# Patient Record
Sex: FEMALE | Race: WHITE | NOT HISPANIC OR LATINO | Employment: PART TIME | URBAN - METROPOLITAN AREA
[De-identification: names, ages, dates, MRNs, and addresses within clinical notes are randomized per-mention and may not be internally consistent; named-entity substitution may affect disease eponyms.]

---

## 2019-02-13 LAB
EXTERNAL CHLAMYDIA RESULT: NOT DETECTED
N GONORRHOEA RRNA SPEC QL PROBE: NOT DETECTED

## 2020-02-25 ENCOUNTER — OFFICE VISIT (OUTPATIENT)
Dept: FAMILY MEDICINE CLINIC | Facility: HOSPITAL | Age: 35
End: 2020-02-25
Payer: COMMERCIAL

## 2020-02-25 VITALS
OXYGEN SATURATION: 98 % | WEIGHT: 293 LBS | DIASTOLIC BLOOD PRESSURE: 100 MMHG | BODY MASS INDEX: 48.82 KG/M2 | HEIGHT: 65 IN | HEART RATE: 85 BPM | SYSTOLIC BLOOD PRESSURE: 160 MMHG | TEMPERATURE: 98.6 F

## 2020-02-25 DIAGNOSIS — E03.9 HYPOTHYROIDISM, UNSPECIFIED TYPE: Primary | ICD-10-CM

## 2020-02-25 DIAGNOSIS — Z13.220 SCREENING CHOLESTEROL LEVEL: ICD-10-CM

## 2020-02-25 DIAGNOSIS — E66.01 MORBID OBESITY WITH BMI OF 50.0-59.9, ADULT (HCC): ICD-10-CM

## 2020-02-25 DIAGNOSIS — R03.0 ELEVATED BLOOD PRESSURE READING: ICD-10-CM

## 2020-02-25 DIAGNOSIS — F41.1 GENERALIZED ANXIETY DISORDER: ICD-10-CM

## 2020-02-25 PROBLEM — O24.414 INSULIN CONTROLLED GESTATIONAL DIABETES MELLITUS (GDM) DURING PREGNANCY, ANTEPARTUM: Status: ACTIVE | Noted: 2019-08-13

## 2020-02-25 PROCEDURE — 1036F TOBACCO NON-USER: CPT | Performed by: NURSE PRACTITIONER

## 2020-02-25 PROCEDURE — 99204 OFFICE O/P NEW MOD 45 MIN: CPT | Performed by: NURSE PRACTITIONER

## 2020-02-25 RX ORDER — FLUTICASONE PROPIONATE 50 MCG
1 SPRAY, SUSPENSION (ML) NASAL DAILY
COMMUNITY

## 2020-02-25 RX ORDER — VENLAFAXINE HYDROCHLORIDE 37.5 MG/1
37.5 CAPSULE, EXTENDED RELEASE ORAL DAILY
Qty: 7 CAPSULE | Refills: 0 | Status: SHIPPED | OUTPATIENT
Start: 2020-02-25 | End: 2020-04-06 | Stop reason: ALTCHOICE

## 2020-02-25 RX ORDER — LEVOTHYROXINE SODIUM 0.05 MG/1
50 TABLET ORAL DAILY
COMMUNITY
End: 2020-03-10 | Stop reason: SDUPTHER

## 2020-02-25 RX ORDER — VENLAFAXINE HYDROCHLORIDE 75 MG/1
CAPSULE, EXTENDED RELEASE ORAL
Qty: 30 CAPSULE | Refills: 0 | Status: SHIPPED | OUTPATIENT
Start: 2020-02-25 | End: 2020-04-03

## 2020-02-25 NOTE — PATIENT INSTRUCTIONS
Obesity   AMBULATORY CARE:   Obesity  is when your body mass index (BMI) is greater than 30  Your healthcare provider will use your height and weight to measure your BMI  The risks of obesity include  many health problems, such as injuries or physical disability  You may need tests to check for the following:  · Diabetes     · High blood pressure or high cholesterol     · Heart disease     · Gallbladder or liver disease     · Cancer of the colon, breast, prostate, liver, or kidney     · Sleep apnea     · Arthritis or gout  Seek care immediately if:   · You have a severe headache, confusion, or difficulty speaking  · You have weakness on one side of your body  · You have chest pain, sweating, or shortness of breath  Contact your healthcare provider if:   · You have symptoms of gallbladder or liver disease, such as pain in your upper abdomen  · You have knee or hip pain and discomfort while walking  · You have symptoms of diabetes, such as intense hunger and thirst, and frequent urination  · You have symptoms of sleep apnea, such as snoring or daytime sleepiness  · You have questions or concerns about your condition or care  Treatment for obesity  focuses on helping you lose weight to improve your health  Even a small decrease in BMI can reduce the risk for many health problems  Your healthcare provider will help you set a weight-loss goal   · Lifestyle changes  are the first step in treating obesity  These include making healthy food choices and getting regular physical activity  Your healthcare provider may suggest a weight-loss program that involves coaching, education, and therapy  · Medicine  may help you lose weight when it is used with a healthy diet and physical activity  · Surgery  can help you lose weight if you are very obese and have other health problems  There are several types of weight-loss surgery  Ask your healthcare provider for more information    Be successful losing weight:   · Set small, realistic goals  An example of a small goal is to walk for 20 minutes 5 days a week  Anther goal is to lose 5% of your body weight  · Tell friends, family members, and coworkers about your goals  and ask for their support  Ask a friend to lose weight with you, or join a weight-loss support group  · Identify foods or triggers that may cause you to overeat , and find ways to avoid them  Remove tempting high-calorie foods from your home and workplace  Place a bowl of fresh fruit on your kitchen counter  If stress causes you to eat, then find other ways to cope with stress  · Keep a diary to track what you eat and drink  Also write down how many minutes of physical activity you do each day  Weigh yourself once a week and record it in your diary  Eating changes: You will need to eat 500 to 1,000 fewer calories each day than you currently eat to lose 1 to 2 pounds a week  The following changes will help you cut calories:  · Eat smaller portions  Use small plates, no larger than 9 inches in diameter  Fill your plate half full of fruits and vegetables  Measure your food using measuring cups until you know what a serving size looks like  · Eat 3 meals and 1 or 2 snacks each day  Plan your meals in advance  Alexsandra Xie and eat at home most of the time  Eat slowly  · Eat fruits and vegetables at every meal   They are low in calories and high in fiber, which makes you feel full  Do not add butter, margarine, or cream sauce to vegetables  Use herbs to season steamed vegetables  · Eat less fat and fewer fried foods  Eat more baked or grilled chicken and fish  These protein sources are lower in calories and fat than red meat  Limit fast food  Dress your salads with olive oil and vinegar instead of bottled dressing  · Limit the amount of sugar you eat  Do not drink sugary beverages  Limit alcohol  Activity changes:  Physical activity is good for your body in many ways   It helps you burn calories and build strong muscles  It decreases stress and depression, and improves your mood  It can also help you sleep better  Talk to your healthcare provider before you begin an exercise program   · Exercise for at least 30 minutes 5 days a week  Start slowly  Set aside time each day for physical activity that you enjoy and that is convenient for you  It is best to do both weight training and an activity that increases your heart rate, such as walking, bicycling, or swimming  · Find ways to be more active  Do yard work and housecleaning  Walk up the stairs instead of using elevators  Spend your leisure time going to events that require walking, such as outdoor festivals or fairs  This extra physical activity can help you lose weight and keep it off  Follow up with your healthcare provider as directed: You may need to meet with a dietitian  Write down your questions so you remember to ask them during your visits  © 2017 [address] Information is for End User's use only and may not be sold, redistributed or otherwise used for commercial purposes  All illustrations and images included in CareNotes® are the copyrighted property of [employer]  or Juan Siegel  The above information is an  only  It is not intended as medical advice for individual conditions or treatments  Talk to your doctor, nurse or pharmacist before following any medical regimen to see if it is safe and effective for you  Anxiety, Ambulatory Care   GENERAL INFORMATION:   Anxiety  is a condition that causes you to feel excessive worry, uneasiness, or fear  Family or work stress, smoking, caffeine, and alcohol can increase your risk for anxiety  Certain medicines or health conditions can also increase your risk  Anxiety may begin gradually and can become a long-term condition if it is not managed or treated    Common symptoms include the following:   · Fatigue or muscle tightness     · Shaking, restlessness, or irritability     · Problems focusing     · Trouble sleeping     · Feeling jumpy, easily startled, or dizzy     · Rapid heartbeat or shortness of breath  Seek immediate care for the following symptoms:   · Chest pain, tightness, or heaviness that may spread to your shoulders, arms, jaw, neck, or back    · Feeling like hurting yourself or someone else    · Dizziness or feeling lightheaded or faint  Treatment for anxiety  may include medicines to help you feel calm and relaxed, and decrease your symptoms  Healthcare providers will treat any medical conditions that may be causing your symptoms  Manage anxiety:   · Go to counseling as directed  Cognitive behavioral therapy can help you understand and change how you react to events that trigger your symptoms  · Find ways to manage your symptoms  Activities such as exercise, meditation, or listening to music can help you relax  · Practice deep breathing  Breathing can change how your body reacts to stress  Focus on taking slow, deep breaths several times a day, or during an anxiety attack  Breathe in through your nose, and out through your mouth  · Avoid caffeine  Caffeine can make your symptoms worse  Avoid foods or drinks that are meant to increase your energy level  · Limit or avoid alcohol  Ask your healthcare provider if alcohol is safe for you  You may not be able to drink alcohol if you take certain anxiety or depression medicines  Limit alcohol to 1 drink per day if you are a woman  Limit alcohol to 2 drinks per day if you are a man  A drink of alcohol is 12 ounces of beer, 5 ounces of wine, or 1½ ounces of liquor  Follow up with your healthcare provider as directed:  Write down your questions so you remember to ask them during your visits  CARE AGREEMENT:   You have the right to help plan your care  Learn about your health condition and how it may be treated   Discuss treatment options with your caregivers to decide what care you want to receive  You always have the right to refuse treatment  The above information is an  only  It is not intended as medical advice for individual conditions or treatments  Talk to your doctor, nurse or pharmacist before following any medical regimen to see if it is safe and effective for you  © 2014 [address] is for End User's use only and may not be sold, redistributed or otherwise used for commercial purposes  All illustrations and images included in CareNotes® are the copyrighted property of [employer]  or [facility]  Hypertension   AMBULATORY CARE:   Hypertension  is high blood pressure (BP)  Your BP is the force of your blood moving against the walls of your arteries  Normal BP is less than 120/80  Prehypertension is between 120/80 and 139/89  Hypertension is 140/90 or higher  Hypertension causes your BP to get so high that your heart has to work much harder than normal  This can damage your heart  You can control hypertension with a healthy lifestyle or medicines  A controlled blood pressure helps protect your organs, such as your heart, lungs, brain, and kidneys  Common symptoms include the following:   · Headache     · Blurred vision     · Chest pain     · Dizziness or weakness     · Trouble breathing    · Nosebleeds  Call 911 for any of the following:   · You have discomfort in your chest that feels like squeezing, pressure, fullness, or pain  · You become confused or have difficulty speaking  · You suddenly feel lightheaded or have trouble breathing  · You have pain or discomfort in your back, neck, jaw, stomach, or arm  Seek care immediately if:   · You have a severe headache or vision loss  · You have weakness in an arm or leg  Contact your healthcare provider if:   · You feel faint, dizzy, confused, or drowsy      · You have been taking your BP medicine and your BP is still higher than your healthcare provider says it should be  · You have questions or concerns about your condition or care  Treatment for hypertension  may include medicine to lower your BP and lower your cholesterol level  A low cholesterol level helps prevent heart disease and makes it easier to control your blood pressure  You may also need to make lifestyle changes  Take your medicine exactly as directed  Manage hypertension:  Talk with your healthcare provider about these and other ways to manage hypertension:  · Check your BP at home  Sit and rest for 5 minutes before you take your BP  Extend your arm and support it on a flat surface  Your arm should be at the same level as your heart  Follow the directions that came with your BP monitor  If possible, take at least 2 BP readings each time  Take your BP at least twice a day at the same times each day, such as morning and evening  Keep a record of your BP readings and bring it to your follow-up visits  Ask your healthcare provider what your BP should be  · Limit sodium (salt) as directed  Too much sodium can affect your fluid balance  Check labels to find low-sodium or no-salt-added foods  Some low-sodium foods use potassium salts for flavor  Too much potassium can also cause health problems  Your healthcare provider will tell you how much sodium and potassium are safe for you to have in a day  He or she may recommend that you limit sodium to 2,300 mg a day  · Follow the meal plan recommended by your healthcare provider  A dietitian or your provider can give you more information on low-sodium plans or the DASH (Dietary Approaches to Stop Hypertension) eating plan  The DASH plan is low in sodium, unhealthy fats, and total fat  It is high in potassium, calcium, and fiber  · Exercise to maintain a healthy weight  Exercise at least 30 minutes per day, on most days of the week  This will help decrease your blood pressure   Ask your healthcare provider about the best exercise plan for you  · Decrease stress  This may help lower your BP  Learn ways to relax, such as deep breathing or listening to music  · Limit alcohol  Women should limit alcohol to 1 drink a day  Men should limit alcohol to 2 drinks a day  A drink of alcohol is 12 ounces of beer, 5 ounces of wine, or 1½ ounces of liquor  · Do not smoke  Nicotine and other chemicals in cigarettes and cigars can increase your BP and also cause lung damage  Ask your healthcare provider for information if you currently smoke and need help to quit  E-cigarettes or smokeless tobacco still contain nicotine  Talk to your healthcare provider before you use these products  · Manage any other health conditions you have  Health conditions such as diabetes can increase your risk for hypertension  Follow your healthcare provider's instructions and take all your medicines as directed  Follow up with your healthcare provider as directed: You will need to return to have your BP checked and to have other lab tests done  Write down your questions so you remember to ask them during your visits  © 2017 [address] [facility] Information is for End User's use only and may not be sold, redistributed or otherwise used for commercial purposes  All illustrations and images included in CareNotes® are the copyrighted property of [employer] M [employer] Inc  or [employer]  The above information is an  only  It is not intended as medical advice for individual conditions or treatments  Talk to your doctor, nurse or pharmacist before following any medical regimen to see if it is safe and effective for you  DASH Eating Plan   WHAT YOU NEED TO KNOW:   The DASH (Dietary Approaches to Stop Hypertension) Eating Plan is designed to help prevent or lower high blood pressure  It can also help to lower LDL (bad) cholesterol and decrease your risk of heart disease   The plan is low in sodium, sugar, unhealthy fats, and total fat  It is high in potassium, calcium, magnesium, and fiber  These nutrients are added when you eat more fruits, vegetables, and whole grains  DISCHARGE INSTRUCTIONS:   Your sodium limit each day: Your dietitian will tell you how much sodium is safe for you to have each day  People with high blood pressure should have no more than 1,500 to 2,300 mg of sodium in a day  A teaspoon (tsp) of salt has 2,300 mg of sodium  This may seem like a difficult goal, but small changes to the foods you eat can make a big difference  Your healthcare provider or dietitian can help you create a meal plan that follows your sodium limit  How to limit sodium:   · Read food labels  Food labels can help you choose foods that are low in sodium  The amount of sodium is listed in milligrams (mg)  The % Daily Value (DV) column tells you how much of your daily needs are met by 1 serving of the food for each nutrient listed  Choose foods that have less than 5% of the DV of sodium  These foods are considered low in sodium  Foods that have 20% or more of the DV of sodium are considered high in sodium  Avoid foods that have more than 300 mg of sodium in each serving  Choose foods that say low-sodium, reduced-sodium, or no salt added on the food label  · Avoid salt  Do not salt food at the table, and add very little salt to foods during cooking  Use herbs and spices, such as onions, garlic, and salt-free seasonings to add flavor to foods  Try lemon or lime juice or vinegar to give foods a tart flavor  Use hot peppers or a small amount of hot pepper sauce to add a spicy flavor to foods  · Ask about salt substitutes  Ask your healthcare provider if you may use salt substitutes  Some salt substitutes have ingredients that can be harmful if you have certain health conditions  · Choose foods carefully at restaurants  Meals from restaurants, especially fast food restaurants, are often high in sodium   Some restaurants have nutrition information that tells you the amount of sodium in their foods  Ask to have your food prepared with less, or no salt  What you need to know about fats:   · Include healthy fats  Examples are unsaturated fats and omega-3 fatty acids  Unsaturated fats are found in soybean, canola, olive, or sunflower oil, and liquid and soft tub margarines  Omega-3 fatty acids are found in fatty fish, such as salmon, tuna, mackerel, and sardines  It is also found in flaxseed oil and ground flaxseed  · Avoid unhealthy fats  Do not eat unhealthy fats, such as saturated fats and trans fats  Saturated fats are found in foods that contain fat from animals  Examples are fatty meats, whole milk, butter, cream, and other dairy foods  It is also found in shortening, stick margarine, palm oil, and coconut oil  Trans fats are found in fried foods, crackers, chips, and baked goods made with margarine or shortening  Foods to include: With the DASH eating plan, you need to eat a certain number of servings from each food group  This will help you get enough of certain nutrients and limit others  The amount of servings you should eat depends on how many calories you need  Your dietitian can tell you how many calories you need  The number of servings listed next to the food groups below are for people who need about 2,000 calories each day    · Grains:  6 to 8 servings (3 of these servings should be whole-grain foods)    ¨ 1 slice of whole-grain bread     ¨ 1 ounce of dry cereal    ¨ ½ cup of cooked cereal, pasta, or brown rice    · Vegetables and fruits:  4 to 5 servings of fruits and 4 to 5 servings of vegetables    ¨ 1 medium fruit    ¨ ½ cup of frozen, canned (no added salt), or chopped fresh vegetables     ¨ ½ cup of fresh, frozen, dried, or canned fruit (canned in light syrup or fruit juice)    ¨ ½ cup of vegetable or fruit juice    · Dairy:  2 to 3 servings    ¨ 1 cup of nonfat (skim) or 1% milk    ¨ 1½ ounces of fat-free or low-fat cheese    ¨ 6 ounces of nonfat or low-fat yogurt    · Lean meat, poultry, and fish:  6 ounces or less    [employer] (chicken, turkey) with no skin    ¨ Fish (especially fatty fish, such as salmon, fresh tuna, or mackerel)    ¨ Lean beef and pork (loin, round, extra lean hamburger)    ¨ Egg whites and egg substitutes    · Nuts, seeds, and legumes:  4 to 5 servings each week    ¨ ½ cup of cooked beans and peas    ¨ 1½ ounces of unsalted nuts    ¨ 2 tablespoons of peanut butter or seeds    · Sweets and added sugars:  5 or less each week    ¨ 1 tablespoon of sugar, jelly, or jam    ¨ ½ cup of sorbet or gelatin    ¨ 1 cup of lemonade    · Fats:  2 to 3 servings each week    ¨ 1 teaspoon of soft margarine or vegetable oil    ¨ 1 tablespoon of mayonnaise    ¨ 2 tablespoons of salad dressing  Foods to avoid:   · Grains:      [employer], such as doughnuts, pastries, cookies, and biscuits (high in fat and sugar)    ¨ Mixes for cornbread and biscuits, packaged foods, such as bread stuffing, rice and pasta mixes, macaroni and cheese, and instant cereals (high in sodium)    · Fruits and vegetables:      ¨ Regular, canned vegetables (high in sodium)    ¨ Sauerkraut, pickled vegetables, and other foods prepared in brine (high in sodium)    ¨ Fried vegetables or vegetables in butter or high-fat sauces    ¨ Fruit in cream or butter sauce (high in fat)    · Dairy:      ¨ Whole milk, 2% milk, and cream (high in fat)    ¨ Regular cheese and processed cheese (high in fat and sodium)    · Meats and protein foods:      ¨ Smoked or cured meat, such as corned beef, m[place], ham, hot dogs, and sausage (high in fat and sodium)    ¨ Canned beans and canned meats or spreads, such as potted meats, sardines, anchovies, and imitation seafood (high in sodium)    ¨ Deli or lunch meats, such as bologna, ham, turkey, and roast beef (high in sodium)    ¨ High-fat meat (T-bone steak, regular hamburger, and ribs)    ¨ Whole eggs and egg yolks (high in fat)    · Other:      ¨ Seasonings made with salt, such as garlic salt, celery salt, onion salt, seasoned salt, meat tenderizers, and monosodium glutamate (MSG)    ¨ Miso soup and canned or dried soup mixes (high in sodium)    ¨ Regular soy sauce, barbecue sauce, teriyaki sauce, steak sauce, Worcestershire sauce, and most flavored vinegars (high in sodium)    ¨ Regular condiments, such as mustard, ketchup, and salad dressings (high in sodium)    ¨ Gravy and sauces, such as Jarred or cheese sauces (high in sodium and fat)    ¨ Drinks high in sugar, such as soda or fruit drinks    [employer] foods, such as salted chips, popcorn, pretzels, pork rinds, salted crackers, and salted nuts    ¨ Frozen foods, such as dinners, entrees, vegetables with sauces, and breaded meats (high in sodium)  Other guidelines to follow:   · Maintain a healthy weight  Your risk for heart disease is higher if you are overweight  Your healthcare provider may suggest that you lose weight if you are overweight  You can lose weight by eating fewer calories and foods that have added sugars and fat  The DASH meal plan can help you do this  Decrease calories by eating smaller portions at each meal and fewer snacks  Ask your healthcare provider for more information about how to lose weight  · Exercise regularly  Regular exercise can help you reach or maintain a healthy weight  Regular exercise can also help decrease your blood pressure and improve your cholesterol levels  Get 30 minutes or more of moderate exercise each day of the week  To lose weight, get at least 60 minutes of exercise  Talk to your healthcare provider about the best exercise program for you  · Limit alcohol  Women should limit alcohol to 1 drink a day  Men should limit alcohol to 2 drinks a day  A drink of alcohol is 12 ounces of beer, 5 ounces of wine, or 1½ ounces of liquor    © 2017 [employer] Information is for End User's use only and may not be sold, redistributed or otherwise used for commercial purposes  All illustrations and images included in CareNotes® are the copyrighted property of [employer]  or Juan Siegel  The above information is an  only  It is not intended as medical advice for individual conditions or treatments  Talk to your doctor, nurse or pharmacist before following any medical regimen to see if it is safe and effective for you  Anxiety, Ambulatory Care   GENERAL INFORMATION:   Anxiety  is a condition that causes you to feel excessive worry, uneasiness, or fear  Family or work stress, smoking, caffeine, and alcohol can increase your risk for anxiety  Certain medicines or health conditions can also increase your risk  Anxiety may begin gradually and can become a long-term condition if it is not managed or treated  Common symptoms include the following:   · Fatigue or muscle tightness     · Shaking, restlessness, or irritability     · Problems focusing     · Trouble sleeping     · Feeling jumpy, easily startled, or dizzy     · Rapid heartbeat or shortness of breath  Seek immediate care for the following symptoms:   · Chest pain, tightness, or heaviness that may spread to your shoulders, arms, jaw, neck, or back    · Feeling like hurting yourself or someone else    · Dizziness or feeling lightheaded or faint  Treatment for anxiety  may include medicines to help you feel calm and relaxed, and decrease your symptoms  Healthcare providers will treat any medical conditions that may be causing your symptoms  Manage anxiety:   · Go to counseling as directed  Cognitive behavioral therapy can help you understand and change how you react to events that trigger your symptoms  · Find ways to manage your symptoms  Activities such as exercise, meditation, or listening to music can help you relax  · Practice deep breathing  Breathing can change how your body reacts to stress   Focus on taking slow, deep breaths several times a day, or during an anxiety attack  Breathe in through your nose, and out through your mouth  · Avoid caffeine  Caffeine can make your symptoms worse  Avoid foods or drinks that are meant to increase your energy level  · Limit or avoid alcohol  Ask your healthcare provider if alcohol is safe for you  You may not be able to drink alcohol if you take certain anxiety or depression medicines  Limit alcohol to 1 drink per day if you are a woman  Limit alcohol to 2 drinks per day if you are a man  A drink of alcohol is 12 ounces of beer, 5 ounces of wine, or 1½ ounces of liquor  Follow up with your healthcare provider as directed:  Write down your questions so you remember to ask them during your visits  CARE AGREEMENT:   You have the right to help plan your care  Learn about your health condition and how it may be treated  Discuss treatment options with your caregivers to decide what care you want to receive  You always have the right to refuse treatment  The above information is an  only  It is not intended as medical advice for individual conditions or treatments  Talk to your doctor, nurse or pharmacist before following any medical regimen to see if it is safe and effective for you  © 2014 [address] is for End User's use only and may not be sold, redistributed or otherwise used for commercial purposes  All illustrations and images included in CareNotes® are the copyrighted property of A D A M , Inc  or Juan Siegel

## 2020-02-25 NOTE — PROGRESS NOTES
Assessment/Plan:    No problem-specific Assessment & Plan notes found for this encounter  Diagnoses and all orders for this visit:    Hypothyroidism, unspecified type  Comments:  Fam hx hypothyrodism  Currently talking Levothyroxine 50mcg  Labs ordered to check TFTs and antibodies  Will call with results  Orders:  -     TSH, 3rd generation; Future  -     T4, free; Future  -     T3, free; Future  -     Thyroid Antibodies Panel; Future    Morbid obesity with BMI of 50 0-59 9, adult ([facility] [address] )  Comments:  Continue lifestyle modification  Currently goes to the gym twice a week  Follow DASH diet including low carb/low fat  Orders:  -     CBC and differential; Future  -     Comprehensive metabolic panel; Future    Generalized anxiety disorder  Comments:  JEET score of 15  Venlafaxine ordered  Take medication as prescribed  Call w/ any acute concerns  Orders:  -     CBC and differential; Future  -     Comprehensive metabolic panel; Future  -     venlafaxine (EFFEXOR-XR) 37 5 mg 24 hr capsule; Take 1 capsule (37 5 mg total) by mouth daily  -     venlafaxine (EFFEXOR-XR) 75 mg 24 hr capsule; Take 1 capsule daily (start after week of 37 5mg)    Elevated blood pressure reading  Comments:  BP elevated during this encounter  Will re-check at next appointment  Will consider medication regimen if remains elevated  Screening cholesterol level  Comments:  Lipid panel ordered  Lipids have not been assessed since pregnancy  Perform lab before next visit  Orders:  -     Lipid panel; Future    Other orders  -     levothyroxine 50 mcg tablet; Take 50 mcg by mouth daily  -     Prenatal Vit-Fe Fumarate-FA (PRENATAL PO); Take 1 tablet by mouth daily  -     fluticasone (FLONASE) 50 mcg/act nasal spray; 1 spray into each nostril daily          Subjective:      Patient ID: Joy Kim is a 30 y o  female  Patient is a 31 yo female who presents to to the office today to establish care   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 daughter who just turned 5 mo  Patient reports she used to see a PCP over an hour away and she just moved to the area  Has been on Levothyroxine for the past 10 years  Never on antidiabetic medication  Was taking Insulin during pregnancy and no longer taking  Patient gets really anxious and was previously taking Lexapro (unknown dosage) years ago and stopped  She felt she was unable to express her emotions and felt "desentitized " Patient used to take Buspirone a couple years back and also stopped taking  She felt she no longer needed  Her son has life-threatening food allergies and she is feeling overwhelmed  Denies SI  Patient does not wish to see a counselor at this time due lack of time  Her TSH was last checked about 4-5 months ago and level was normal limit per patient  Patient will request her medical records  Not currently using any contraceptives  Would like to discuss contraceptives "down the road " Not currently breasfeeding  Denies migraine history with aura  The following portions of the patient's history were reviewed and updated as appropriate: allergies, current medications and problem list       Review of Systems   Constitutional: Negative  HENT: Negative  Eyes: Negative  Respiratory: Negative  Cardiovascular: Negative  Gastrointestinal: Negative  Endocrine: Negative  Genitourinary: Negative  Musculoskeletal: Positive for back pain (R hip pain from holding the children)  Skin: Negative  Neurological: Negative  Psychiatric/Behavioral: Negative for sleep disturbance and suicidal ideas  The patient is nervous/anxious (Tearful)  Objective:      /100 (BP Location: Right arm, Patient Position: Sitting, Cuff Size: Extra-Large)   Pulse 85   Temp 98 6 °F (37 °C) (Tympanic)   Ht 5' 4 5" (1 638 m)   Wt (!) 141 kg (311 lb 6 4 oz)   SpO2 98%   BMI 52 63 kg/m²          Physical Exam   Constitutional: She is oriented to person, place, and time   She appears well-developed and well-nourished  No distress  HENT:   Head: Normocephalic and atraumatic  Eyes: Pupils are equal, round, and reactive to light  No scleral icterus  Neck: Normal range of motion  Neck supple  No thyromegaly present  Cardiovascular: Normal rate, regular rhythm and normal heart sounds  No murmur heard  Pulmonary/Chest: Effort normal and breath sounds normal  No respiratory distress  She has no wheezes  She has no rales  Abdominal: Soft  Bowel sounds are normal  She exhibits no distension  There is no tenderness  Neurological: She is alert and oriented to person, place, and time  Skin: Skin is warm and dry  Capillary refill takes less than 2 seconds  Psychiatric: Her speech is normal and behavior is normal  Judgment and thought content normal  Her mood appears anxious (Tearful/crying)  Thought content is not paranoid  Cognition and memory are normal  She expresses no suicidal ideation  She expresses no suicidal plans  Vitals reviewed  BMI Counseling: Body mass index is 52 63 kg/m²  The BMI is above normal  Nutrition recommendations include 3-5 servings of fruits/vegetables daily, consuming healthier snacks, moderation in carbohydrate intake and reducing intake of cholesterol  Exercise recommendations include exercising 3-5 times per week

## 2020-03-10 DIAGNOSIS — E03.9 HYPOTHYROIDISM, UNSPECIFIED TYPE: Primary | ICD-10-CM

## 2020-03-10 LAB
BASOPHILS # BLD AUTO: 78 CELLS/UL (ref 0–200)
BASOPHILS NFR BLD AUTO: 0.9 %
EOSINOPHIL # BLD AUTO: 209 CELLS/UL (ref 15–500)
EOSINOPHIL NFR BLD AUTO: 2.4 %
ERYTHROCYTE [DISTWIDTH] IN BLOOD BY AUTOMATED COUNT: 13.5 % (ref 11–15)
HCT VFR BLD AUTO: 40.7 % (ref 35–45)
HGB BLD-MCNC: 13.7 G/DL (ref 11.7–15.5)
LYMPHOCYTES # BLD AUTO: 3158 CELLS/UL (ref 850–3900)
LYMPHOCYTES NFR BLD AUTO: 36.3 %
MCH RBC QN AUTO: 29.3 PG (ref 27–33)
MCHC RBC AUTO-ENTMCNC: 33.7 G/DL (ref 32–36)
MCV RBC AUTO: 87.2 FL (ref 80–100)
MONOCYTES # BLD AUTO: 548 CELLS/UL (ref 200–950)
MONOCYTES NFR BLD AUTO: 6.3 %
NEUTROPHILS # BLD AUTO: 4707 CELLS/UL (ref 1500–7800)
NEUTROPHILS NFR BLD AUTO: 54.1 %
PLATELET # BLD AUTO: 260 THOUSAND/UL (ref 140–400)
PMV BLD REES-ECKER: 10.2 FL (ref 7.5–12.5)
RBC # BLD AUTO: 4.67 MILLION/UL (ref 3.8–5.1)
T3FREE SERPL-MCNC: 2.9 PG/ML (ref 2.3–4.2)
T4 FREE SERPL-MCNC: 1.3 NG/DL (ref 0.8–1.8)
TSH SERPL-ACNC: 2.16 MIU/L
WBC # BLD AUTO: 8.7 THOUSAND/UL (ref 3.8–10.8)

## 2020-03-10 RX ORDER — LEVOTHYROXINE SODIUM 0.05 MG/1
50 TABLET ORAL DAILY
Qty: 30 TABLET | Refills: 0 | Status: SHIPPED | OUTPATIENT
Start: 2020-03-10 | End: 2020-04-06

## 2020-04-01 DIAGNOSIS — F41.1 GENERALIZED ANXIETY DISORDER: ICD-10-CM

## 2020-04-03 RX ORDER — VENLAFAXINE HYDROCHLORIDE 75 MG/1
CAPSULE, EXTENDED RELEASE ORAL
Qty: 30 CAPSULE | Refills: 0 | Status: SHIPPED | OUTPATIENT
Start: 2020-04-03 | End: 2020-04-28

## 2020-04-04 DIAGNOSIS — E03.9 HYPOTHYROIDISM, UNSPECIFIED TYPE: ICD-10-CM

## 2020-04-06 ENCOUNTER — TELEMEDICINE (OUTPATIENT)
Dept: FAMILY MEDICINE CLINIC | Facility: HOSPITAL | Age: 35
End: 2020-04-06
Payer: COMMERCIAL

## 2020-04-06 VITALS — BODY MASS INDEX: 48.82 KG/M2 | WEIGHT: 293 LBS | HEIGHT: 65 IN | TEMPERATURE: 97.7 F

## 2020-04-06 DIAGNOSIS — F41.1 GENERALIZED ANXIETY DISORDER: Primary | ICD-10-CM

## 2020-04-06 DIAGNOSIS — Z86.32 HISTORY OF GESTATIONAL DIABETES: ICD-10-CM

## 2020-04-06 DIAGNOSIS — E03.9 HYPOTHYROIDISM, UNSPECIFIED TYPE: ICD-10-CM

## 2020-04-06 PROBLEM — O24.414 INSULIN CONTROLLED GESTATIONAL DIABETES MELLITUS (GDM) DURING PREGNANCY, ANTEPARTUM: Status: RESOLVED | Noted: 2019-08-13 | Resolved: 2020-04-06

## 2020-04-06 PROCEDURE — 99213 OFFICE O/P EST LOW 20 MIN: CPT | Performed by: NURSE PRACTITIONER

## 2020-04-06 RX ORDER — LEVOTHYROXINE SODIUM 0.05 MG/1
TABLET ORAL
Qty: 30 TABLET | Refills: 2 | Status: SHIPPED | OUTPATIENT
Start: 2020-04-06 | End: 2020-08-11 | Stop reason: SDUPTHER

## 2020-04-28 DIAGNOSIS — F41.1 GENERALIZED ANXIETY DISORDER: ICD-10-CM

## 2020-04-28 RX ORDER — VENLAFAXINE HYDROCHLORIDE 75 MG/1
CAPSULE, EXTENDED RELEASE ORAL
Qty: 30 CAPSULE | Refills: 0 | Status: SHIPPED | OUTPATIENT
Start: 2020-04-28 | End: 2020-05-29

## 2020-05-23 ENCOUNTER — NURSE TRIAGE (OUTPATIENT)
Dept: OTHER | Facility: OTHER | Age: 35
End: 2020-05-23

## 2020-05-29 DIAGNOSIS — F41.1 GENERALIZED ANXIETY DISORDER: ICD-10-CM

## 2020-05-29 RX ORDER — VENLAFAXINE HYDROCHLORIDE 75 MG/1
CAPSULE, EXTENDED RELEASE ORAL
Qty: 30 CAPSULE | Refills: 0 | Status: SHIPPED | OUTPATIENT
Start: 2020-05-29 | End: 2020-08-07

## 2020-07-25 ENCOUNTER — HOSPITAL ENCOUNTER (EMERGENCY)
Facility: HOSPITAL | Age: 35
Discharge: HOME | End: 2020-07-26
Attending: EMERGENCY MEDICINE | Admitting: EMERGENCY MEDICINE
Payer: COMMERCIAL

## 2020-07-25 ENCOUNTER — APPOINTMENT (EMERGENCY)
Dept: CT IMAGING | Facility: HOSPITAL | Age: 35
End: 2020-07-25
Payer: COMMERCIAL

## 2020-07-25 VITALS
OXYGEN SATURATION: 96 % | HEART RATE: 109 BPM | DIASTOLIC BLOOD PRESSURE: 94 MMHG | RESPIRATION RATE: 18 BRPM | SYSTOLIC BLOOD PRESSURE: 180 MMHG | TEMPERATURE: 98.5 F | WEIGHT: 293 LBS | BODY MASS INDEX: 52.73 KG/M2

## 2020-07-25 DIAGNOSIS — T78.2XXA ANAPHYLAXIS, INITIAL ENCOUNTER: ICD-10-CM

## 2020-07-25 DIAGNOSIS — K76.0 FATTY LIVER: Primary | ICD-10-CM

## 2020-07-25 DIAGNOSIS — R10.9 FLANK PAIN: ICD-10-CM

## 2020-07-25 LAB
ALBUMIN SERPL BCP-MCNC: 4 G/DL (ref 3.5–5)
ALP SERPL-CCNC: 69 U/L (ref 46–116)
ALT SERPL W P-5'-P-CCNC: 128 U/L (ref 12–78)
ANION GAP SERPL CALCULATED.3IONS-SCNC: 12 MMOL/L (ref 4–13)
AST SERPL W P-5'-P-CCNC: 74 U/L (ref 5–45)
BASOPHILS # BLD AUTO: 0.07 THOUSANDS/ΜL (ref 0–0.1)
BASOPHILS NFR BLD AUTO: 1 % (ref 0–1)
BILIRUB SERPL-MCNC: 0.6 MG/DL (ref 0.2–1)
BUN SERPL-MCNC: 11 MG/DL (ref 5–25)
CALCIUM SERPL-MCNC: 9.6 MG/DL (ref 8.3–10.1)
CHLORIDE SERPL-SCNC: 100 MMOL/L (ref 100–108)
CLARITY, POC: CLEAR
CO2 SERPL-SCNC: 27 MMOL/L (ref 21–32)
COLOR, POC: YELLOW
CREAT SERPL-MCNC: 0.91 MG/DL (ref 0.6–1.3)
D DIMER PPP FEU-MCNC: <0.27 UG/ML FEU
EOSINOPHIL # BLD AUTO: 0 THOUSAND/ΜL (ref 0–0.61)
EOSINOPHIL NFR BLD AUTO: 0 % (ref 0–6)
ERYTHROCYTE [DISTWIDTH] IN BLOOD BY AUTOMATED COUNT: 14.9 % (ref 11.6–15.1)
EXT BILIRUBIN, UA: NEGATIVE
EXT BLOOD URINE: ABNORMAL
EXT GLUCOSE, UA: NEGATIVE
EXT KETONES: NEGATIVE
EXT NITRITE, UA: NEGATIVE
EXT PH, UA: 6
EXT PREG TEST URINE: NEGATIVE
EXT PROTEIN, UA: ABNORMAL
EXT SPECIFIC GRAVITY, UA: 1.01
EXT UROBILINOGEN: 0.2
EXT. CONTROL ED NAV: NORMAL
GFR SERPL CREATININE-BSD FRML MDRD: 83 ML/MIN/1.73SQ M
GLUCOSE SERPL-MCNC: 109 MG/DL (ref 65–140)
HCT VFR BLD AUTO: 45.9 % (ref 34.8–46.1)
HGB BLD-MCNC: 15.6 G/DL (ref 11.5–15.4)
IMM GRANULOCYTES # BLD AUTO: 0.03 THOUSAND/UL (ref 0–0.2)
IMM GRANULOCYTES NFR BLD AUTO: 0 % (ref 0–2)
LIPASE SERPL-CCNC: 158 U/L (ref 73–393)
LYMPHOCYTES # BLD AUTO: 2.27 THOUSANDS/ΜL (ref 0.6–4.47)
LYMPHOCYTES NFR BLD AUTO: 26 % (ref 14–44)
MCH RBC QN AUTO: 31.4 PG (ref 26.8–34.3)
MCHC RBC AUTO-ENTMCNC: 34 G/DL (ref 31.4–37.4)
MCV RBC AUTO: 92 FL (ref 82–98)
MONOCYTES # BLD AUTO: 0.83 THOUSAND/ΜL (ref 0.17–1.22)
MONOCYTES NFR BLD AUTO: 10 % (ref 4–12)
NEUTROPHILS # BLD AUTO: 5.58 THOUSANDS/ΜL (ref 1.85–7.62)
NEUTS SEG NFR BLD AUTO: 63 % (ref 43–75)
NRBC BLD AUTO-RTO: 0 /100 WBCS
PLATELET # BLD AUTO: 229 THOUSANDS/UL (ref 149–390)
PMV BLD AUTO: 10.3 FL (ref 8.9–12.7)
POTASSIUM SERPL-SCNC: 3.8 MMOL/L (ref 3.5–5.3)
PROT SERPL-MCNC: 8.2 G/DL (ref 6.4–8.2)
RBC # BLD AUTO: 4.97 MILLION/UL (ref 3.81–5.12)
SODIUM SERPL-SCNC: 139 MMOL/L (ref 136–145)
WBC # BLD AUTO: 8.78 THOUSAND/UL (ref 4.31–10.16)
WBC # BLD EST: NEGATIVE 10*3/UL

## 2020-07-25 PROCEDURE — 81025 URINE PREGNANCY TEST: CPT | Performed by: EMERGENCY MEDICINE

## 2020-07-25 PROCEDURE — 96372 THER/PROPH/DIAG INJ SC/IM: CPT

## 2020-07-25 PROCEDURE — 83690 ASSAY OF LIPASE: CPT | Performed by: EMERGENCY MEDICINE

## 2020-07-25 PROCEDURE — 99284 EMERGENCY DEPT VISIT MOD MDM: CPT

## 2020-07-25 PROCEDURE — 85379 FIBRIN DEGRADATION QUANT: CPT | Performed by: EMERGENCY MEDICINE

## 2020-07-25 PROCEDURE — 36415 COLL VENOUS BLD VENIPUNCTURE: CPT | Performed by: EMERGENCY MEDICINE

## 2020-07-25 PROCEDURE — 74177 CT ABD & PELVIS W/CONTRAST: CPT

## 2020-07-25 PROCEDURE — 85025 COMPLETE CBC W/AUTO DIFF WBC: CPT | Performed by: EMERGENCY MEDICINE

## 2020-07-25 PROCEDURE — 81002 URINALYSIS NONAUTO W/O SCOPE: CPT | Performed by: EMERGENCY MEDICINE

## 2020-07-25 PROCEDURE — 96375 TX/PRO/DX INJ NEW DRUG ADDON: CPT

## 2020-07-25 PROCEDURE — 96361 HYDRATE IV INFUSION ADD-ON: CPT

## 2020-07-25 PROCEDURE — 99284 EMERGENCY DEPT VISIT MOD MDM: CPT | Performed by: EMERGENCY MEDICINE

## 2020-07-25 PROCEDURE — 80053 COMPREHEN METABOLIC PANEL: CPT | Performed by: EMERGENCY MEDICINE

## 2020-07-25 PROCEDURE — 96374 THER/PROPH/DIAG INJ IV PUSH: CPT

## 2020-07-25 RX ORDER — DIPHENHYDRAMINE HCL 25 MG
25 TABLET ORAL EVERY 6 HOURS PRN
Qty: 20 TABLET | Refills: 0 | Status: SHIPPED | OUTPATIENT
Start: 2020-07-25 | End: 2020-08-11

## 2020-07-25 RX ORDER — CYCLOBENZAPRINE HCL 5 MG
5 TABLET ORAL 3 TIMES DAILY PRN
Qty: 15 TABLET | Refills: 0 | Status: SHIPPED | OUTPATIENT
Start: 2020-07-25 | End: 2020-08-11

## 2020-07-25 RX ORDER — METHYLPREDNISOLONE SODIUM SUCCINATE 125 MG/2ML
125 INJECTION, POWDER, LYOPHILIZED, FOR SOLUTION INTRAMUSCULAR; INTRAVENOUS ONCE
Status: COMPLETED | OUTPATIENT
Start: 2020-07-25 | End: 2020-07-25

## 2020-07-25 RX ORDER — PREDNISONE 20 MG/1
40 TABLET ORAL DAILY
Qty: 8 TABLET | Refills: 0 | Status: SHIPPED | OUTPATIENT
Start: 2020-07-25 | End: 2020-07-29

## 2020-07-25 RX ORDER — ONDANSETRON 2 MG/ML
4 INJECTION INTRAMUSCULAR; INTRAVENOUS ONCE
Status: COMPLETED | OUTPATIENT
Start: 2020-07-25 | End: 2020-07-25

## 2020-07-25 RX ORDER — EPINEPHRINE 0.3 MG/.3ML
0.3 INJECTION SUBCUTANEOUS ONCE
Qty: 0.6 ML | Refills: 0 | Status: SHIPPED | OUTPATIENT
Start: 2020-07-26 | End: 2020-08-11

## 2020-07-25 RX ORDER — MORPHINE SULFATE 4 MG/ML
4 INJECTION, SOLUTION INTRAMUSCULAR; INTRAVENOUS ONCE
Status: COMPLETED | OUTPATIENT
Start: 2020-07-25 | End: 2020-07-25

## 2020-07-25 RX ORDER — EPINEPHRINE 1 MG/ML
0.5 INJECTION, SOLUTION, CONCENTRATE INTRAVENOUS ONCE
Status: COMPLETED | OUTPATIENT
Start: 2020-07-25 | End: 2020-07-25

## 2020-07-25 RX ORDER — DIPHENHYDRAMINE HYDROCHLORIDE 50 MG/ML
50 INJECTION INTRAMUSCULAR; INTRAVENOUS ONCE
Status: COMPLETED | OUTPATIENT
Start: 2020-07-25 | End: 2020-07-25

## 2020-07-25 RX ADMIN — MORPHINE SULFATE 4 MG: 4 INJECTION INTRAVENOUS at 20:20

## 2020-07-25 RX ADMIN — EPINEPHRINE 0.5 MG: 1 INJECTION, SOLUTION, CONCENTRATE INTRAVENOUS at 21:47

## 2020-07-25 RX ADMIN — METHYLPREDNISOLONE SODIUM SUCCINATE 125 MG: 125 INJECTION, POWDER, FOR SOLUTION INTRAMUSCULAR; INTRAVENOUS at 21:49

## 2020-07-25 RX ADMIN — SODIUM CHLORIDE 1000 ML: 0.9 INJECTION, SOLUTION INTRAVENOUS at 20:20

## 2020-07-25 RX ADMIN — ONDANSETRON 4 MG: 2 INJECTION INTRAMUSCULAR; INTRAVENOUS at 20:21

## 2020-07-25 RX ADMIN — DIPHENHYDRAMINE HYDROCHLORIDE 50 MG: 50 INJECTION INTRAMUSCULAR; INTRAVENOUS at 21:47

## 2020-07-25 RX ADMIN — IOHEXOL 100 ML: 350 INJECTION, SOLUTION INTRAVENOUS at 21:42

## 2020-07-26 NOTE — ED PROVIDER NOTES
History  Chief Complaint   Patient presents with    Flank Pain     pt c/o left flank pain for approx 1 month  pt c/o vomiting       History provided by:  Patient   used: No    Flank Pain   Associated symptoms: no chest pain, no chills, no cough, no diarrhea, no dysuria, no fever, no nausea, no shortness of breath, no sore throat and no vomiting      Patient is a 72-year-old female presenting to emergency department left flank pain  Present for months  Intermittent nausea vomiting  No fevers or chills  No chest pain  No shortness of breath  Having normal bowel movements  No urine complaints  No history of kidney stones  No allergies  MDM will check abdominal labs, D-dimer, urine, CT, pain control          Prior to Admission Medications   Prescriptions Last Dose Informant Patient Reported? Taking? Prenatal Vit-Fe Fumarate-FA (PRENATAL PO)   Yes No   Sig: Take 1 tablet by mouth daily   fluticasone (FLONASE) 50 mcg/act nasal spray   Yes No   Si spray into each nostril daily   levothyroxine 50 mcg tablet   No No   Sig: TAKE 1 TABLET BY MOUTH EVERY DAY   venlafaxine (EFFEXOR-XR) 75 mg 24 hr capsule   No No   Sig: TAKE 1 CAPSULE BY MOUTH EVERY DAY      Facility-Administered Medications: None       Past Medical History:   Diagnosis Date    Anxiety     Insulin controlled gestational diabetes mellitus (GDM) during pregnancy, antepartum 2019    Metformin dinner/HS insulin  Last Assessment & Plan:  BG log reviewed today:- previous review on Friday with recommendation for increasing HS insulin  Fastings: high normal 1 hour PP: within the desired range   Discussed rationale and recommendation to change current medical therapy as listed:   Bedtime: 18 units Levemir  Continue same dose of Metformin, 1000 mg at dinner    Denies signs and sympto    Obesity        Past Surgical History:   Procedure Laterality Date     SECTION  2018     SECTION  10/20/2017    DILATION AND CURETTAGE OF UTERUS      MASTOIDECTOMY  2013    MASTOIDECTOMY Right     WISDOM TOOTH EXTRACTION         Family History   Problem Relation Age of Onset    Multiple sclerosis Mother     Thyroid disease Mother     Diabetes Mother     Hypertension Mother     COPD Mother     Rectal cancer Father     Hyperlipidemia Father     Hypertension Father     Alcohol abuse Father     Depression Sister     Hypertension Sister      I have reviewed and agree with the history as documented  E-Cigarette/Vaping    E-Cigarette Use Never User      E-Cigarette/Vaping Substances    Nicotine No     THC No     CBD No     Flavoring No     Other No     Unknown No      Social History     Tobacco Use    Smoking status: Current Every Day Smoker     Packs/day: 0 50     Types: Cigarettes     Last attempt to quit: 2017     Years since quitting: 3 5    Smokeless tobacco: Never Used   Substance Use Topics    Alcohol use: Yes     Frequency: Monthly or less     Drinks per session: 1 or 2     Binge frequency: Never    Drug use: Yes     Types: Marijuana       Review of Systems   Constitutional: Negative for chills, diaphoresis and fever  HENT: Negative for congestion and sore throat  Respiratory: Negative for cough, shortness of breath, wheezing and stridor  Cardiovascular: Negative for chest pain, palpitations and leg swelling  Gastrointestinal: Negative for abdominal pain, blood in stool, diarrhea, nausea and vomiting  Genitourinary: Positive for flank pain  Negative for dysuria, frequency and urgency  Musculoskeletal: Negative for neck pain and neck stiffness  Skin: Negative for pallor and rash  Neurological: Negative for dizziness, syncope, weakness, light-headedness and headaches  All other systems reviewed and are negative  Physical Exam  Physical Exam   Constitutional: She is oriented to person, place, and time  She appears well-developed and well-nourished     HENT:   Head: Normocephalic and atraumatic  Eyes: Pupils are equal, round, and reactive to light  Neck: Neck supple  Cardiovascular: Normal rate, regular rhythm, normal heart sounds and intact distal pulses  Pulmonary/Chest: Effort normal and breath sounds normal  No respiratory distress  Abdominal: Soft  Bowel sounds are normal  There is no tenderness  Musculoskeletal: Normal range of motion  She exhibits no edema, tenderness or deformity  Neurological: She is alert and oriented to person, place, and time  Skin: Skin is warm and dry  Capillary refill takes less than 2 seconds  No rash noted  No erythema  No pallor  Vitals reviewed        Vital Signs  ED Triage Vitals [07/25/20 1949]   Temperature Pulse Respirations Blood Pressure SpO2   98 5 °F (36 9 °C) 90 20 (!) 187/132 96 %      Temp Source Heart Rate Source Patient Position - Orthostatic VS BP Location FiO2 (%)   Temporal Monitor Sitting Right arm --      Pain Score       8           Vitals:    07/25/20 1949 07/25/20 2300   BP: (!) 187/132 (!) 180/94   Pulse: 90 (!) 109   Patient Position - Orthostatic VS: Sitting Lying         Visual Acuity      ED Medications  Medications   morphine (PF) 4 mg/mL injection 4 mg (4 mg Intravenous Given 7/25/20 2020)   ondansetron (ZOFRAN) injection 4 mg (4 mg Intravenous Given 7/25/20 2021)   sodium chloride 0 9 % bolus 1,000 mL (0 mL Intravenous Stopped 7/25/20 2358)   iohexol (OMNIPAQUE) 350 MG/ML injection (MULTI-DOSE) 100 mL (100 mL Intravenous Given 7/25/20 2142)   EPINEPHrine PF (ADRENALIN) 1 mg/mL injection 0 5 mg (0 5 mg Intramuscular Given 7/25/20 2147)   methylPREDNISolone sodium succinate (Solu-MEDROL) injection 125 mg (125 mg Intravenous Given 7/25/20 2149)   diphenhydrAMINE (BENADRYL) injection 50 mg (50 mg Intravenous Given 7/25/20 2147)       Diagnostic Studies  Results Reviewed     Procedure Component Value Units Date/Time    Comprehensive metabolic panel [[id number]]  (Abnormal) Collected:  07/25/20 2016 Lab Status:  Final result Specimen:  Blood from Arm, Right Updated:  07/25/20 2112     Sodium 139 mmol/L      Potassium 3 8 mmol/L      Chloride 100 mmol/L      CO2 27 mmol/L      ANION GAP 12 mmol/L      BUN 11 mg/dL      Creatinine 0 91 mg/dL      Glucose 109 mg/dL      Calcium 9 6 mg/dL      AST 74 U/L       U/L      Alkaline Phosphatase 69 U/L      Total Protein 8 2 g/dL      Albumin 4 0 g/dL      Total Bilirubin 0 60 mg/dL      eGFR 83 ml/min/1 73sq m     Narrative:       National Kidney Disease Foundation guidelines for Chronic Kidney Disease (CKD):     Stage 1 with normal or high GFR (GFR > 90 mL/min/1 73 square meters)    Stage 2 Mild CKD (GFR = 60-89 mL/min/1 73 square meters)    Stage 3A Moderate CKD (GFR = 45-59 mL/min/1 73 square meters)    Stage 3B Moderate CKD (GFR = 30-44 mL/min/1 73 square meters)    Stage 4 Severe CKD (GFR = 15-29 mL/min/1 73 square meters)    Stage 5 End Stage CKD (GFR <15 mL/min/1 73 square meters)  Note: GFR calculation is accurate only with a steady state creatinine    Lipase [[id number]]  (Normal) Collected:  07/25/20 2016    Lab Status:  Final result Specimen:  Blood from Arm, Right Updated:  07/25/20 2112     Lipase 158 u/L     D-Dimer [[id number]]  (Normal) Collected:  07/25/20 2016    Lab Status:  Final result Specimen:  Blood from Arm, Right Updated:  07/25/20 2109     D-Dimer, Quant <0 27 ug/ml FEU     CBC and differential [[id number]]  (Abnormal) Collected:  07/25/20 2017    Lab Status:  Final result Specimen:  Blood from Arm, Right Updated:  07/25/20 2045     WBC 8 78 Thousand/uL      RBC 4 97 Million/uL      Hemoglobin 15 6 g/dL      Hematocrit 45 9 %      MCV 92 fL      MCH 31 4 pg      MCHC 34 0 g/dL      RDW 14 9 %      MPV 10 3 fL      Platelets 101 Thousands/uL      nRBC 0 /100 WBCs      Neutrophils Relative 63 %      Immat GRANS % 0 %      Lymphocytes Relative 26 %      Monocytes Relative 10 %      Eosinophils Relative 0 %      Basophils Relative 1 % Neutrophils Absolute 5 58 Thousands/µL      Immature Grans Absolute 0 03 Thousand/uL      Lymphocytes Absolute 2 27 Thousands/µL      Monocytes Absolute 0 83 Thousand/µL      Eosinophils Absolute 0 00 Thousand/µL      Basophils Absolute 0 07 Thousands/µL     POCT urinalysis dipstick [[id number]]  (Abnormal) Resulted:  07/25/20 2030    Lab Status:  Final result Specimen:  Urine Updated:  07/25/20 2031     Color, UA yellow     Clarity, UA clear     Glucose, UA (Ref: Negative) negative     Bilirubin, UA (Ref: Negative) negative     Ketones, UA (Ref: Negative) negative     Spec Grav, UA (Ref:1 003-1 030) 1 015     Blood, UA (Ref: Negative) MODERATE     pH, UA (Ref: 4 5-8 0) 6     Protein, UA (Ref: Negative) trace     Urobilinogen, UA (Ref: 0 2- 1 0) 0 2      Leukocytes, UA (Ref: Negative) negative     Nitrite, UA (Ref: Negative) negative    POCT pregnancy, urine [[id number]]  (Normal) Resulted:  07/25/20 2030    Lab Status:  Final result Updated:  07/25/20 2030     EXT PREG TEST UR (Ref: Negative) negative     Control valid                 CT abdomen pelvis with contrast   Final Result by Jamie Sanchez DO (07/25 2300)      Fatty infiltration of the liver is suspected  In the setting of abdominal pain and/or elevated liver function tests, consider steatohepatitis  No acute process seen otherwise  Other findings as above  Workstation performed: [id number]                    Procedures  Procedures         ED Course  ED Course as of Jul 26 2256   Sat Jul 25, 2020 2146 Patient complaining of swelling around her eyes  Also complaining of tongue swelling  Will give dose of epinephrine, Benadryl, steroids      2353 Patient feels better  Allergic reaction improved  Would like to go home  Return precautions explained  US AUDIT      Most Recent Value   Initial Alcohol Screen: US AUDIT-C    1  How often do you have a drink containing alcohol?  0 Filed at: 07/25/2020 2031   2   How many drinks containing alcohol do you have on a typical day you are drinking? 0 Filed at: 07/25/2020 2031   3a  Male UNDER 65: How often do you have five or more drinks on one occasion? 0 Filed at: 07/25/2020 2031   3b  FEMALE Any Age, or MALE 65+: How often do you have 4 or more drinks on one occassion? 0 Filed at: 07/25/2020 2031   Audit-C Score  0 Filed at: 07/25/2020 2031                  GOLDIE/DAST-10      Most Recent Value   How many times in the past year have you    Used an illegal drug or used a prescription medication for non-medical reasons? Never Filed at: 07/25/2020 2031                                MDM      Disposition  Final diagnoses:   Fatty liver   Flank pain   Anaphylaxis, initial encounter     Time reflects when diagnosis was documented in both MDM as applicable and the Disposition within this note     Time User Action Codes Description Comment    7/25/2020 11:53 PM Candice Blackmon Add [K76 0] Fatty liver     7/25/2020 11:53 PM Candice Blackmon Add [R10 9] Flank pain     7/25/2020 11:54 PM Candice Blackmon Add [T78  2XXA] Anaphylaxis, initial encounter       ED Disposition     ED Disposition Condition Date/Time Comment    Discharge Stable Sat Jul 25, 2020 11:[facility]              Follow-up Information     Follow up With Specialties Details Why Contact Info Additional Information    Judit Moy, [address], Nurse Practitioner Call in 2 days Please call and follow-up with family doctor Sharon  [address] [place] [address] [phone]        [address] [facility] Emergency Department Emergency Medicine  As needed, If symptoms worsen [address] [place], [id number]  [phone] [address] ED, [address] [place] [address], Preeti Aggarwal T[address]          Discharge Medication List as of 7/25/2020 11:58 PM      START taking these medications    Details   cyclobenzaprine (FLEXERIL) 5 mg tablet Take 1 tablet (5 mg total) by mouth 3 (three) times a day as needed for muscle spasms for up to 5 days, Starting Sat 7/25/2020, Until Thu 7/30/2020, Print      diphenhydrAMINE (BENADRYL) 25 mg tablet Take 1 tablet (25 mg total) by mouth every 6 (six) hours as needed for itching, Starting Sat 7/25/2020, Print      EPINEPHrine (EPIPEN) 0 3 mg/0 3 mL SOAJ Inject 0 3 mL (0 3 mg total) into a muscle once for 1 dose, Starting Sun 7/26/2020, Print      predniSONE 20 mg tablet Take 2 tablets (40 mg total) by mouth daily for 4 days, Starting Sat 7/25/2020, Until Wed 7/29/2020, Print         CONTINUE these medications which have NOT CHANGED    Details   fluticasone (FLONASE) 50 mcg/act nasal spray 1 spray into each nostril daily, Historical Med      levothyroxine 50 mcg tablet TAKE 1 TABLET BY MOUTH EVERY DAY, Normal      Prenatal Vit-Fe Fumarate-FA (PRENATAL PO) Take 1 tablet by mouth daily, Historical Med      venlafaxine (EFFEXOR-XR) 75 mg 24 hr capsule TAKE 1 CAPSULE BY MOUTH EVERY DAY, Normal           No discharge procedures on file      PDMP Review     None          ED Provider  Electronically Signed by           Eyal Ramesh MD  07/26/20 0318

## 2020-07-28 ENCOUNTER — VBI (OUTPATIENT)
Dept: FAMILY MEDICINE CLINIC | Facility: HOSPITAL | Age: 35
End: 2020-07-28

## 2020-07-28 NOTE — TELEPHONE ENCOUNTER
Bridgette Henriquez    ED Visit Information     Ed visit date: 7/25/2020  Diagnosis Description:   Fatty liver; Flank pain; Anaphylaxis, initial encounter     In Network? Yes [address]  Discharge status: Home  Discharged with meds ? Yes  Number of ED visits to date: 1  ED Severity:n/a     Outreach Information    Outreach successful: No 2  Date letter mailed:N/a  Date Finalized:7/30/2020    Care Coordination    Follow up appointment with pcp: no No ED f/u appt scheduled  Transportation issues ? NA    Value [employer] type:  7 Day Outreach  Emergent necessity warranted by diagnosis:  No  [facility] PCP:  Yes  Transportation:  Friend/Family Transport  07/28/2020 12:48 PM Phone (Maulikon Gopal) Gary Perry (Self) [phone] (H)   Phone rang once and call was disconnected x 2  Unable to reach patient regarding his recent ED visit on 7/25/2020 for Fatty liver; Flank pain; Anaphylaxis, initial encounter  Patient was discharged with cyclobenzaprine and dexamethasone and advised to follow up with PCP  2nd attempt will be made on 7/29/2020 07/30/2020 10:03 AM Phone (Maulikon Gopal) Gary Perry (Self) [phone] (H)   Left Message  Unable to reach patient regarding his recent ED visit on 7/25/2020 for Fatty liver; Flank pain; Anaphylaxis, initial encounter  Patient was discharged with cyclobenzaprine and dexamethasone and advised to follow up with PCP   Follow up letter not mailed due to COVID 19 stay in place order

## 2020-08-05 DIAGNOSIS — F41.1 GENERALIZED ANXIETY DISORDER: ICD-10-CM

## 2020-08-07 RX ORDER — VENLAFAXINE HYDROCHLORIDE 75 MG/1
CAPSULE, EXTENDED RELEASE ORAL
Qty: 30 CAPSULE | Refills: 0 | Status: SHIPPED | OUTPATIENT
Start: 2020-08-07 | End: 2020-08-11 | Stop reason: SDUPTHER

## 2020-08-07 NOTE — TELEPHONE ENCOUNTER
LM for patient  I also wanted to clarify her upcoming appt with Dr Severino Child on 8/11/20  It looks like it was scheduled through mychart but she doesn't have an active mychart

## 2020-08-07 NOTE — TELEPHONE ENCOUNTER
She was due for med check in June, so please call her to schedule  I will issue 1 month refill in the meantime

## 2020-08-11 ENCOUNTER — OFFICE VISIT (OUTPATIENT)
Dept: FAMILY MEDICINE CLINIC | Facility: HOSPITAL | Age: 35
End: 2020-08-11
Payer: COMMERCIAL

## 2020-08-11 VITALS
SYSTOLIC BLOOD PRESSURE: 180 MMHG | TEMPERATURE: 98.4 F | HEIGHT: 65 IN | DIASTOLIC BLOOD PRESSURE: 114 MMHG | WEIGHT: 293 LBS | BODY MASS INDEX: 48.82 KG/M2 | HEART RATE: 100 BPM

## 2020-08-11 DIAGNOSIS — F41.1 GENERALIZED ANXIETY DISORDER: ICD-10-CM

## 2020-08-11 DIAGNOSIS — E03.9 HYPOTHYROIDISM, UNSPECIFIED TYPE: Primary | ICD-10-CM

## 2020-08-11 DIAGNOSIS — M54.6 ACUTE LEFT-SIDED THORACIC BACK PAIN: ICD-10-CM

## 2020-08-11 DIAGNOSIS — I10 ESSENTIAL HYPERTENSION: ICD-10-CM

## 2020-08-11 DIAGNOSIS — R74.01 TRANSAMINITIS: ICD-10-CM

## 2020-08-11 DIAGNOSIS — E66.01 MORBID OBESITY WITH BMI OF 50.0-59.9, ADULT (HCC): ICD-10-CM

## 2020-08-11 DIAGNOSIS — L65.9 HAIR LOSS: ICD-10-CM

## 2020-08-11 DIAGNOSIS — K76.0 FATTY LIVER: ICD-10-CM

## 2020-08-11 PROCEDURE — 3008F BODY MASS INDEX DOCD: CPT | Performed by: INTERNAL MEDICINE

## 2020-08-11 PROCEDURE — 3080F DIAST BP >= 90 MM HG: CPT | Performed by: INTERNAL MEDICINE

## 2020-08-11 PROCEDURE — 99215 OFFICE O/P EST HI 40 MIN: CPT | Performed by: INTERNAL MEDICINE

## 2020-08-11 PROCEDURE — 3077F SYST BP >= 140 MM HG: CPT | Performed by: INTERNAL MEDICINE

## 2020-08-11 RX ORDER — VENLAFAXINE HYDROCHLORIDE 150 MG/1
150 CAPSULE, EXTENDED RELEASE ORAL DAILY
Qty: 30 CAPSULE | Refills: 1 | Status: SHIPPED | OUTPATIENT
Start: 2020-08-11 | End: 2020-10-05

## 2020-08-11 RX ORDER — BISOPROLOL FUMARATE 5 MG/1
5 TABLET ORAL DAILY
Qty: 30 TABLET | Refills: 1 | Status: SHIPPED | OUTPATIENT
Start: 2020-08-11 | End: 2020-09-10 | Stop reason: SDUPTHER

## 2020-08-11 RX ORDER — LEVOTHYROXINE SODIUM 0.05 MG/1
50 TABLET ORAL DAILY
Qty: 30 TABLET | Refills: 2 | Status: SHIPPED | OUTPATIENT
Start: 2020-08-11 | End: 2020-12-07

## 2020-08-11 NOTE — PROGRESS NOTES
Assessment/Plan:    Hypothyroidism  Has some symptoms of thyroid dz but can also be seen with anxiety - will check TFT's and thyroid Ab, con't current Levothyroxine for now    Essential hypertension  Start  Bisoprolol- may see benefit with BP and HR and some anxiety/palp symptoms, recheck BP in 4 wks, diet/exercise/wgt loss encouraged       Diagnoses and all orders for this visit:    Hypothyroidism, unspecified type  -     TSH, 3rd generation; Future  -     T4, free; Future  -     Thyroid stimulating immunoglobulin; Future  -     Thyroid Peroxidase and Thyroglobulin Antibodies; Future  -     Comprehensive metabolic panel  -     Lipid panel  -     Hemoglobin A1C With EAG; Future  -     TSH, 3rd generation  -     T4, free  -     Thyroid stimulating immunoglobulin  -     Thyroid Peroxidase and Thyroglobulin Antibodies  -     Hemoglobin A1C With EAG  -     Iron, TIBC and Ferritin Panel; Future  -     Iron, TIBC and Ferritin Panel  -     levothyroxine 50 mcg tablet; Take 1 tablet (50 mcg total) by mouth daily    Hair loss  Comments:  Check TSH and iron studies, may be related to delivery of baby 11 mos ago as well  Orders:  -     TSH, 3rd generation; Future  -     T4, free; Future  -     Thyroid stimulating immunoglobulin; Future  -     Thyroid Peroxidase and Thyroglobulin Antibodies; Future  -     Comprehensive metabolic panel  -     Lipid panel  -     Hemoglobin A1C With EAG; Future  -     TSH, 3rd generation  -     T4, free  -     Thyroid stimulating immunoglobulin  -     Thyroid Peroxidase and Thyroglobulin Antibodies  -     Hemoglobin A1C With EAG  -     Iron, TIBC and Ferritin Panel; Future  -     Iron, TIBC and Ferritin Panel    Transaminitis  Comments:  Likely c/w fatty liver - recheck CMP, diet/exercise/wgt loss encouraged  Orders:  -     TSH, 3rd generation; Future  -     T4, free; Future  -     Thyroid stimulating immunoglobulin; Future  -     Thyroid Peroxidase and Thyroglobulin Antibodies;  Future  - Comprehensive metabolic panel  -     Lipid panel  -     Hemoglobin A1C With EAG; Future  -     TSH, 3rd generation  -     T4, free  -     Thyroid stimulating immunoglobulin  -     Thyroid Peroxidase and Thyroglobulin Antibodies  -     Hemoglobin A1C With EAG    Generalized anxiety disorder  Comments:  Mood not at goal, increase Effexor and recheck in 4 wks, call with SE/new/worse mood  Orders:  -     venlafaxine (EFFEXOR-XR) 150 mg 24 hr capsule; Take 1 capsule (150 mg total) by mouth daily    Fatty liver  Comments:  Diet/exercise/wgt loss encouraged  Orders:  -     TSH, 3rd generation; Future  -     T4, free; Future  -     Thyroid stimulating immunoglobulin; Future  -     Thyroid Peroxidase and Thyroglobulin Antibodies; Future  -     Comprehensive metabolic panel  -     Lipid panel  -     Hemoglobin A1C With EAG; Future  -     TSH, 3rd generation  -     T4, free  -     Thyroid stimulating immunoglobulin  -     Thyroid Peroxidase and Thyroglobulin Antibodies  -     Hemoglobin A1C With EAG    Morbid obesity with BMI of 50 0-59 9, adult (HCC)  Comments:  Diet/exercise/wgt loss encouraged  Orders:  -     TSH, 3rd generation; Future  -     T4, free; Future  -     Thyroid stimulating immunoglobulin; Future  -     Thyroid Peroxidase and Thyroglobulin Antibodies; Future  -     Comprehensive metabolic panel  -     Lipid panel  -     Hemoglobin A1C With EAG; Future  -     TSH, 3rd generation  -     T4, free  -     Thyroid stimulating immunoglobulin  -     Thyroid Peroxidase and Thyroglobulin Antibodies  -     Hemoglobin A1C With EAG    Essential hypertension  -     bisoprolol (ZEBETA) 5 mg tablet; Take 1 tablet (5 mg total) by mouth daily    Acute left-sided thoracic back pain  Comments:  C/w musculoskeletal pain - no benefit with Tylenol/NSAID - needs PT - order given, if not better may need further imaging, re-eval in 4 wks  Orders:  -     Ambulatory referral to Physical Therapy;  Future    Other orders  -     Multiple Vitamins-Minerals (MULTIVITAMIN ADULT PO); Take 1 tablet by mouth daily      I have spent 50 minutes with Patient and family today in which greater than 50% of this time was spent in counseling/coordination of care regarding Diagnostic results, Prognosis, Risks and benefits of tx options, Intructions for management, Patient and family education, Importance of tx compliance, Risk factor reductions and Impressions  Subjective:      Patient ID: Kenny Marie is a 37 y o  female  HPI Pt here with mult concerns    She is due for thyroid studies and is asking for thyroid tests  She notes  hair loss and she has chronic icthyosis so has had no skin changes other then that  She has fatigue but she relates that to working and 2 kids (11 mo and 3 yrs)  She notes no C/D but does have some tremor and shakiness which she thinks is more mood related  She is taking her Effexor daily  She felt it was working well earlier on but the past month or so she feels more anxious and having panic attacks  She notes no down/sad mood  She cannot id a specific trigger that occurred a month ago making the anxiety worse  She has never seen a therapist and is deferring at this time  ER visit reviewed  CT A/P and BW results reviewed with pt  She notes her L thoracic pain is unchanged - now up to approx 8 wks  She notes the pain is L mid back and will intermittent radiate to LUQ  She notes no numbness/weakness but she has some tingling B/L at her feet but she cannot say exactly where in her feet  She was told she has a little bit of redness to her complexion which is not abnormal to her  BP very elevated again today  She admits she is anxious - marcela at doc appt  She had no BP issues but did have BS issues and was on insulin during her pregnancy  Review of Systems   Constitutional: Negative for chills and fever  HENT: Negative for congestion and sore throat  Eyes: Negative for pain and visual disturbance  Respiratory: Negative for cough, shortness of breath and wheezing  Cardiovascular: Positive for palpitations  Negative for chest pain and leg swelling  Gastrointestinal: Negative for abdominal pain, blood in stool, constipation, diarrhea and nausea  Endocrine: Negative for polydipsia and polyuria  Genitourinary: Negative for difficulty urinating and dysuria  Musculoskeletal: Positive for back pain  Negative for neck pain  Skin: Negative for rash and wound  Neurological: Negative for dizziness, weakness, light-headedness, numbness and headaches  Hematological: Negative for adenopathy  Psychiatric/Behavioral: Negative for behavioral problems and confusion  The patient is nervous/anxious  Objective:    BP (!) 180/114   Pulse 100   Temp 98 4 °F (36 9 °C)   Ht 5' 4 5" (1 638 m)   Wt 134 kg (296 lb)   LMP 07/25/2020   BMI 50 02 kg/m²      Physical Exam  Vitals signs and nursing note reviewed  Constitutional:       General: She is not in acute distress  Appearance: She is well-developed  She is obese  She is not ill-appearing  HENT:      Head: Normocephalic and atraumatic  Right Ear: Tympanic membrane and ear canal normal       Left Ear: Tympanic membrane and ear canal normal    Eyes:      General:         Right eye: No discharge  Left eye: No discharge  Conjunctiva/sclera: Conjunctivae normal    Neck:      Musculoskeletal: Neck supple  Trachea: No tracheal deviation  Comments: No thyromegaly noted  Cardiovascular:      Rate and Rhythm: Regular rhythm  Tachycardia present  Heart sounds: Normal heart sounds  No murmur  No friction rub  Pulmonary:      Effort: Pulmonary effort is normal  No respiratory distress  Breath sounds: Normal breath sounds  No wheezing, rhonchi or rales  Abdominal:      General: There is no distension  Palpations: Abdomen is soft  Tenderness: There is no abdominal tenderness   There is no guarding or rebound  Musculoskeletal:      Comments: Pain located L mid thoracic region - approx T6-8, no pain with palp of spinous processes   Lymphadenopathy:      Cervical: No cervical adenopathy  Skin:     General: Skin is warm  Coloration: Skin is not pale  Comments: Small erythematous round macule L gastrocnemeus region with collarette of white scaly skin   Neurological:      General: No focal deficit present  Mental Status: She is alert  Motor: No abnormal muscle tone  Gait: Gait normal    Psychiatric:         Thought Content:  Thought content normal       Comments: mildl anxious

## 2020-08-11 NOTE — ASSESSMENT & PLAN NOTE
Start  Bisoprolol- may see benefit with BP and HR and some anxiety/palp symptoms, recheck BP in 4 wks, diet/exercise/wgt loss encouraged

## 2020-08-11 NOTE — ASSESSMENT & PLAN NOTE
Has some symptoms of thyroid dz but can also be seen with anxiety - will check TFT's and thyroid Ab, con't current Levothyroxine for now

## 2020-08-30 DIAGNOSIS — F41.1 GENERALIZED ANXIETY DISORDER: ICD-10-CM

## 2020-08-30 RX ORDER — VENLAFAXINE HYDROCHLORIDE 75 MG/1
CAPSULE, EXTENDED RELEASE ORAL
Qty: 30 CAPSULE | Refills: 0 | Status: SHIPPED | OUTPATIENT
Start: 2020-08-30 | End: 2020-09-10

## 2020-09-10 ENCOUNTER — OFFICE VISIT (OUTPATIENT)
Dept: FAMILY MEDICINE CLINIC | Facility: HOSPITAL | Age: 35
End: 2020-09-10
Payer: COMMERCIAL

## 2020-09-10 VITALS
TEMPERATURE: 96.9 F | DIASTOLIC BLOOD PRESSURE: 118 MMHG | HEIGHT: 65 IN | BODY MASS INDEX: 47.72 KG/M2 | SYSTOLIC BLOOD PRESSURE: 176 MMHG | WEIGHT: 286.4 LBS | HEART RATE: 80 BPM

## 2020-09-10 DIAGNOSIS — K59.00 CONSTIPATION, UNSPECIFIED CONSTIPATION TYPE: ICD-10-CM

## 2020-09-10 DIAGNOSIS — Z80.0 FAMILY HISTORY OF RECTAL CANCER: ICD-10-CM

## 2020-09-10 DIAGNOSIS — I10 ESSENTIAL HYPERTENSION: ICD-10-CM

## 2020-09-10 DIAGNOSIS — F41.1 GENERALIZED ANXIETY DISORDER: Primary | ICD-10-CM

## 2020-09-10 PROCEDURE — 99214 OFFICE O/P EST MOD 30 MIN: CPT | Performed by: INTERNAL MEDICINE

## 2020-09-10 RX ORDER — CLONAZEPAM 0.5 MG/1
0.5 TABLET ORAL 2 TIMES DAILY
Qty: 60 TABLET | Refills: 1 | Status: SHIPPED | OUTPATIENT
Start: 2020-09-10 | End: 2021-06-09 | Stop reason: SDUPTHER

## 2020-09-10 RX ORDER — BISOPROLOL FUMARATE 10 MG/1
10 TABLET ORAL DAILY
Qty: 30 TABLET | Refills: 1 | Status: SHIPPED | OUTPATIENT
Start: 2020-09-10 | End: 2020-11-07

## 2020-09-10 NOTE — ASSESSMENT & PLAN NOTE
Bp still elevated - increase Bisoprolol from 5 to 10 mg 1 tab PO q day, re-eval in 4 wks, call with SE

## 2020-09-10 NOTE — ASSESSMENT & PLAN NOTE
Anxiety not at goal despite increase in Effexor, will add Clonazepam, PDMP Rx website accessed and no red flag use noted, SE/sedation/habit forming nature reviewed, re-eval in 4 wks, call with SE/new/worse mood

## 2020-09-10 NOTE — PROGRESS NOTES
Assessment/Plan:    Generalized anxiety disorder  Anxiety not at goal despite increase in Effexor, will add Clonazepam, PDMP Rx website accessed and no red flag use noted, SE/sedation/habit forming nature reviewed, re-eval in 4 wks, call with SE/new/worse mood    Essential hypertension  Bp still elevated - increase Bisoprolol from 5 to 10 mg 1 tab PO q day, re-eval in 4 wks, call with SE       Diagnoses and all orders for this visit:    Generalized anxiety disorder  -     clonazePAM (KlonoPIN) 0 5 mg tablet; Take 1 tablet (0 5 mg total) by mouth 2 (two) times a day    Essential hypertension  -     bisoprolol (ZEBETA) 10 MG tablet; Take 1 tablet (10 mg total) by mouth daily    Constipation, unspecified constipation type  Comments:  D/w pt that s/sx sound  more c/w IBD-C but d/t family history seeing GI for eval is reasonable, encouraged high fiber diet/exercise/wgt loss/water intake  Orders:  -     Ambulatory referral to Gastroenterology; Future    Family history of rectal cancer  -     Ambulatory referral to Gastroenterology; Future      Cervical cancer screening - had PAP with last pregnancy - dgtr not even one  Saw Henrik Farley - Women's Specialist at [facility]      Subjective:      Patient ID: Kenny Marie is a 37 y o  female  HPI Pt here for follow up appt    She did her BW yesterday - no results in chart yet    We did increase her Effexor last visit  Anxiety still not at goal    She notes no SE with the increase in the medication  Notes a lot more issues with anxiety  She con't to have issues in the car/driving  She has had panic attacks related to the car  She does not feel down/sad and denies SI       BP again elevated despite starting Bisoprolol last visit  She notes no SE with the medication  She does not check BP at home  She notes no HA's/dizziness/double vision/CP  She notes issues with changes in stools - constipation    She has some abd pain (LUQ) and when she has a BM she notes the pain improves  She is anxious as her father had rectal cancer and she is concerned she has colon cancer  She feels "something is wrong"  She notes no blood in stool/black stools  She is requesting GI for discussion of colonosocpy    Cervical cancer screening - had PAP with last pregnancy - dgtr not even one  Saw Bernie Robles - Women's Specialist at [facility]   Constitutional: Negative for chills, fever and unexpected weight change  HENT: Negative for congestion and sore throat  Eyes: Negative for pain and visual disturbance  Respiratory: Negative for cough and shortness of breath  Cardiovascular: Negative for chest pain and palpitations  Gastrointestinal: Positive for abdominal pain and constipation  Negative for blood in stool, diarrhea, nausea and vomiting  Genitourinary: Negative for difficulty urinating and dysuria  Musculoskeletal: Positive for back pain  Negative for neck pain  Skin: Negative for rash and wound  Neurological: Negative for dizziness, light-headedness and headaches  Hematological: Negative for adenopathy  Psychiatric/Behavioral: Negative for behavioral problems, confusion, dysphoric mood and suicidal ideas  The patient is nervous/anxious  Objective:    BP (!) 176/118   Pulse 80   Temp (!) 96 9 °F (36 1 °C)   Ht 5' 4 5" (1 638 m)   Wt 130 kg (286 lb 6 4 oz)   BMI 48 40 kg/m²      Physical Exam  Vitals signs and nursing note reviewed  Constitutional:       General: She is not in acute distress  Appearance: She is well-developed  HENT:      Head: Normocephalic and atraumatic  Eyes:      General:         Right eye: No discharge  Left eye: No discharge  Conjunctiva/sclera: Conjunctivae normal    Neck:      Musculoskeletal: Neck supple  Trachea: No tracheal deviation  Cardiovascular:      Rate and Rhythm: Normal rate and regular rhythm  Heart sounds: Normal heart sounds  No murmur  No friction rub  Pulmonary:      Effort: Pulmonary effort is normal  No respiratory distress  Breath sounds: Normal breath sounds  No wheezing, rhonchi or rales  Abdominal:      General: There is no distension  Palpations: Abdomen is soft  Tenderness: There is no abdominal tenderness  There is no guarding or rebound  Skin:     General: Skin is warm and dry  Findings: No rash  Neurological:      General: No focal deficit present  Mental Status: She is alert  Motor: No abnormal muscle tone  Gait: Gait normal    Psychiatric:         Thought Content:  Thought content normal       Comments: tearful

## 2020-09-15 LAB
ALBUMIN SERPL-MCNC: 4.4 G/DL (ref 3.6–5.1)
ALBUMIN/GLOB SERPL: 1.6 (CALC) (ref 1–2.5)
ALP SERPL-CCNC: 57 U/L (ref 31–125)
ALT SERPL-CCNC: 97 U/L (ref 6–29)
AST SERPL-CCNC: 102 U/L (ref 10–30)
BILIRUB SERPL-MCNC: 0.4 MG/DL (ref 0.2–1.2)
BUN SERPL-MCNC: 6 MG/DL (ref 7–25)
BUN/CREAT SERPL: 8 (CALC) (ref 6–22)
CALCIUM SERPL-MCNC: 9.1 MG/DL (ref 8.6–10.2)
CHLORIDE SERPL-SCNC: 103 MMOL/L (ref 98–110)
CHOLEST SERPL-MCNC: 224 MG/DL
CHOLEST/HDLC SERPL: 2.1 (CALC)
CO2 SERPL-SCNC: 23 MMOL/L (ref 20–32)
CREAT SERPL-MCNC: 0.74 MG/DL (ref 0.5–1.1)
EST. AVERAGE GLUCOSE BLD GHB EST-MCNC: 100 (CALC)
EST. AVERAGE GLUCOSE BLD GHB EST-SCNC: 5.5 (CALC)
FERRITIN SERPL-MCNC: 23 NG/ML (ref 16–154)
GLOBULIN SER CALC-MCNC: 2.8 G/DL (CALC) (ref 1.9–3.7)
GLUCOSE SERPL-MCNC: 81 MG/DL (ref 65–99)
HBA1C MFR BLD: 5.1 % OF TOTAL HGB
HDLC SERPL-MCNC: 106 MG/DL
IRON SATN MFR SERPL: 25 % (CALC) (ref 16–45)
IRON SERPL-MCNC: 112 MCG/DL (ref 40–190)
LDLC SERPL CALC-MCNC: 94 MG/DL (CALC)
NONHDLC SERPL-MCNC: 118 MG/DL (CALC)
POTASSIUM SERPL-SCNC: 4.1 MMOL/L (ref 3.5–5.3)
PROT SERPL-MCNC: 7.2 G/DL (ref 6.1–8.1)
SL AMB EGFR AFRICAN AMERICAN: 123 ML/MIN/1.73M2
SL AMB EGFR NON AFRICAN AMERICAN: 106 ML/MIN/1.73M2
SODIUM SERPL-SCNC: 138 MMOL/L (ref 135–146)
T4 FREE SERPL-MCNC: 1.3 NG/DL (ref 0.8–1.8)
THYROGLOB AB SERPL-ACNC: 2 IU/ML
THYROPEROXIDASE AB SERPL-ACNC: <1 IU/ML
TIBC SERPL-MCNC: 446 MCG/DL (CALC) (ref 250–450)
TRIGL SERPL-MCNC: 144 MG/DL
TSH SERPL-ACNC: 2.67 MIU/L
TSI SER-ACNC: <89 % BASELINE

## 2020-10-03 DIAGNOSIS — F41.1 GENERALIZED ANXIETY DISORDER: ICD-10-CM

## 2020-10-03 DIAGNOSIS — I10 ESSENTIAL HYPERTENSION: ICD-10-CM

## 2020-10-05 RX ORDER — BISOPROLOL FUMARATE 5 MG/1
TABLET ORAL
Qty: 30 TABLET | Refills: 1 | Status: SHIPPED | OUTPATIENT
Start: 2020-10-05 | End: 2020-10-12

## 2020-10-05 RX ORDER — VENLAFAXINE HYDROCHLORIDE 150 MG/1
CAPSULE, EXTENDED RELEASE ORAL
Qty: 30 CAPSULE | Refills: 1 | Status: SHIPPED | OUTPATIENT
Start: 2020-10-05 | End: 2020-12-18

## 2020-10-12 ENCOUNTER — OFFICE VISIT (OUTPATIENT)
Dept: FAMILY MEDICINE CLINIC | Facility: HOSPITAL | Age: 35
End: 2020-10-12
Payer: COMMERCIAL

## 2020-10-12 VITALS
SYSTOLIC BLOOD PRESSURE: 150 MMHG | HEIGHT: 65 IN | TEMPERATURE: 96.1 F | HEART RATE: 80 BPM | DIASTOLIC BLOOD PRESSURE: 106 MMHG | BODY MASS INDEX: 47.92 KG/M2 | WEIGHT: 287.6 LBS

## 2020-10-12 DIAGNOSIS — E03.9 HYPOTHYROIDISM, UNSPECIFIED TYPE: ICD-10-CM

## 2020-10-12 DIAGNOSIS — I10 ESSENTIAL HYPERTENSION: ICD-10-CM

## 2020-10-12 DIAGNOSIS — L67.8 ABNORMAL FACIAL HAIR: ICD-10-CM

## 2020-10-12 DIAGNOSIS — R74.01 TRANSAMINITIS: ICD-10-CM

## 2020-10-12 DIAGNOSIS — F41.1 GENERALIZED ANXIETY DISORDER: Primary | ICD-10-CM

## 2020-10-12 DIAGNOSIS — Z23 ENCOUNTER FOR IMMUNIZATION: ICD-10-CM

## 2020-10-12 PROCEDURE — 99214 OFFICE O/P EST MOD 30 MIN: CPT | Performed by: INTERNAL MEDICINE

## 2020-10-12 PROCEDURE — 90686 IIV4 VACC NO PRSV 0.5 ML IM: CPT | Performed by: INTERNAL MEDICINE

## 2020-10-12 PROCEDURE — 3080F DIAST BP >= 90 MM HG: CPT | Performed by: INTERNAL MEDICINE

## 2020-10-12 PROCEDURE — 90471 IMMUNIZATION ADMIN: CPT | Performed by: INTERNAL MEDICINE

## 2020-11-07 DIAGNOSIS — I10 ESSENTIAL HYPERTENSION: ICD-10-CM

## 2020-11-07 RX ORDER — BISOPROLOL FUMARATE 10 MG/1
TABLET ORAL
Qty: 30 TABLET | Refills: 1 | Status: SHIPPED | OUTPATIENT
Start: 2020-11-07 | End: 2021-01-10

## 2020-12-07 DIAGNOSIS — E03.9 HYPOTHYROIDISM, UNSPECIFIED TYPE: ICD-10-CM

## 2020-12-07 RX ORDER — LEVOTHYROXINE SODIUM 0.05 MG/1
TABLET ORAL
Qty: 30 TABLET | Refills: 2 | Status: SHIPPED | OUTPATIENT
Start: 2020-12-07 | End: 2021-03-12

## 2020-12-18 DIAGNOSIS — F41.1 GENERALIZED ANXIETY DISORDER: ICD-10-CM

## 2020-12-18 RX ORDER — VENLAFAXINE HYDROCHLORIDE 150 MG/1
CAPSULE, EXTENDED RELEASE ORAL
Qty: 30 CAPSULE | Refills: 0 | Status: SHIPPED | OUTPATIENT
Start: 2020-12-18 | End: 2021-02-10

## 2020-12-18 NOTE — TELEPHONE ENCOUNTER
Please notify pt that her Effexor was approved for 30 days but no refills or further meds will be given until she is seen - missed f/u appt in Oct and needs to be seen or no further meds will be given    TY

## 2021-01-09 DIAGNOSIS — I10 ESSENTIAL HYPERTENSION: ICD-10-CM

## 2021-01-10 RX ORDER — BISOPROLOL FUMARATE 10 MG/1
TABLET ORAL
Qty: 30 TABLET | Refills: 1 | Status: SHIPPED | OUTPATIENT
Start: 2021-01-10 | End: 2021-03-11

## 2021-02-10 DIAGNOSIS — F41.1 GENERALIZED ANXIETY DISORDER: ICD-10-CM

## 2021-02-10 RX ORDER — VENLAFAXINE HYDROCHLORIDE 150 MG/1
CAPSULE, EXTENDED RELEASE ORAL
Qty: 10 CAPSULE | Refills: 0 | Status: SHIPPED | OUTPATIENT
Start: 2021-02-10 | End: 2021-03-21

## 2021-02-10 NOTE — TELEPHONE ENCOUNTER
Please notify pt that her Effexor was approved but only for 10 days - this is second notice no further meds till seen

## 2021-03-11 DIAGNOSIS — I10 ESSENTIAL HYPERTENSION: ICD-10-CM

## 2021-03-11 RX ORDER — BISOPROLOL FUMARATE 10 MG/1
TABLET ORAL
Qty: 10 TABLET | Refills: 0 | Status: SHIPPED | OUTPATIENT
Start: 2021-03-11 | End: 2021-03-26 | Stop reason: SDUPTHER

## 2021-03-11 NOTE — TELEPHONE ENCOUNTER
Please notify pt that this is the 3rd and final notice - 10 days of BP meds given - she never did her f/u appt as directed in Oct 2020 - no further meds after this AT ALL  - until seen

## 2021-03-12 DIAGNOSIS — E03.9 HYPOTHYROIDISM, UNSPECIFIED TYPE: ICD-10-CM

## 2021-03-12 RX ORDER — LEVOTHYROXINE SODIUM 0.05 MG/1
TABLET ORAL
Qty: 30 TABLET | Refills: 5 | Status: SHIPPED | OUTPATIENT
Start: 2021-03-12 | End: 2021-06-09 | Stop reason: SDUPTHER

## 2021-03-20 DIAGNOSIS — F41.1 GENERALIZED ANXIETY DISORDER: ICD-10-CM

## 2021-03-21 RX ORDER — VENLAFAXINE HYDROCHLORIDE 150 MG/1
CAPSULE, EXTENDED RELEASE ORAL
Qty: 10 CAPSULE | Refills: 0 | Status: SHIPPED | OUTPATIENT
Start: 2021-03-21 | End: 2021-03-26 | Stop reason: SDUPTHER

## 2021-03-26 ENCOUNTER — OFFICE VISIT (OUTPATIENT)
Dept: FAMILY MEDICINE CLINIC | Facility: HOSPITAL | Age: 36
End: 2021-03-26
Payer: COMMERCIAL

## 2021-03-26 VITALS
SYSTOLIC BLOOD PRESSURE: 142 MMHG | TEMPERATURE: 96.5 F | BODY MASS INDEX: 46.28 KG/M2 | WEIGHT: 277.8 LBS | DIASTOLIC BLOOD PRESSURE: 94 MMHG | HEART RATE: 80 BPM | HEIGHT: 65 IN

## 2021-03-26 DIAGNOSIS — F41.1 GENERALIZED ANXIETY DISORDER: ICD-10-CM

## 2021-03-26 DIAGNOSIS — L67.8 ABNORMAL FACIAL HAIR: ICD-10-CM

## 2021-03-26 DIAGNOSIS — E66.01 MORBID OBESITY WITH BMI OF 45.0-49.9, ADULT (HCC): ICD-10-CM

## 2021-03-26 DIAGNOSIS — I10 ESSENTIAL HYPERTENSION: Primary | ICD-10-CM

## 2021-03-26 PROCEDURE — 3008F BODY MASS INDEX DOCD: CPT | Performed by: INTERNAL MEDICINE

## 2021-03-26 PROCEDURE — 1036F TOBACCO NON-USER: CPT | Performed by: INTERNAL MEDICINE

## 2021-03-26 PROCEDURE — 99214 OFFICE O/P EST MOD 30 MIN: CPT | Performed by: INTERNAL MEDICINE

## 2021-03-26 PROCEDURE — 3077F SYST BP >= 140 MM HG: CPT | Performed by: INTERNAL MEDICINE

## 2021-03-26 PROCEDURE — 3080F DIAST BP >= 90 MM HG: CPT | Performed by: INTERNAL MEDICINE

## 2021-03-26 RX ORDER — VENLAFAXINE HYDROCHLORIDE 150 MG/1
150 CAPSULE, EXTENDED RELEASE ORAL DAILY
Qty: 30 CAPSULE | Refills: 5 | Status: SHIPPED | OUTPATIENT
Start: 2021-03-26 | End: 2021-06-09 | Stop reason: SDUPTHER

## 2021-03-26 RX ORDER — BISOPROLOL FUMARATE 10 MG/1
15 TABLET ORAL DAILY
Qty: 45 TABLET | Refills: 1 | Status: SHIPPED | OUTPATIENT
Start: 2021-03-26 | End: 2021-05-21

## 2021-03-26 NOTE — ASSESSMENT & PLAN NOTE
BP improved but not at goal, increase Bisoprolol from 10 mg to 15 mg daily, diet/exercise/wgt loss encouraged

## 2021-03-26 NOTE — PATIENT INSTRUCTIONS
Obesity   AMBULATORY CARE:   Obesity  is when your body mass index (BMI) is greater than 30  Your healthcare provider will use your height and weight to measure your BMI  The risks of obesity include  many health problems, such as injuries or physical disability  You may need tests to check for the following:  · Diabetes    · High blood pressure or high cholesterol    · Heart disease    · Gallbladder or liver disease    · Cancer of the colon, breast, prostate, liver, or kidney    · Sleep apnea    · Arthritis or gout    Seek care immediately if:   · You have a severe headache, confusion, or difficulty speaking  · You have weakness on one side of your body  · You have chest pain, sweating, or shortness of breath  Contact your healthcare provider if:   · You have symptoms of gallbladder or liver disease, such as pain in your upper abdomen  · You have knee or hip pain and discomfort while walking  · You have symptoms of diabetes, such as intense hunger and thirst, and frequent urination  · You have symptoms of sleep apnea, such as snoring or daytime sleepiness  · You have questions or concerns about your condition or care  Treatment for obesity  focuses on helping you lose weight to improve your health  Even a small decrease in BMI can reduce the risk for many health problems  Your healthcare provider will help you set a weight-loss goal   · Lifestyle changes  are the first step in treating obesity  These include making healthy food choices and getting regular physical activity  Your healthcare provider may suggest a weight-loss program that involves coaching, education, and therapy  · Medicine  may help you lose weight when it is used with a healthy diet and physical activity  · Surgery  can help you lose weight if you are very obese and have other health problems  There are several types of weight-loss surgery  Ask your healthcare provider for more information      Be successful losing weight:   · Set small, realistic goals  An example of a small goal is to walk for 20 minutes 5 days a week  Anther goal is to lose 5% of your body weight  · Tell friends, family members, and coworkers about your goals  and ask for their support  Ask a friend to lose weight with you, or join a weight-loss support group  · Identify foods or triggers that may cause you to overeat , and find ways to avoid them  Remove tempting high-calorie foods from your home and workplace  Place a bowl of fresh fruit on your kitchen counter  If stress causes you to eat, then find other ways to cope with stress  · Keep a diary to track what you eat and drink  Also write down how many minutes of physical activity you do each day  Weigh yourself once a week and record it in your diary  Eating changes: You will need to eat 500 to 1,000 fewer calories each day than you currently eat to lose 1 to 2 pounds a week  The following changes will help you cut calories:  · Eat smaller portions  Use small plates, no larger than 9 inches in diameter  Fill your plate half full of fruits and vegetables  Measure your food using measuring cups until you know what a serving size looks like  · Eat 3 meals and 1 or 2 snacks each day  Plan your meals in advance  [employer] and eat at home most of the time  Eat slowly  Do not skip meals  Skipping meals can lead to overeating later in the day  This can make it harder for you to lose weight  Talk with a dietitian to help you make a meal plan and schedule that is right for you  · Eat fruits and vegetables at every meal   They are low in calories and high in fiber, which makes you feel full  Do not add butter, margarine, or cream sauce to vegetables  Use herbs to season steamed vegetables  · Eat less fat and fewer fried foods  Eat more baked or grilled chicken and fish  These protein sources are lower in calories and fat than red meat  Limit fast food   Dress your salads with olive oil and vinegar instead of bottled dressing  · Limit the amount of sugar you eat  Do not drink sugary beverages  Limit alcohol  Activity changes:  Physical activity is good for your body in many ways  It helps you burn calories and build strong muscles  It decreases stress and depression, and improves your mood  It can also help you sleep better  Talk to your healthcare provider before you begin an exercise program   · Exercise for at least 30 minutes 5 days a week  Start slowly  Set aside time each day for physical activity that you enjoy and that is convenient for you  It is best to do both weight training and an activity that increases your heart rate, such as walking, bicycling, or swimming  · Find ways to be more active  Do yard work and housecleaning  Walk up the stairs instead of using elevators  Spend your leisure time going to events that require walking, such as outdoor festivals or fairs  This extra physical activity can help you lose weight and keep it off  Follow up with your healthcare provider as directed: You may need to meet with a dietitian  Write down your questions so you remember to ask them during your visits  © Copyright [address] [place] [address] Drive Information is for End User's use only and may not be sold, redistributed or otherwise used for commercial purposes  All illustrations and images included in CareNotes® are the copyrighted property of A D A M , [employer]  or [address] [place] [address]  The above information is an  only  It is not intended as medical advice for individual conditions or treatments  Talk to your doctor, nurse or pharmacist before following any medical regimen to see if it is safe and effective for you  Heart Healthy Diet   AMBULATORY CARE:   A heart healthy diet  is an eating plan low in unhealthy fats and sodium (salt)  The plan is high in healthy fats and fiber   A heart healthy diet helps improve your cholesterol levels and lowers your risk for heart disease and stroke  A dietitian will teach you how to read and understand food labels  Heart healthy diet guidelines to follow:   · Choose foods that contain healthy fats  ? Unsaturated fats  include monounsaturated and polyunsaturated fats  Unsaturated fat is found in foods such as soybean, canola, olive, corn, and safflower oils  It is also found in soft tub margarine that is made with liquid vegetable oil  ? Omega-3 fat  is found in certain fish, such as salmon, tuna, and trout, and in walnuts and flaxseed  Eat fish high in omega-3 fats at least 2 times a week  · Get 20 to 30 grams of fiber each day  Fruits, vegetables, whole-grain foods, and legumes (cooked beans) are good sources of fiber  · Limit or do not have unhealthy fats  ? Cholesterol  is found in animal foods, such as eggs and lobster, and in dairy products made from whole milk  Limit cholesterol to less than 200 mg each day  ? Saturated fat  is found in meats, such as middleton and hamburger  It is also found in chicken or turkey skin, whole milk, and butter  Limit saturated fat to less than 7% of your total daily calories  ? Trans fat  is found in packaged foods, such as potato chips and cookies  It is also in hard margarine, some fried foods, and shortening  Do not eat foods that contain trans fats  · Limit sodium as directed  You may be told to limit sodium to 2,000 to 2,300 mg each day  Choose low-sodium or no-salt-added foods  Add little or no salt to food you prepare  Use herbs and spices in place of salt  Include the following in your heart healthy plan:  Ask your dietitian or healthcare provider how many servings to have from each of the following food groups:  · Grains:      ? Whole-wheat breads, cereals, and pastas, and brown rice    ? Low-fat, low-sodium crackers and chips    · Vegetables:      ? Broccoli, green beans, green peas, and spinach    ? Collards, kale, and lima beans    ?  Carrots, sweet potatoes, tomatoes, and peppers    ? Canned vegetables with no salt added    · Fruits:      ? Bananas, peaches, pears, and pineapple    ? Grapes, raisins, and dates    ? Oranges, tangerines, grapefruit, orange juice, and grapefruit juice    ? Apricots, mangoes, melons, and papaya    ? Raspberries and strawberries    ? Canned fruit with no added sugar    · Low-fat dairy:      ? Nonfat (skim) milk, 1% milk, and low-fat almond, cashew, or soy milks fortified with calcium    ? Low-fat cheese, regular or frozen yogurt, and cottage cheese    · Meats and proteins:      ? Lean cuts of beef and pork (loin, leg, round), skinless chicken and turkey    ? Legumes, soy products, egg whites, or nuts    Limit or do not include the following in your heart healthy plan:   · Unhealthy fats and oils:      ? Whole or 2% milk, cream cheese, sour cream, or cheese    ? High-fat cuts of beef (T-bone steaks, ribs), chicken or turkey with skin, and organ meats such as liver    ? Butter, stick margarine, shortening, and cooking oils such as coconut or palm oil    · Foods and liquids high in sodium:      ? Packaged foods, such as frozen dinners, cookies, macaroni and cheese, and cereals with more than 300 mg of sodium per serving    ? Vegetables with added sodium, such as instant potatoes, vegetables with added sauces, or regular canned vegetables    ? Cured or smoked meats, such as hot dogs, middleton, and sausage    ? High-sodium ketchup, barbecue sauce, salad dressing, pickles, olives, soy sauce, or miso    · Foods and liquids high in sugar:      ? Candy, cake, cookies, pies, or doughnuts    ? Soft drinks (soda), sports drinks, or sweetened tea    ? Canned or dry mixes for cakes, soups, sauces, or gravies    Other healthy heart guidelines:   · Do not smoke  Nicotine and other chemicals in cigarettes and cigars can cause lung and heart damage  Ask your healthcare provider for information if you currently smoke and need help to quit   E-cigarettes or smokeless tobacco still contain nicotine  Talk to your healthcare provider before you use these products  · Limit or do not drink alcohol as directed  Alcohol can damage your heart and raise your blood pressure  Your healthcare provider may give you specific daily and weekly limits  The general recommended limit is 1 drink a day for women 21 or older and for men 72 or older  Do not have more than 3 drinks in a day or 7 in a week  The recommended limit is 2 drinks a day for men 24to 59years of age  Do not have more than 4 drinks in a day or 14 in a week  A drink of alcohol is 12 ounces of beer, 5 ounces of wine, or 1½ ounces of liquor  · Exercise regularly  Exercise can help you maintain a healthy weight and improve your blood pressure and cholesterol levels  Regular exercise can also decrease your risk for heart problems  Ask your healthcare provider about the best exercise plan for you  Do not start an exercise program without asking your healthcare provider  Follow up with your doctor or cardiologist as directed:  Write down your questions so you remember to ask them during your visits  © Copyright [address] Information is for End User's use only and may not be sold, redistributed or otherwise used for commercial purposes  All illustrations and images included in CareNotes® are the copyrighted property of A D A M , Inc  or [address] [place] [address]  The above information is an  only  It is not intended as medical advice for individual conditions or treatments  Talk to your doctor, nurse or pharmacist before following any medical regimen to see if it is safe and effective for you

## 2021-03-26 NOTE — PROGRESS NOTES
Assessment/Plan:    Essential hypertension  BP improved but not at goal, increase Bisoprolol from 10 mg to 15 mg daily, diet/exercise/wgt loss encouraged    Generalized anxiety disorder  Still with anxiety but better controlled/less severe and less frequent, uses Clonazepam prn, call with new/worse symptoms       Diagnoses and all orders for this visit:    Essential hypertension  -     bisoprolol (ZEBETA) 10 MG tablet; Take 1 5 tablets (15 mg total) by mouth daily    Generalized anxiety disorder  -     venlafaxine (EFFEXOR-XR) 150 mg 24 hr capsule; Take 1 capsule (150 mg total) by mouth daily    Morbid obesity with BMI of 45 0-49 9, adult ([facility] [address] )  Comments:  Diet/exercise/wgt loss encouraged    Abnormal facial hair  Comments:  Urged again to do her BW - order reprinted and given to pt, will follow       PAP - have not yet obtained copy - had PAP with last pregnancy - dgtr not even one   Saw Gloria French - Women's Specialist at [facility] 9/20       Subjective:      Patient ID: Brandy Lagunas is a 31 y o  female  HPI Pt here for med check - was noncompliant with f/u and told no further meds till appt made    Last visit with pt in Oct we noted con't elevated BP despite increase in her Bisoprolol  We suspected a large white coat anxiety component and advised pt to start checking BP at home and to bring home cuff and numbers to appt  She was not compliant for f/u  She has been checking BP at home but only has 4 readings - ranging from 130/91 to 161/98 with an average of upper 130's to low 140's/90's  Last visit we also ordered BW/hormone levels for her abnormal facial hair/elevated LFT's/irregular menses and concern for PCOS  Pt was not adherent with the BW  She con't to take her Effexor daily as directed for anxiety  She is happy with current regimen and does not feel the med needs to be increased  She notes no significant down/sad mood  She notes still feeling anxious but states it is better  She has Clonazepam as needed - primarily when driving distances or going to new places or doc appt  She notes no SE with the medications  BMI reviewed  She has lost 10 lbs since Oct 2020  She has started doing the [facility] a few times a week  She admits diet is good and bad  He may     PAP - have not yet obtained copy - had PAP with last pregnancy - dgtr not even one   Saw Anne Pedroza - Women's Specialist at [address] [place] 9/20     Review of Systems   Constitutional: Negative for chills, fatigue, fever and unexpected weight change  HENT: Negative for congestion and sore throat  Eyes: Negative for pain and visual disturbance  Respiratory: Negative for cough and wheezing  Cardiovascular: Negative for leg swelling  Gastrointestinal: Negative for abdominal pain, constipation, diarrhea, nausea and vomiting  Endocrine: Negative for polydipsia and polyuria  Genitourinary: Negative for difficulty urinating and dysuria  Musculoskeletal: Negative for back pain and neck pain  Skin: Negative for rash and wound  Neurological: Negative for dizziness and light-headedness  Hematological: Negative for adenopathy  Psychiatric/Behavioral: Negative for behavioral problems, confusion and dysphoric mood  The patient is nervous/anxious  Objective:    /94   Pulse 80   Temp (!) 96 5 °F (35 8 °C) (Temporal)   Ht 5' 4 5" (1 638 m)   Wt 126 kg (277 lb 12 8 oz)   BMI 46 95 kg/m²      Physical Exam  Vitals signs and nursing note reviewed  Constitutional:       General: She is not in acute distress  Appearance: She is well-developed  She is not ill-appearing  HENT:      Head: Normocephalic and atraumatic  Eyes:      General:         Right eye: No discharge  Left eye: No discharge  Conjunctiva/sclera: Conjunctivae normal    Neck:      Musculoskeletal: Neck supple  Trachea: No tracheal deviation     Cardiovascular:      Rate and Rhythm: Normal rate and regular rhythm  Heart sounds: Normal heart sounds  No murmur  No friction rub  Pulmonary:      Effort: Pulmonary effort is normal  No respiratory distress  Breath sounds: Normal breath sounds  No wheezing, rhonchi or rales  Abdominal:      General: There is no distension  Palpations: Abdomen is soft  Tenderness: There is no abdominal tenderness  There is no guarding or rebound  Musculoskeletal:      Right lower leg: No edema  Left lower leg: No edema  Skin:     General: Skin is warm  Coloration: Skin is not pale  Findings: No rash  Neurological:      General: No focal deficit present  Mental Status: She is alert  Mental status is at baseline  Motor: No abnormal muscle tone  Psychiatric:         Behavior: Behavior normal          Thought Content: Thought content normal          Judgment: Judgment normal       Comments: anxious         BMI Counseling: Body mass index is 46 95 kg/m²  The BMI is above normal  Nutrition recommendations include reducing portion sizes, 3-5 servings of fruits/vegetables daily, consuming healthier snacks, moderation in carbohydrate intake, increasing intake of lean protein and reducing intake of saturated fat and trans fat  Exercise recommendations include exercising 3-5 times per week

## 2021-03-26 NOTE — ASSESSMENT & PLAN NOTE
Still with anxiety but better controlled/less severe and less frequent, uses Clonazepam prn, call with new/worse symptoms

## 2021-03-29 DIAGNOSIS — Z23 ENCOUNTER FOR IMMUNIZATION: ICD-10-CM

## 2021-04-05 ENCOUNTER — IMMUNIZATIONS (OUTPATIENT)
Dept: FAMILY MEDICINE CLINIC | Facility: HOSPITAL | Age: 36
End: 2021-04-05

## 2021-04-05 DIAGNOSIS — Z23 ENCOUNTER FOR IMMUNIZATION: Primary | ICD-10-CM

## 2021-04-05 PROCEDURE — 91300 SARS-COV-2 / COVID-19 MRNA VACCINE (PFIZER-BIONTECH) 30 MCG: CPT

## 2021-04-05 PROCEDURE — 0001A SARS-COV-2 / COVID-19 MRNA VACCINE (PFIZER-BIONTECH) 30 MCG: CPT

## 2021-04-30 ENCOUNTER — IMMUNIZATIONS (OUTPATIENT)
Dept: FAMILY MEDICINE CLINIC | Facility: HOSPITAL | Age: 36
End: 2021-04-30

## 2021-04-30 DIAGNOSIS — Z23 ENCOUNTER FOR IMMUNIZATION: Primary | ICD-10-CM

## 2021-04-30 PROCEDURE — 91300 SARS-COV-2 / COVID-19 MRNA VACCINE (PFIZER-BIONTECH) 30 MCG: CPT

## 2021-04-30 PROCEDURE — 0002A SARS-COV-2 / COVID-19 MRNA VACCINE (PFIZER-BIONTECH) 30 MCG: CPT

## 2021-05-21 DIAGNOSIS — I10 ESSENTIAL HYPERTENSION: ICD-10-CM

## 2021-05-21 RX ORDER — BISOPROLOL FUMARATE 10 MG/1
TABLET ORAL
Qty: 45 TABLET | Refills: 0 | Status: SHIPPED | OUTPATIENT
Start: 2021-05-21 | End: 2021-06-09 | Stop reason: SDUPTHER

## 2021-05-21 NOTE — TELEPHONE ENCOUNTER
Please notify pt that her BP med was approved but no refills were given and no further meds will be given until she is seen - she did not show for her appt earlier this month for BP check after recent adjustments to BP meds were made - please schedule appt within the next 30 days to con't receiving medication   TY

## 2021-06-09 DIAGNOSIS — I10 ESSENTIAL HYPERTENSION: ICD-10-CM

## 2021-06-09 DIAGNOSIS — F41.1 GENERALIZED ANXIETY DISORDER: ICD-10-CM

## 2021-06-09 DIAGNOSIS — E03.9 HYPOTHYROIDISM, UNSPECIFIED TYPE: ICD-10-CM

## 2021-06-10 RX ORDER — LEVOTHYROXINE SODIUM 0.05 MG/1
50 TABLET ORAL DAILY
Qty: 30 TABLET | Refills: 5 | Status: SHIPPED | OUTPATIENT
Start: 2021-06-10

## 2021-06-10 RX ORDER — BISOPROLOL FUMARATE 10 MG/1
TABLET ORAL
Qty: 45 TABLET | Refills: 5 | Status: SHIPPED | OUTPATIENT
Start: 2021-06-10 | End: 2022-08-01 | Stop reason: SDUPTHER

## 2021-06-10 RX ORDER — VENLAFAXINE HYDROCHLORIDE 150 MG/1
150 CAPSULE, EXTENDED RELEASE ORAL DAILY
Qty: 30 CAPSULE | Refills: 5 | Status: SHIPPED | OUTPATIENT
Start: 2021-06-10

## 2021-06-10 RX ORDER — CLONAZEPAM 0.5 MG/1
0.5 TABLET ORAL 2 TIMES DAILY PRN
Qty: 60 TABLET | Refills: 0 | Status: SHIPPED | OUTPATIENT
Start: 2021-06-10 | End: 2022-04-28

## 2021-07-02 ENCOUNTER — APPOINTMENT (EMERGENCY)
Dept: CT IMAGING | Facility: HOSPITAL | Age: 36
End: 2021-07-02
Payer: COMMERCIAL

## 2021-07-02 ENCOUNTER — HOSPITAL ENCOUNTER (EMERGENCY)
Facility: HOSPITAL | Age: 36
Discharge: HOME | End: 2021-07-02
Attending: EMERGENCY MEDICINE | Admitting: EMERGENCY MEDICINE
Payer: COMMERCIAL

## 2021-07-02 VITALS
SYSTOLIC BLOOD PRESSURE: 93 MMHG | RESPIRATION RATE: 18 BRPM | TEMPERATURE: 97.9 F | OXYGEN SATURATION: 98 % | HEART RATE: 88 BPM | DIASTOLIC BLOOD PRESSURE: 55 MMHG

## 2021-07-02 DIAGNOSIS — R74.01 TRANSAMINITIS: ICD-10-CM

## 2021-07-02 DIAGNOSIS — R11.2 NAUSEA AND VOMITING: Primary | ICD-10-CM

## 2021-07-02 DIAGNOSIS — F12.10 CANNABIS ABUSE: ICD-10-CM

## 2021-07-02 DIAGNOSIS — K76.0 HEPATIC STEATOSIS: ICD-10-CM

## 2021-07-02 DIAGNOSIS — F10.10 ALCOHOL ABUSE: ICD-10-CM

## 2021-07-02 LAB
ALBUMIN SERPL BCP-MCNC: 4.2 G/DL (ref 3.5–5)
ALP SERPL-CCNC: 86 U/L (ref 46–116)
ALT SERPL W P-5'-P-CCNC: 106 U/L (ref 12–78)
ANION GAP SERPL CALCULATED.3IONS-SCNC: 16 MMOL/L (ref 4–13)
AST SERPL W P-5'-P-CCNC: 203 U/L (ref 5–45)
ATRIAL RATE: 83 BPM
BACTERIA UR QL AUTO: NORMAL /HPF
BASE EXCESS BLDA CALC-SCNC: -3 MMOL/L (ref -2–3)
BASOPHILS # BLD AUTO: 0.09 THOUSANDS/ΜL (ref 0–0.1)
BASOPHILS NFR BLD AUTO: 1 % (ref 0–1)
BILIRUB SERPL-MCNC: 0.8 MG/DL (ref 0.2–1)
BILIRUB UR QL STRIP: NEGATIVE
BUN SERPL-MCNC: 10 MG/DL (ref 5–25)
CA-I BLD-SCNC: 0.98 MMOL/L (ref 1.12–1.32)
CALCIUM SERPL-MCNC: 9 MG/DL (ref 8.3–10.1)
CHLORIDE SERPL-SCNC: 97 MMOL/L (ref 100–108)
CLARITY UR: CLEAR
CO2 SERPL-SCNC: 24 MMOL/L (ref 21–32)
COLOR UR: YELLOW
CREAT SERPL-MCNC: 0.9 MG/DL (ref 0.6–1.3)
EOSINOPHIL # BLD AUTO: 0.01 THOUSAND/ΜL (ref 0–0.61)
EOSINOPHIL NFR BLD AUTO: 0 % (ref 0–6)
ERYTHROCYTE [DISTWIDTH] IN BLOOD BY AUTOMATED COUNT: 16.1 % (ref 11.6–15.1)
EXT PREG TEST URINE: NEGATIVE
EXT. CONTROL ED NAV: NORMAL
GFR SERPL CREATININE-BSD FRML MDRD: 84 ML/MIN/1.73SQ M
GLUCOSE SERPL-MCNC: 74 MG/DL (ref 65–140)
GLUCOSE SERPL-MCNC: 76 MG/DL (ref 65–140)
GLUCOSE UR STRIP-MCNC: NEGATIVE MG/DL
HCO3 BLDA-SCNC: 23.3 MMOL/L (ref 24–30)
HCT VFR BLD AUTO: 47.7 % (ref 34.8–46.1)
HCT VFR BLD CALC: 47 % (ref 34.8–46.1)
HGB BLD-MCNC: 16.3 G/DL (ref 11.5–15.4)
HGB BLDA-MCNC: 16 G/DL (ref 11.5–15.4)
HGB UR QL STRIP.AUTO: ABNORMAL
IMM GRANULOCYTES # BLD AUTO: 0.02 THOUSAND/UL (ref 0–0.2)
IMM GRANULOCYTES NFR BLD AUTO: 0 % (ref 0–2)
KETONES UR STRIP-MCNC: ABNORMAL MG/DL
LEUKOCYTE ESTERASE UR QL STRIP: NEGATIVE
LIPASE SERPL-CCNC: 219 U/L (ref 73–393)
LYMPHOCYTES # BLD AUTO: 2.26 THOUSANDS/ΜL (ref 0.6–4.47)
LYMPHOCYTES NFR BLD AUTO: 33 % (ref 14–44)
MCH RBC QN AUTO: 33.5 PG (ref 26.8–34.3)
MCHC RBC AUTO-ENTMCNC: 34.2 G/DL (ref 31.4–37.4)
MCV RBC AUTO: 98 FL (ref 82–98)
MONOCYTES # BLD AUTO: 0.9 THOUSAND/ΜL (ref 0.17–1.22)
MONOCYTES NFR BLD AUTO: 13 % (ref 4–12)
NEUTROPHILS # BLD AUTO: 3.58 THOUSANDS/ΜL (ref 1.85–7.62)
NEUTS SEG NFR BLD AUTO: 53 % (ref 43–75)
NITRITE UR QL STRIP: NEGATIVE
NON-SQ EPI CELLS URNS QL MICRO: NORMAL /HPF
NRBC BLD AUTO-RTO: 0 /100 WBCS
P AXIS: 59 DEGREES
PCO2 BLD: 25 MMOL/L (ref 21–32)
PCO2 BLD: 46 MM HG (ref 42–50)
PH BLD: 7.31 [PH] (ref 7.3–7.4)
PH UR STRIP.AUTO: 6 [PH]
PLATELET # BLD AUTO: 141 THOUSANDS/UL (ref 149–390)
PMV BLD AUTO: 10.2 FL (ref 8.9–12.7)
PO2 BLD: 32 MM HG (ref 35–45)
POTASSIUM BLD-SCNC: 4.1 MMOL/L (ref 3.5–5.3)
POTASSIUM SERPL-SCNC: 4.1 MMOL/L (ref 3.5–5.3)
PR INTERVAL: 152 MS
PROT SERPL-MCNC: 8.4 G/DL (ref 6.4–8.2)
PROT UR STRIP-MCNC: ABNORMAL MG/DL
QRS AXIS: 69 DEGREES
QRSD INTERVAL: 86 MS
QT INTERVAL: 390 MS
QTC INTERVAL: 458 MS
RBC # BLD AUTO: 4.87 MILLION/UL (ref 3.81–5.12)
RBC #/AREA URNS AUTO: NORMAL /HPF
SAO2 % BLD FROM PO2: 55 % (ref 60–85)
SODIUM BLD-SCNC: 136 MMOL/L (ref 136–145)
SODIUM SERPL-SCNC: 137 MMOL/L (ref 136–145)
SP GR UR STRIP.AUTO: 1.01 (ref 1–1.03)
SPECIMEN SOURCE: ABNORMAL
T WAVE AXIS: 31 DEGREES
TROPONIN I SERPL-MCNC: <0.02 NG/ML
UROBILINOGEN UR QL STRIP.AUTO: 1 E.U./DL
VENTRICULAR RATE: 83 BPM
WBC # BLD AUTO: 6.86 THOUSAND/UL (ref 4.31–10.16)
WBC #/AREA URNS AUTO: NORMAL /HPF

## 2021-07-02 PROCEDURE — 80053 COMPREHEN METABOLIC PANEL: CPT | Performed by: EMERGENCY MEDICINE

## 2021-07-02 PROCEDURE — 82330 ASSAY OF CALCIUM: CPT

## 2021-07-02 PROCEDURE — 83690 ASSAY OF LIPASE: CPT | Performed by: EMERGENCY MEDICINE

## 2021-07-02 PROCEDURE — 85025 COMPLETE CBC W/AUTO DIFF WBC: CPT | Performed by: EMERGENCY MEDICINE

## 2021-07-02 PROCEDURE — 84132 ASSAY OF SERUM POTASSIUM: CPT

## 2021-07-02 PROCEDURE — 84295 ASSAY OF SERUM SODIUM: CPT

## 2021-07-02 PROCEDURE — 81025 URINE PREGNANCY TEST: CPT | Performed by: EMERGENCY MEDICINE

## 2021-07-02 PROCEDURE — 99284 EMERGENCY DEPT VISIT MOD MDM: CPT | Performed by: EMERGENCY MEDICINE

## 2021-07-02 PROCEDURE — 96361 HYDRATE IV INFUSION ADD-ON: CPT

## 2021-07-02 PROCEDURE — 74176 CT ABD & PELVIS W/O CONTRAST: CPT

## 2021-07-02 PROCEDURE — 82947 ASSAY GLUCOSE BLOOD QUANT: CPT

## 2021-07-02 PROCEDURE — 93010 ELECTROCARDIOGRAM REPORT: CPT | Performed by: INTERNAL MEDICINE

## 2021-07-02 PROCEDURE — 99284 EMERGENCY DEPT VISIT MOD MDM: CPT

## 2021-07-02 PROCEDURE — 82803 BLOOD GASES ANY COMBINATION: CPT

## 2021-07-02 PROCEDURE — 36415 COLL VENOUS BLD VENIPUNCTURE: CPT | Performed by: EMERGENCY MEDICINE

## 2021-07-02 PROCEDURE — 81001 URINALYSIS AUTO W/SCOPE: CPT | Performed by: EMERGENCY MEDICINE

## 2021-07-02 PROCEDURE — 84484 ASSAY OF TROPONIN QUANT: CPT | Performed by: EMERGENCY MEDICINE

## 2021-07-02 PROCEDURE — 96374 THER/PROPH/DIAG INJ IV PUSH: CPT

## 2021-07-02 PROCEDURE — 93005 ELECTROCARDIOGRAM TRACING: CPT

## 2021-07-02 PROCEDURE — 85014 HEMATOCRIT: CPT

## 2021-07-02 RX ORDER — ONDANSETRON 4 MG/1
4 TABLET, ORALLY DISINTEGRATING ORAL EVERY 6 HOURS PRN
Qty: 12 TABLET | Refills: 0 | Status: SHIPPED | OUTPATIENT
Start: 2021-07-02 | End: 2022-04-28

## 2021-07-02 RX ORDER — ONDANSETRON 2 MG/ML
4 INJECTION INTRAMUSCULAR; INTRAVENOUS ONCE
Status: COMPLETED | OUTPATIENT
Start: 2021-07-02 | End: 2021-07-02

## 2021-07-02 RX ADMIN — SODIUM CHLORIDE 1000 ML: 0.9 INJECTION, SOLUTION INTRAVENOUS at 20:54

## 2021-07-02 RX ADMIN — ONDANSETRON 4 MG: 2 INJECTION INTRAMUSCULAR; INTRAVENOUS at 20:54

## 2021-07-03 NOTE — ED NOTES
Letha Izquierdo from [employer] that they are going to work on out pt treatment       Indra Short, RN  07/02/21 [id number]

## 2021-07-04 NOTE — ED PROVIDER NOTES
History  Chief Complaint   Patient presents with    Vomiting     vomiting for about a week with 2 episodes of blood over the past two days  28-year-old female presents for evaluation of epigastric abdominal pain, nausea, vomiting that started 2 days ago  Patient reports she has been having chronic issues with similar symptoms for the last few months  Patient admits to drinking a lot of alcohol" on a daily basis  Denies chest pain, shortness of breath  Pain is worse with palpation  No specific alleviating or aggravating factors  Vomiting  Associated symptoms: abdominal pain    Associated symptoms: no fever        Prior to Admission Medications   Prescriptions Last Dose Informant Patient Reported? Taking? Multiple Vitamins-Minerals (MULTIVITAMIN ADULT PO)  Self Yes No   Sig: Take 1 tablet by mouth daily   bisoprolol (ZEBETA) 10 MG tablet   No No   Si and 1/2 tab PO q day   clonazePAM (KlonoPIN) 0 5 mg tablet   No No   Sig: Take 1 tablet (0 5 mg total) by mouth 2 (two) times a day as needed for anxiety   fluticasone (FLONASE) 50 mcg/act nasal spray   Yes No   Si spray into each nostril daily   levothyroxine 50 mcg tablet   No No   Sig: Take 1 tablet (50 mcg total) by mouth daily   venlafaxine (EFFEXOR-XR) 150 mg 24 hr capsule   No No   Sig: Take 1 capsule (150 mg total) by mouth daily      Facility-Administered Medications: None       Past Medical History:   Diagnosis Date    Insulin controlled gestational diabetes mellitus (GDM) during pregnancy, antepartum 2019    Metformin dinner/HS insulin  Last Assessment & Plan:  BG log reviewed today:- previous review on Friday with recommendation for increasing HS insulin  Fastings: high normal 1 hour PP: within the desired range   Discussed rationale and recommendation to change current medical therapy as listed:   Bedtime: 18 units Levemir  Continue same dose of Metformin, 1000 mg at dinner    Denies signs and sympto    Obesity        Past Surgical History:   Procedure Laterality Date     SECTION  2018     SECTION  10/20/2017    DILATION AND CURETTAGE OF UTERUS      MASTOIDECTOMY  2013    MASTOIDECTOMY Right     WISDOM TOOTH EXTRACTION         Family History   Problem Relation Age of Onset    Multiple sclerosis Mother     Thyroid disease Mother     Diabetes Mother     Hypertension Mother     COPD Mother     Rectal cancer Father 47    Hyperlipidemia Father     Hypertension Father     Alcohol abuse Father     Depression Sister     Hypertension Sister      I have reviewed and agree with the history as documented  E-Cigarette/Vaping    E-Cigarette Use Never User      E-Cigarette/Vaping Substances    Nicotine No     THC No     CBD No     Flavoring No     Other No     Unknown No      Social History     Tobacco Use    Smoking status: Current Every Day Smoker     Packs/day: 0 25     Types: Cigarettes     Last attempt to quit: 2017     Years since quittin 5    Smokeless tobacco: Never Used   Vaping Use    Vaping Use: Never used   Substance Use Topics    Alcohol use: Yes    Drug use: Yes     Types: Marijuana       Review of Systems   Constitutional: Negative for fever  Gastrointestinal: Positive for abdominal pain, nausea and vomiting  All other systems reviewed and are negative  Physical Exam  Physical Exam  Vitals and nursing note reviewed  Constitutional:       Appearance: She is well-developed  HENT:      Head: Normocephalic and atraumatic  Right Ear: External ear normal       Left Ear: External ear normal       Nose: Nose normal    Eyes:      General: No scleral icterus  Cardiovascular:      Rate and Rhythm: Normal rate  Pulmonary:      Effort: Pulmonary effort is normal  No respiratory distress  Abdominal:      General: There is no distension  Tenderness: There is abdominal tenderness  Musculoskeletal:         General: No deformity  Normal range of motion  Potassium 4 1 mmol/L      Chloride 97 mmol/L      CO2 24 mmol/L      ANION GAP 16 mmol/L      BUN 10 mg/dL      Creatinine 0 90 mg/dL      Glucose 76 mg/dL      Calcium 9 0 mg/dL       U/L       U/L      Alkaline Phosphatase 86 U/L      Total Protein 8 4 g/dL      Albumin 4 2 g/dL      Total Bilirubin 0 80 mg/dL      eGFR 84 ml/min/1 73sq m     Narrative:      [place] guidelines for Chronic Kidney Disease (CKD):     Stage 1 with normal or high GFR (GFR > 90 mL/min/1 73 square meters)    Stage 2 Mild CKD (GFR = 60-89 mL/min/1 73 square meters)    Stage 3A Moderate CKD (GFR = 45-59 mL/min/1 73 square meters)    Stage 3B Moderate CKD (GFR = 30-44 mL/min/1 73 square meters)    Stage 4 Severe CKD (GFR = 15-29 mL/min/1 73 square meters)    Stage 5 End Stage CKD (GFR <15 mL/min/1 73 square meters)  Note: GFR calculation is accurate only with a steady state creatinine    Lipase [[id number]]  (Normal) Collected: 07/02/21 2052    Lab Status: Final result Specimen: Blood from Arm, Right Updated: 07/02/21 2125     Lipase 219 u/L     Urine Microscopic [[id number]]  (Normal) Collected: 07/02/21 2056    Lab Status: Final result Specimen: Urine, Clean Catch Updated: 07/02/21 2121     RBC, UA 0-1 /hpf      WBC, UA None Seen /hpf      Epithelial Cells Occasional /hpf      Bacteria, UA None Seen /hpf     UA w Reflex to Microscopic w Reflex to Culture [[id number]]  (Abnormal) Collected: 07/02/21 2056    Lab Status: Final result Specimen: Urine, Clean Catch Updated: 07/02/21 2111     Color, UA Yellow     Clarity, UA Clear     Specific Gravity, UA 1 015     pH, UA 6 0     Leukocytes, UA Negative     Nitrite, UA Negative     Protein,  (2+) mg/dl      Glucose, UA Negative mg/dl      Ketones, UA Trace mg/dl      Urobilinogen, UA 1 0 E U /dl      Bilirubin, UA Negative     Blood, UA Small    CBC and differential [[id number]]  (Abnormal) Collected: 07/02/21 2052    Lab Status: Final result Specimen: Blood from Arm, Right Updated: 07/02/21 2104     WBC 6 86 Thousand/uL      RBC 4 87 Million/uL      Hemoglobin 16 3 g/dL      Hematocrit 47 7 %      MCV 98 fL      MCH 33 5 pg      MCHC 34 2 g/dL      RDW 16 1 %      MPV 10 2 fL      Platelets 632 Thousands/uL      nRBC 0 /100 WBCs      Neutrophils Relative 53 %      Immat GRANS % 0 %      Lymphocytes Relative 33 %      Monocytes Relative 13 %      Eosinophils Relative 0 %      Basophils Relative 1 %      Neutrophils Absolute 3 58 Thousands/µL      Immature Grans Absolute 0 02 Thousand/uL      Lymphocytes Absolute 2 26 Thousands/µL      Monocytes Absolute 0 90 Thousand/µL      Eosinophils Absolute 0 01 Thousand/µL      Basophils Absolute 0 09 Thousands/µL     POCT pregnancy, urine [[id number]]  (Normal) Resulted: 07/02/21 2052    Lab Status: Final result Specimen: Urine Updated: 07/02/21 2052     EXT PREG TEST UR (Ref: Negative) negative     Control valid                 CT abdomen pelvis wo contrast   Final Result by Esvin Murray MD (07/02 2138)      Hepatic steatosis  Workstation performed: [id number]                    Procedures  Procedures         ED Course                             SBIRT 22yo+      Most Recent Value   SBIRT (24 yo +)   In order to provide better care to our patients, we are screening all of our patients for alcohol and drug use  Would it be okay to ask you these screening questions? Yes Filed at: 07/02/2021 2132   Initial Alcohol Screen: US AUDIT-C    1  How often do you have a drink containing alcohol?  0 Filed at: 07/02/2021 2132   2  How many drinks containing alcohol do you have on a typical day you are drinking? 0 Filed at: 07/02/2021 2132   3a  Male UNDER 65: How often do you have five or more drinks on one occasion? 0 Filed at: 07/02/2021 2132   3b  FEMALE Any Age, or MALE 65+: How often do you have 4 or more drinks on one occassion?   0 Filed at: 07/02/2021 2132   Audit-C Score  0 Filed at: 07/02/2021 2132 GOLDIE: How many times in the past year have you    Used an illegal drug or used a prescription medication for non-medical reasons? Never Filed at: 07/02/2021 2132                    MDM  Number of Diagnoses or Management Options  Alcohol abuse: new and requires workup  Cannabis abuse  Hepatic steatosis: new and requires workup  Nausea and vomiting: new and requires workup  Transaminitis: new and requires workup  Diagnosis management comments: 81-year-old female presenting with abdominal pain with associated excessive alcohol intake and marijuana abuse  Discussed drug and alcohol cessation  Obtain labs, CT abdomen pelvis and symptom control  Discussed with be cares    Discussed all results with patient and family member at bedside  Understood importance of alcohol and drug cessation    Will follow up with primary care provider       Amount and/or Complexity of Data Reviewed  Clinical lab tests: ordered and reviewed  Tests in the radiology section of CPT®: ordered and reviewed  Tests in the medicine section of CPT®: reviewed and ordered  Decide to obtain previous medical records or to obtain history from someone other than the patient: yes  Obtain history from someone other than the patient: yes  Review and summarize past medical records: yes        Disposition  Final diagnoses:   Nausea and vomiting   Alcohol abuse   Hepatic steatosis   Transaminitis   Cannabis abuse     Time reflects when diagnosis was documented in both MDM as applicable and the Disposition within this note     Time User Action Codes Description Comment    7/2/2021 10:24 PM Laura Serene Add [R11 2] Nausea and vomiting     7/2/2021 10:24 PM Laura Serene Add [F10 10] Alcohol abuse     7/2/2021 10:24 PM Laura Serene Add [K76 0] Hepatic steatosis     7/2/2021 10:24 PM Laura Serene Add [R74 01] Transaminitis     7/2/2021 10:24 PM Laura Serene Add [F12 10] Cannabis abuse       ED Disposition     ED Disposition Condition Date/Time Comment Discharge Stable Fri Jul 2, 2021 10:24 PM Johnnie Rivera discharge to home/self care  Follow-up Information     Follow up With Specialties Details Why Contact Info Additional Wicho Sin [address]96, DO Internal Medicine, Family Medicine   [facility]  [address] [facility]  [address] [phone]        [address] Emergency Department Emergency Medicine  If symptoms worsen [address] [place], [address]  [address] [facility] Emergency Department, [address] [place], [facility], [address]          Discharge Medication List as of 7/2/2021 10:25 PM      START taking these medications    Details   ondansetron (ZOFRAN-ODT) 4 mg disintegrating tablet Take 1 tablet (4 mg total) by mouth every 6 (six) hours as needed for nausea or vomiting, Starting Fri 7/2/2021, Print         CONTINUE these medications which have NOT CHANGED    Details   bisoprolol (ZEBETA) 10 MG tablet 1 and 1/2 tab PO q day, Normal      clonazePAM (KlonoPIN) 0 5 mg tablet Take 1 tablet (0 5 mg total) by mouth 2 (two) times a day as needed for anxiety, Starting Thu 6/10/2021, Normal      fluticasone (FLONASE) 50 mcg/act nasal spray 1 spray into each nostril daily, Historical Med      levothyroxine 50 mcg tablet Take 1 tablet (50 mcg total) by mouth daily, Starting Thu 6/10/2021, Normal      Multiple Vitamins-Minerals (MULTIVITAMIN ADULT PO) Take 1 tablet by mouth daily, Historical Med      venlafaxine (EFFEXOR-XR) 150 mg 24 hr capsule Take 1 capsule (150 mg total) by mouth daily, Starting Thu 6/10/2021, Normal           No discharge procedures on file      PDMP Review       Value Time User    PDMP Reviewed  Yes 6/10/2021 11:27 AM Francisco Rodriguez DO          ED Provider  Electronically Signed by           Leatha Ayala DO  07/04/21 6661

## 2022-02-03 ENCOUNTER — NURSE TRIAGE (OUTPATIENT)
Dept: OTHER | Facility: OTHER | Age: 37
End: 2022-02-03

## 2022-02-03 ENCOUNTER — HOSPITAL ENCOUNTER (EMERGENCY)
Facility: HOSPITAL | Age: 37
Discharge: HOME | End: 2022-02-03
Attending: EMERGENCY MEDICINE

## 2022-02-03 VITALS
RESPIRATION RATE: 20 BRPM | TEMPERATURE: 97.6 F | DIASTOLIC BLOOD PRESSURE: 92 MMHG | BODY MASS INDEX: 47.68 KG/M2 | HEIGHT: 64 IN | HEART RATE: 105 BPM | SYSTOLIC BLOOD PRESSURE: 157 MMHG | OXYGEN SATURATION: 97 %

## 2022-02-03 DIAGNOSIS — M79.662 PAIN OF LEFT CALF: Primary | ICD-10-CM

## 2022-02-03 DIAGNOSIS — F10.10 ALCOHOL ABUSE: ICD-10-CM

## 2022-02-03 LAB
ANION GAP SERPL CALCULATED.3IONS-SCNC: 12 MMOL/L (ref 4–13)
BUN SERPL-MCNC: 8 MG/DL (ref 5–25)
CALCIUM SERPL-MCNC: 8.7 MG/DL (ref 8.3–10.1)
CHLORIDE SERPL-SCNC: 101 MMOL/L (ref 100–108)
CO2 SERPL-SCNC: 24 MMOL/L (ref 21–32)
CREAT SERPL-MCNC: 0.67 MG/DL (ref 0.6–1.3)
D DIMER PPP FEU-MCNC: 0.5 UG/ML FEU
GFR SERPL CREATININE-BSD FRML MDRD: 115 ML/MIN/1.73SQ M
GLUCOSE SERPL-MCNC: 111 MG/DL (ref 65–140)
POTASSIUM SERPL-SCNC: 3.5 MMOL/L (ref 3.5–5.3)
SODIUM SERPL-SCNC: 137 MMOL/L (ref 136–145)

## 2022-02-03 PROCEDURE — 80048 BASIC METABOLIC PNL TOTAL CA: CPT | Performed by: EMERGENCY MEDICINE

## 2022-02-03 PROCEDURE — 99284 EMERGENCY DEPT VISIT MOD MDM: CPT

## 2022-02-03 PROCEDURE — 36415 COLL VENOUS BLD VENIPUNCTURE: CPT | Performed by: EMERGENCY MEDICINE

## 2022-02-03 PROCEDURE — 99282 EMERGENCY DEPT VISIT SF MDM: CPT | Performed by: EMERGENCY MEDICINE

## 2022-02-03 PROCEDURE — 85379 FIBRIN DEGRADATION QUANT: CPT | Performed by: EMERGENCY MEDICINE

## 2022-02-04 NOTE — ED PROVIDER NOTES
History  Chief Complaint   Patient presents with    Leg Swelling     Pt arrived to ED from home with c/o left lower leg swelling for 2 days  No recent travel, no DVT history, no pain but leg feels "tight"     24-year-old female presents for evaluation of left lower extremity swelling and a feeling of fullness that has been ongoing for the past 2 days  The patient describes constant symptoms  She states that her leg feels tight  She denies any recent trauma illness or injury  The patient denies any  The prolonged immobilization, recent travel  The patient denies oral contraceptive use  The patient is a smoker  The patient also admits to increasing alcohol use  Prior to Admission Medications   Prescriptions Last Dose Informant Patient Reported? Taking?    Multiple Vitamins-Minerals (MULTIVITAMIN ADULT PO)  Self Yes No   Sig: Take 1 tablet by mouth daily   bisoprolol (ZEBETA) 10 MG tablet   No No   Si and 1/2 tab PO q day   clonazePAM (KlonoPIN) 0 5 mg tablet   No No   Sig: Take 1 tablet (0 5 mg total) by mouth 2 (two) times a day as needed for anxiety   fluticasone (FLONASE) 50 mcg/act nasal spray   Yes No   Si spray into each nostril daily   levothyroxine 50 mcg tablet   No No   Sig: Take 1 tablet (50 mcg total) by mouth daily   ondansetron (ZOFRAN-ODT) 4 mg disintegrating tablet   No No   Sig: Take 1 tablet (4 mg total) by mouth every 6 (six) hours as needed for nausea or vomiting   venlafaxine (EFFEXOR-XR) 150 mg 24 hr capsule   No No   Sig: Take 1 capsule (150 mg total) by mouth daily      Facility-Administered Medications: None       Past Medical History:   Diagnosis Date    Insulin controlled gestational diabetes mellitus (GDM) during pregnancy, antepartum 2019    Metformin dinner/HS insulin  Last Assessment & Plan:  BG log reviewed today:- previous review on Friday with recommendation for increasing HS insulin  Fastings: high normal 1 hour PP: within the desired range   Discussed rationale and recommendation to change current medical therapy as listed:   Bedtime: 18 units Levemir  Continue same dose of Metformin, 1000 mg at dinner  Denies signs and sympto    Obesity        Past Surgical History:   Procedure Laterality Date     SECTION  2018     SECTION  10/20/2017    DILATION AND CURETTAGE OF UTERUS      MASTOIDECTOMY  2013    MASTOIDECTOMY Right     WISDOM TOOTH EXTRACTION         Family History   Problem Relation Age of Onset    Multiple sclerosis Mother     Thyroid disease Mother     Diabetes Mother     Hypertension Mother     COPD Mother     Rectal cancer Father 47    Hyperlipidemia Father     Hypertension Father     Alcohol abuse Father     Depression Sister     Hypertension Sister      I have reviewed and agree with the history as documented  E-Cigarette/Vaping    E-Cigarette Use Never User      E-Cigarette/Vaping Substances    Nicotine No     THC No     CBD No     Flavoring No     Other No     Unknown No      Social History     Tobacco Use    Smoking status: Current Every Day Smoker     Packs/day: 0 25     Types: Cigarettes     Last attempt to quit: 2017     Years since quittin 0    Smokeless tobacco: Never Used   Vaping Use    Vaping Use: Never used   Substance Use Topics    Alcohol use: Yes    Drug use: Yes     Types: Marijuana       Review of Systems   Respiratory: Negative for shortness of breath  Cardiovascular: Negative for chest pain and palpitations  Musculoskeletal: Positive for myalgias ( Left lower extremity)  Neurological: Negative for syncope  All other systems reviewed and are negative  Physical Exam  Physical Exam  Vitals and nursing note reviewed  Constitutional:       General: She is not in acute distress  Appearance: She is well-developed  She is obese  HENT:      Head: Normocephalic and atraumatic        Right Ear: External ear normal       Left Ear: External ear normal    Eyes: General: No scleral icterus  Conjunctiva/sclera: Conjunctivae normal       Pupils: Pupils are equal, round, and reactive to light  Cardiovascular:      Rate and Rhythm: Normal rate and regular rhythm  Heart sounds: Normal heart sounds  Pulmonary:      Effort: Pulmonary effort is normal  No respiratory distress  Breath sounds: Normal breath sounds  Abdominal:      General: Bowel sounds are normal       Palpations: Abdomen is soft  Tenderness: There is no abdominal tenderness  There is no guarding or rebound  Musculoskeletal:         General: Normal range of motion  Cervical back: Normal range of motion  Right lower leg: No edema  Left lower leg: No edema  Skin:     General: Skin is warm and dry  Findings: No rash  Neurological:      Mental Status: She is alert and oriented to person, place, and time           Vital Signs  ED Triage Vitals [02/03/22 2136]   Temperature Pulse Respirations Blood Pressure SpO2   97 6 °F (36 4 °C) (!) 120 20 (!) 159/114 95 %      Temp Source Heart Rate Source Patient Position - Orthostatic VS BP Location FiO2 (%)   Temporal Monitor Sitting Left arm --      Pain Score       No Pain           Vitals:    02/03/22 2136 02/03/22 2200 02/03/22 2300   BP: (!) 159/114 142/93 157/92   Pulse: (!) 120 (!) 111 105   Patient Position - Orthostatic VS: Sitting Lying Sitting         Visual Acuity      ED Medications  Medications - No data to display    Diagnostic Studies  Results Reviewed     Procedure Component Value Units Date/Time    D-Dimer [[id number]]  (Abnormal) Collected: 02/03/22 2200    Lab Status: Final result Specimen: Blood from Arm, Right Updated: 02/03/22 2304     D-Dimer, Quant 0 50 ug/ml FEU     Basic metabolic panel [[id number]] Collected: 02/03/22 2200    Lab Status: Final result Specimen: Blood from Hand, Right Updated: 02/03/22 2217     Sodium 137 mmol/L      Potassium 3 5 mmol/L      Chloride 101 mmol/L      CO2 24 mmol/L      ANION GAP 12 mmol/L      BUN 8 mg/dL      Creatinine 0 67 mg/dL      Glucose 111 mg/dL      Calcium 8 7 mg/dL      eGFR 115 ml/min/1 73sq m     Narrative:      [place] guidelines for Chronic Kidney Disease (CKD):     Stage 1 with normal or high GFR (GFR > 90 mL/min/1 73 square meters)    Stage 2 Mild CKD (GFR = 60-89 mL/min/1 73 square meters)    Stage 3A Moderate CKD (GFR = 45-59 mL/min/1 73 square meters)    Stage 3B Moderate CKD (GFR = 30-44 mL/min/1 73 square meters)    Stage 4 Severe CKD (GFR = 15-29 mL/min/1 73 square meters)    Stage 5 End Stage CKD (GFR <15 mL/min/1 73 square meters)  Note: GFR calculation is accurate only with a steady state creatinine                 VAS lower limb venous duplex study, unilateral/limited    (Results Pending)              Procedures  Procedures         ED Course                                     Clayton' Criteria for DVT      Most Recent Value   Clayton' Criteria for DVT    Active cancer Treatment or palliation within 6 months 0 Filed at: 02/03/2022 2333   Bedridden recently >3 days or major surgery within 12 weeks 0 Filed at: 02/03/2022 2333   Calf swelling >3 cm compared to the other leg 0 Filed at: 02/03/2022 2333   Entire leg swollen 0 Filed at: 02/03/2022 2333   Collateral (nonvaricose) superficial veins present 0 Filed at: 02/03/2022 2333   Localized tenderness along the deep venous system 0 Filed at: 02/03/2022 2333   Pitting edema, confined to symptomatic leg 0 Filed at: 02/03/2022 2333   Paralysis, paresis, or recent plaster immobilization of the lower extremity 0 Filed at: 02/03/2022 2333   Previously documented DVT 0 Filed at: 02/03/2022 2333   Alternative diagnosis to DVT as likely or more likely 0 Filed at: 02/03/2022 2333   Clayton DVT Critera Score 0 Filed at: 02/03/2022 2333              MDM  Number of Diagnoses or Management Options  Alcohol abuse: new and requires workup  Pain of left calf: new and requires workup  Diagnosis management comments: Plan is check D-dimer electrolytes  Diagnostics venous thromboembolic disease reviewed  I note the slightly elevated D-dimer  I discussed the plan for ambulatory vascular study in the morning  Patient was connected with [facility] [place] for her alcohol abuse and outpatient resources    Plan for discharge    The patient (and any family present) verbalized understanding of the discharge instructions and warnings that would necessitate return to the Emergency Department  All questions were answered prior to discharge  Amount and/or Complexity of Data Reviewed  Clinical lab tests: reviewed  Review and summarize past medical records: yes        Disposition  Final diagnoses:   Pain of left calf   Alcohol abuse     Time reflects when diagnosis was documented in both MDM as applicable and the Disposition within this note     Time User Action Codes Description Comment    2/3/2022 11:10 PM Rafi Ferguson [W94 758] Pain of left calf     2/3/2022 11:10 PM Rafi Ferguson [F10 10] Alcohol abuse       ED Disposition     ED Disposition Condition Date/Time Comment    Discharge Stable u Feb 3, 2022 11:10 [facility]  Follow-up Information    None         Patient's Medications   Discharge Prescriptions    No medications on file       Outpatient Discharge Orders   VAS lower limb venous duplex study, unilateral/limited   Standing Status: Future Standing Exp   Date: 02/03/26       PDMP Review       Value Time User    PDMP Reviewed  Yes 6/10/2021 11:27 AM Hunter Christian DO          ED Provider  Electronically Signed by           Nomi Lim DO  02/03/22 1807

## 2022-02-04 NOTE — TELEPHONE ENCOUNTER
Reason for Disposition   [1] Thigh, calf, or ankle swelling AND [2] only 1 side    Answer Assessment - Initial Assessment Questions  1  ONSET: "When did the swelling start?" (e g , minutes, hours, days)      1-2 days    2  LOCATION: "What part of the leg is swollen?"  "Are both legs swollen or just one leg?"     Left Calf area    3  SEVERITY: "How bad is the swelling?" (e g , localized; mild, moderate, severe)   - Localized - small area of swelling localized to one leg   - MILD pedal edema - swelling limited to foot and ankle, pitting edema < 1/4 inch (6 mm) deep, rest and elevation eliminate most or all swelling   - MODERATE edema - swelling of lower leg to knee, pitting edema > 1/4 inch (6 mm) deep, rest and elevation only partially reduce swelling   - SEVERE edema - swelling extends above knee, facial or hand swelling present      Moderate    4  REDNESS: "Does the swelling look red or infected?"      Denies    5  PAIN: "Is the swelling painful to touch?" If Yes, ask: "How painful is it?"   (Scale 1-10; mild, moderate or severe)      Denies    6  FEVER: "Do you have a fever?" If Yes, ask: "What is it, how was it measured, and when did it start?"       Denies    7  CAUSE: "What do you think is causing the leg swelling?"      Denies    8  MEDICAL HISTORY: "Do you have a history of heart failure, kidney disease, liver failure, or cancer?"     Fatty liver disease    9  RECURRENT SYMPTOM: "Have you had leg swelling before?" If Yes, ask: "When was the last time?" "What happened that time?"      Denies    10  OTHER SYMPTOMS: "Do you have any other symptoms?" (e g , chest pain, difficulty breathing)        Denies    11   PREGNANCY: "Is there any chance you are pregnant?" "When was your last menstrual period?"        denies    Protocols used: LEG SWELLING AND EDEMA-ADULT-

## 2022-02-04 NOTE — TELEPHONE ENCOUNTER
Regarding: leg swelling   ----- Message from Nai Ruano sent at 2/3/2022  8:38 PM EST -----  "i've been having some swelling in my left leg, its an inch larger than my right leg   im not sure if i need to go get it checked out"

## 2022-02-18 ENCOUNTER — HOSPITAL ENCOUNTER (EMERGENCY)
Facility: HOSPITAL | Age: 37
Discharge: HOME | End: 2022-02-19
Attending: EMERGENCY MEDICINE
Payer: COMMERCIAL

## 2022-02-18 DIAGNOSIS — F10.929 ALCOHOL INTOXICATION (HCC): ICD-10-CM

## 2022-02-18 DIAGNOSIS — F10.10 ALCOHOL ABUSE: Primary | ICD-10-CM

## 2022-02-18 DIAGNOSIS — R10.11 RIGHT UPPER QUADRANT PAIN: ICD-10-CM

## 2022-02-18 LAB
ALBUMIN SERPL BCP-MCNC: 4.1 G/DL (ref 3.5–5)
ALP SERPL-CCNC: 57 U/L (ref 46–116)
ALT SERPL W P-5'-P-CCNC: 41 U/L (ref 12–78)
AMPHETAMINES SERPL QL SCN: NEGATIVE
ANION GAP SERPL CALCULATED.3IONS-SCNC: 15 MMOL/L (ref 4–13)
AST SERPL W P-5'-P-CCNC: 48 U/L (ref 5–45)
BACTERIA UR QL AUTO: ABNORMAL /HPF
BARBITURATES UR QL: NEGATIVE
BASOPHILS # BLD AUTO: 0.06 THOUSANDS/ΜL (ref 0–0.1)
BASOPHILS NFR BLD AUTO: 1 % (ref 0–1)
BENZODIAZ UR QL: NEGATIVE
BILIRUB SERPL-MCNC: 0.24 MG/DL (ref 0.2–1)
BILIRUB UR QL STRIP: NEGATIVE
BUN SERPL-MCNC: 11 MG/DL (ref 5–25)
CALCIUM SERPL-MCNC: 8.7 MG/DL (ref 8.3–10.1)
CHLORIDE SERPL-SCNC: 104 MMOL/L (ref 100–108)
CLARITY UR: CLEAR
CO2 SERPL-SCNC: 22 MMOL/L (ref 21–32)
COCAINE UR QL: NEGATIVE
COLOR UR: YELLOW
CREAT SERPL-MCNC: 0.71 MG/DL (ref 0.6–1.3)
EOSINOPHIL # BLD AUTO: 0 THOUSAND/ΜL (ref 0–0.61)
EOSINOPHIL NFR BLD AUTO: 0 % (ref 0–6)
ERYTHROCYTE [DISTWIDTH] IN BLOOD BY AUTOMATED COUNT: 15.9 % (ref 11.6–15.1)
ETHANOL SERPL-MCNC: 400 MG/DL (ref 0–3)
EXT PREG TEST URINE: NEGATIVE
EXT. CONTROL ED NAV: NORMAL
GFR SERPL CREATININE-BSD FRML MDRD: 111 ML/MIN/1.73SQ M
GLUCOSE SERPL-MCNC: 96 MG/DL (ref 65–140)
GLUCOSE UR STRIP-MCNC: NEGATIVE MG/DL
HCT VFR BLD AUTO: 48.6 % (ref 34.8–46.1)
HGB BLD-MCNC: 16.3 G/DL (ref 11.5–15.4)
HGB UR QL STRIP.AUTO: ABNORMAL
IMM GRANULOCYTES # BLD AUTO: 0.02 THOUSAND/UL (ref 0–0.2)
IMM GRANULOCYTES NFR BLD AUTO: 0 % (ref 0–2)
KETONES UR STRIP-MCNC: NEGATIVE MG/DL
LEUKOCYTE ESTERASE UR QL STRIP: NEGATIVE
LIPASE SERPL-CCNC: 299 U/L (ref 73–393)
LYMPHOCYTES # BLD AUTO: 3.75 THOUSANDS/ΜL (ref 0.6–4.47)
LYMPHOCYTES NFR BLD AUTO: 51 % (ref 14–44)
MCH RBC QN AUTO: 30.5 PG (ref 26.8–34.3)
MCHC RBC AUTO-ENTMCNC: 33.5 G/DL (ref 31.4–37.4)
MCV RBC AUTO: 91 FL (ref 82–98)
METHADONE UR QL: NEGATIVE
MONOCYTES # BLD AUTO: 0.64 THOUSAND/ΜL (ref 0.17–1.22)
MONOCYTES NFR BLD AUTO: 9 % (ref 4–12)
NEUTROPHILS # BLD AUTO: 2.86 THOUSANDS/ΜL (ref 1.85–7.62)
NEUTS SEG NFR BLD AUTO: 39 % (ref 43–75)
NITRITE UR QL STRIP: NEGATIVE
NON-SQ EPI CELLS URNS QL MICRO: ABNORMAL /HPF
NRBC BLD AUTO-RTO: 0 /100 WBCS
OPIATES UR QL SCN: NEGATIVE
OXYCODONE+OXYMORPHONE UR QL SCN: NEGATIVE
PCP UR QL: NEGATIVE
PH UR STRIP.AUTO: 5.5 [PH] (ref 4.5–8)
PLATELET # BLD AUTO: 170 THOUSANDS/UL (ref 149–390)
PMV BLD AUTO: 9.8 FL (ref 8.9–12.7)
POTASSIUM SERPL-SCNC: 4.1 MMOL/L (ref 3.5–5.3)
PROT SERPL-MCNC: 8.4 G/DL (ref 6.4–8.2)
PROT UR STRIP-MCNC: ABNORMAL MG/DL
RBC # BLD AUTO: 5.35 MILLION/UL (ref 3.81–5.12)
RBC #/AREA URNS AUTO: ABNORMAL /HPF
SODIUM SERPL-SCNC: 141 MMOL/L (ref 136–145)
SP GR UR STRIP.AUTO: <=1.005 (ref 1–1.03)
THC UR QL: POSITIVE
UROBILINOGEN UR QL STRIP.AUTO: 0.2 E.U./DL
WBC # BLD AUTO: 7.33 THOUSAND/UL (ref 4.31–10.16)
WBC #/AREA URNS AUTO: ABNORMAL /HPF

## 2022-02-18 PROCEDURE — 99284 EMERGENCY DEPT VISIT MOD MDM: CPT | Performed by: EMERGENCY MEDICINE

## 2022-02-18 PROCEDURE — 96361 HYDRATE IV INFUSION ADD-ON: CPT

## 2022-02-18 PROCEDURE — 36415 COLL VENOUS BLD VENIPUNCTURE: CPT | Performed by: EMERGENCY MEDICINE

## 2022-02-18 PROCEDURE — 81001 URINALYSIS AUTO W/SCOPE: CPT

## 2022-02-18 PROCEDURE — 83690 ASSAY OF LIPASE: CPT | Performed by: EMERGENCY MEDICINE

## 2022-02-18 PROCEDURE — 82077 ASSAY SPEC XCP UR&BREATH IA: CPT | Performed by: EMERGENCY MEDICINE

## 2022-02-18 PROCEDURE — 96360 HYDRATION IV INFUSION INIT: CPT

## 2022-02-18 PROCEDURE — 81025 URINE PREGNANCY TEST: CPT | Performed by: EMERGENCY MEDICINE

## 2022-02-18 PROCEDURE — 99285 EMERGENCY DEPT VISIT HI MDM: CPT

## 2022-02-18 PROCEDURE — 85025 COMPLETE CBC W/AUTO DIFF WBC: CPT | Performed by: EMERGENCY MEDICINE

## 2022-02-18 PROCEDURE — 80307 DRUG TEST PRSMV CHEM ANLYZR: CPT | Performed by: EMERGENCY MEDICINE

## 2022-02-18 PROCEDURE — 80053 COMPREHEN METABOLIC PANEL: CPT | Performed by: EMERGENCY MEDICINE

## 2022-02-18 RX ORDER — OXAZEPAM 10 MG
10 CAPSULE ORAL ONCE
Status: COMPLETED | OUTPATIENT
Start: 2022-02-18 | End: 2022-02-18

## 2022-02-18 RX ADMIN — SODIUM CHLORIDE 1000 ML: 0.9 INJECTION, SOLUTION INTRAVENOUS at 21:34

## 2022-02-18 RX ADMIN — OXAZEPAM 10 MG: 10 CAPSULE, GELATIN COATED ORAL at 22:08

## 2022-02-19 VITALS
WEIGHT: 254.19 LBS | HEART RATE: 100 BPM | BODY MASS INDEX: 43.63 KG/M2 | TEMPERATURE: 97.8 F | OXYGEN SATURATION: 98 % | DIASTOLIC BLOOD PRESSURE: 87 MMHG | RESPIRATION RATE: 18 BRPM | SYSTOLIC BLOOD PRESSURE: 144 MMHG

## 2022-02-19 LAB — ETHANOL EXG-MCNC: 0.14 MG/DL

## 2022-02-19 PROCEDURE — 82075 ASSAY OF BREATH ETHANOL: CPT | Performed by: EMERGENCY MEDICINE

## 2022-02-19 NOTE — ED NOTES
called crisis and stated that he is looking for placement for her but due to her insurance being inactive, she will most likely be placed on Monday  Pt, according to , is willing to go home and wait for placement until her insurance is activated as she has young children  Dr Vazquez Manual informed

## 2022-02-19 NOTE — ED CARE HANDOFF
Emergency Department Sign Out Note        Sign out and transfer of care from [facility]  See Separate Emergency Department note  The patient, Mago Abarca, was evaluated by the previous provider for alcohol intoxication  Workup Completed:  Labs/crisis/HOST  ED Course / Workup Pending (followup): Insurance will apparently be active on Monday and HOST to help with bed placement  Patient has HOST phone number on her cell phone  She is ok with going home and discussing with HOST as an outpatient  ED Course as of 02/19/22 1007   Sat Feb 19, 2022   0824 HOST contacted  Medically stable  Seen by Certified   Wants rehab  Procedures  MDM        Disposition  Final diagnoses:   Alcohol abuse   Right upper quadrant pain   Alcohol intoxication ([facility] [address] )     Time reflects when diagnosis was documented in both MDM as applicable and the Disposition within this note     Time User Action Codes Description Comment    2/19/2022  3:36 AM Evi Chun Add [F10 10] Alcohol abuse     2/19/2022  3:36 AM Evi Chun Add [R10 11] Right upper quadrant pain     2/19/2022 10:04 AM Fiona Espana Add [F10 929] Alcohol intoxication [facility])       ED Disposition     ED Disposition Condition Date/Time Comment    Discharge Stable Sat Feb 19, 2022 10:05 AM Mago Abarca discharge to home/self care  Follow-up Information     Follow up With Specialties Details Randy Bryan DO Internal Medicine, Family Medicine Schedule an appointment as soon as possible for a visit in 1 week If symptoms worsen gerson  [address] [facility]  [address] [address] [phone]      HOST  Call on 2/21/2022  You have their contact information on your phone          Current Discharge Medication List      CONTINUE these medications which have NOT CHANGED    Details   bisoprolol (ZEBETA) 10 MG tablet 1 and 1/2 tab PO q day  Qty: 45 tablet, Refills: 5    Associated Diagnoses: Essential hypertension      fluticasone (FLONASE) 50 mcg/act nasal spray 1 spray into each nostril daily      levothyroxine 50 mcg tablet Take 1 tablet (50 mcg total) by mouth daily  Qty: 30 tablet, Refills: 5    Associated Diagnoses: Hypothyroidism, unspecified type      venlafaxine (EFFEXOR-XR) 150 mg 24 hr capsule Take 1 capsule (150 mg total) by mouth daily  Qty: 30 capsule, Refills: 5    Associated Diagnoses: Generalized anxiety disorder      clonazePAM (KlonoPIN) 0 5 mg tablet Take 1 tablet (0 5 mg total) by mouth 2 (two) times a day as needed for anxiety  Qty: 60 tablet, Refills: 0    Associated Diagnoses: Generalized anxiety disorder      Multiple Vitamins-Minerals (MULTIVITAMIN ADULT PO) Take 1 tablet by mouth daily      ondansetron (ZOFRAN-ODT) 4 mg disintegrating tablet Take 1 tablet (4 mg total) by mouth every 6 (six) hours as needed for nausea or vomiting  Qty: 12 tablet, Refills: 0    Associated Diagnoses: Nausea and vomiting           No discharge procedures on file         ED Provider  Electronically Signed by     Maris Chase MD  02/19/22 1007

## 2022-02-19 NOTE — ED NOTES
HOST called to speak to PT  Pt given a portable phone to talk them        Bethany Jesus  02/19/22 1882

## 2022-02-19 NOTE — ED PROVIDER NOTES
History  Chief Complaint   Patient presents with    Withdrawal - Alcohol     pt reports drinking a bottle of vodka a day, requesting rehab and help with withdraw, tearful in triage, reports hx of withdraw, denies DT or seziure, last drink 45 minutes PTA     A 14-year-old female with past medical history of anxiety, hypertension and alcohol abuse; presents requesting detox and rehab from alcohol use  Patient reports she has been drinking daily over the past several weeks  Patient states she did attend rehab twice at the end of 2021 (September and November), having been sober until January when she began drinking again  Patient would like to attend rehab again  Patient also complains of right upper quadrant abdominal pain that has been present for the past several months  Patient has been seen in the ED for this pain before, diagnosed with a fatty liver  Patient states she has the abdominal pain daily, and believes it is somewhat improved when drinking  Patient also has daily nausea, vomiting and diarrhea  Patient has otherwise not had fever, chills, chest pain, shortness of breath, dysuria, peripheral edema and rashes  When questioned on suicidal ideations, patient reports that "she could" want to harm herself  Patient denies homicidal ideations  A/P:  1 ) Alcohol abuse, patient requesting detox and rehab  Also possible suicidal ideations  Will check basic lab work and plan to have patient evaluated by crisis to determine the appropriate disposition for rehab and treatment  Will give a dose of Serax to help prevent alcohol withdrawal   2 ) Right upper quadrant abdominal pain, acute on chronic  Will check lab work to evaluate for worsening liver function and pancreatitis  Will treat symptomatically  History provided by:  Patient and medical records      Prior to Admission Medications   Prescriptions Last Dose Informant Patient Reported? Taking?    Multiple Vitamins-Minerals (MULTIVITAMIN ADULT PO) Not Taking at Unknown time Self Yes No   Sig: Take 1 tablet by mouth daily   Patient not taking: Reported on 2022    bisoprolol (ZEBETA) 10 MG tablet 2022 at Unknown time  No Yes   Si and 1/2 tab PO q day   clonazePAM (KlonoPIN) 0 5 mg tablet More than a month at Unknown time  No No   Sig: Take 1 tablet (0 5 mg total) by mouth 2 (two) times a day as needed for anxiety   fluticasone (FLONASE) 50 mcg/act nasal spray 2022 at Unknown time  Yes Yes   Si spray into each nostril daily   levothyroxine 50 mcg tablet 2022 at Unknown time  No Yes   Sig: Take 1 tablet (50 mcg total) by mouth daily   ondansetron (ZOFRAN-ODT) 4 mg disintegrating tablet Not Taking at Unknown time  No No   Sig: Take 1 tablet (4 mg total) by mouth every 6 (six) hours as needed for nausea or vomiting   Patient not taking: Reported on 2022    venlafaxine (EFFEXOR-XR) 150 mg 24 hr capsule 2022 at Unknown time  No Yes   Sig: Take 1 capsule (150 mg total) by mouth daily      Facility-Administered Medications: None       Past Medical History:   Diagnosis Date    Insulin controlled gestational diabetes mellitus (GDM) during pregnancy, antepartum 2019    Metformin dinner/HS insulin  Last Assessment & Plan:  BG log reviewed today:- previous review on Friday with recommendation for increasing HS insulin  Fastings: high normal 1 hour PP: within the desired range   Discussed rationale and recommendation to change current medical therapy as listed:   Bedtime: 18 units Levemir  Continue same dose of Metformin, 1000 mg at dinner    Denies signs and sympto    Obesity        Past Surgical History:   Procedure Laterality Date     SECTION  2018     SECTION  10/20/2017    DILATION AND CURETTAGE OF UTERUS      MASTOIDECTOMY  2013    MASTOIDECTOMY Right     WISDOM TOOTH EXTRACTION         Family History   Problem Relation Age of Onset    Multiple sclerosis Mother     Thyroid disease Mother    Rex Abt Diabetes Mother     Hypertension Mother     COPD Mother     Rectal cancer Father 47    Hyperlipidemia Father     Hypertension Father     Alcohol abuse Father     Depression Sister     Hypertension Sister      I have reviewed and agree with the history as documented  E-Cigarette/Vaping    E-Cigarette Use Never User      E-Cigarette/Vaping Substances    Nicotine No     THC No     CBD No     Flavoring No     Other No     Unknown No      Social History     Tobacco Use    Smoking status: Current Every Day Smoker     Packs/day: 0 25     Types: Cigarettes     Last attempt to quit: 2017     Years since quittin 1    Smokeless tobacco: Never Used   Vaping Use    Vaping Use: Never used   Substance Use Topics    Alcohol use: Yes    Drug use: Yes     Types: Marijuana       Review of Systems   Gastrointestinal: Positive for abdominal pain, diarrhea, nausea and vomiting  Psychiatric/Behavioral: Positive for dysphoric mood and suicidal ideas  All other systems reviewed and are negative  Physical Exam  Physical Exam  General Appearance: alert and oriented, anxious and tearful, non toxic appearing  Skin:  Warm, dry, intact  HEENT: atraumatic, normocephalic  Neck: Supple, trachea midline  Cardiac: Regular rhythm, tachycardic to 120's; no murmurs, rub, gallops  Pulmonary: lungs CTAB; no wheezes, rales, rhonchi  Gastrointestinal: abdomen soft, right upper quadrant tenderness, nondistended; no guarding or rebound tenderness; good bowel sounds, no mass or bruits  Extremities:  no pedal edema, 2+ pulses; no calf tenderness, no clubbing, no cyanosis  Neuro:  no focal motor or sensory deficits, CN 2-12 grossly intact  Psych:  Anxious    Normal mood and affect, normal judgement and insight      Vital Signs  ED Triage Vitals   Temperature Pulse Respirations Blood Pressure SpO2   22   97 8 °F (36 6 °C) (!) 126 22 (!) 180/104 98 %      Temp src Heart Rate Source Patient Position - Orthostatic VS BP Location FiO2 (%)   -- 02/18/22 2102 02/18/22 2102 02/18/22 2102 --    Monitor Sitting Right arm       Pain Score       02/18/22 2102       No Pain           Vitals:    02/18/22 2104 02/18/22 2210 02/19/22 0336 02/19/22 0337   BP: (!) 180/104 160/93 116/67 116/67   Pulse:  92 100 100   Patient Position - Orthostatic VS:  Lying Lying Lying         Visual Acuity      ED Medications  Medications   sodium chloride 0 9 % bolus 1,000 mL (0 mL Intravenous Stopped 2/18/22 2343)   oxazepam (SERAX) capsule 10 mg (10 mg Oral Given 2/18/22 2208)       Diagnostic Studies  Results Reviewed     Procedure Component Value Units Date/Time    POCT alcohol breath test [[id number]]  (Normal) Resulted: 02/19/22 0557    Lab Status: Final result Updated: 02/19/22 0558     EXTBreath Alcohol 0 140    Urine Microscopic [[id number]]  (Abnormal) Collected: 02/18/22 2129    Lab Status: Final result Specimen: Urine, Clean Catch Updated: 02/18/22 2221     RBC, UA 0-1 /hpf      WBC, UA None Seen /hpf      Epithelial Cells Occasional /hpf      Bacteria, UA Occasional /hpf     Ethanol [[id number]]  (Abnormal) Collected: 02/18/22 2133    Lab Status: Final result Specimen: Blood from Arm, Left Updated: 02/18/22 2213     Ethanol Lvl 400 mg/dL     Rapid drug screen, urine [[id number]]  (Abnormal) Collected: 02/18/22 2126    Lab Status: Final result Specimen: Urine, Clean Catch Updated: 02/18/22 2209     Amph/Meth UR Negative     Barbiturate Ur Negative     Benzodiazepine Urine Negative     Cocaine Urine Negative     Methadone Urine Negative     Opiate Urine Negative     PCP Ur Negative     THC Urine Positive     Oxycodone Urine Negative    Narrative:      Presumptive report  If requested, specimen will be sent to reference lab for confirmation  FOR MEDICAL PURPOSES ONLY  IF CONFIRMATION NEEDED PLEASE CONTACT THE LAB WITHIN 5 DAYS      Drug Screen Cutoff Levels:  AMPHETAMINE/METHAMPHETAMINES  1000 ng/mL  BARBITURATES     200 ng/mL  BENZODIAZEPINES     200 ng/mL  COCAINE      300 ng/mL  METHADONE      300 ng/mL  OPIATES      300 ng/mL  PHENCYCLIDINE     25 ng/mL  THC       50 ng/mL  OXYCODONE      100 ng/mL    Comprehensive metabolic panel [[id number]]  (Abnormal) Collected: 02/18/22 2133    Lab Status: Final result Specimen: Blood from Arm, Left Updated: 02/18/22 2155     Sodium 141 mmol/L      Potassium 4 1 mmol/L      Chloride 104 mmol/L      CO2 22 mmol/L      ANION GAP 15 mmol/L      BUN 11 mg/dL      Creatinine 0 71 mg/dL      Glucose 96 mg/dL      Calcium 8 7 mg/dL      AST 48 U/L      ALT 41 U/L      Alkaline Phosphatase 57 U/L      Total Protein 8 4 g/dL      Albumin 4 1 g/dL      Total Bilirubin 0 24 mg/dL      eGFR 111 ml/min/1 73sq m     Narrative:      [place] guidelines for Chronic Kidney Disease (CKD):     Stage 1 with normal or high GFR (GFR > 90 mL/min/1 73 square meters)    Stage 2 Mild CKD (GFR = 60-89 mL/min/1 73 square meters)    Stage 3A Moderate CKD (GFR = 45-59 mL/min/1 73 square meters)    Stage 3B Moderate CKD (GFR = 30-44 mL/min/1 73 square meters)    Stage 4 Severe CKD (GFR = 15-29 mL/min/1 73 square meters)    Stage 5 End Stage CKD (GFR <15 mL/min/1 73 square meters)  Note: GFR calculation is accurate only with a steady state creatinine    Lipase [[id number]]  (Normal) Collected: 02/18/22 2133    Lab Status: Final result Specimen: Blood from Arm, Left Updated: 02/18/22 2155     Lipase 299 u/L     CBC and differential [[id number]]  (Abnormal) Collected: 02/18/22 2133    Lab Status: Final result Specimen: Blood from Arm, Left Updated: 02/18/22 2140     WBC 7 33 Thousand/uL      RBC 5 35 Million/uL      Hemoglobin 16 3 g/dL      Hematocrit 48 6 %      MCV 91 fL      MCH 30 5 pg      MCHC 33 5 g/dL      RDW 15 9 %      MPV 9 8 fL      Platelets 258 Thousands/uL      nRBC 0 /100 WBCs      Neutrophils Relative 39 %      Immat GRANS % 0 %      Lymphocytes Relative 51 %      Monocytes Relative 9 %      Eosinophils Relative 0 %      Basophils Relative 1 %      Neutrophils Absolute 2 86 Thousands/µL      Immature Grans Absolute 0 02 Thousand/uL      Lymphocytes Absolute 3 75 Thousands/µL      Monocytes Absolute 0 64 Thousand/µL      Eosinophils Absolute 0 00 Thousand/µL      Basophils Absolute 0 06 Thousands/µL     POCT pregnancy, urine [[id number]]  (Normal) Resulted: 02/18/22 2131    Lab Status: Final result Updated: 02/18/22 2132     EXT PREG TEST UR (Ref: Negative) negative     Control valid    Urine Macroscopic, POC [[id number]]  (Abnormal) Collected: 02/18/22 2129    Lab Status: Final result Specimen: Urine Updated: 02/18/22 2131     Color, UA Yellow     Clarity, UA Clear     pH, UA 5 5     Leukocytes, UA Negative     Nitrite, UA Negative     Protein,  (2+) mg/dl      Glucose, UA Negative mg/dl      Ketones, UA Negative mg/dl      Urobilinogen, UA 0 2 E U /dl      Bilirubin, UA Negative     Blood, UA Small     Specific [place], UA <=1 005    Narrative:      CLINITEK RESULT                 No orders to display              Procedures  Procedures         ED Course  ED Course as of 02/19/22 9671   [facility] Feb 18, 2022   2155 Labs with mild elevation of AST and anion gap, likely due to alcohol use  RUQ pain/tenderness likely secondary to previously diagnosed fatty liver and alcohol use  [address]): 400  Due to suicidal ideations, pt will need to be monitored until sober  Then evaluated by crisis to determine appropriate disposition  Sat Feb 19, 2022   0335 Pt awake and alert, complaining of RUQ pain however otherwise feels well  Repeat   Will continue to monitor  0540 Repeat , concern that BAT obtained earlier may have been inaccurate due to rapid drop from initial medical alcohol  Will continue to monitor  1535 Pt signed out to Dr Anna Mruguia, plan for crisis evaluation when sober due to suicidal ideations    If pt denies SI, HOST evaluation for detox/rehab  MDM    Disposition  Final diagnoses:   Alcohol abuse   Right upper quadrant pain     Time reflects when diagnosis was documented in both MDM as applicable and the Disposition within this note     Time User Action Codes Description Comment    2/19/2022  3:36 AM Evi Chun Add [F10 10] Alcohol abuse     2/19/2022  3:36 AM Evi Chun [R10 11] Right upper quadrant pain       ED Disposition     None      Follow-up Information    None         Patient's Medications   Discharge Prescriptions    No medications on file       No discharge procedures on file      PDMP Review       Value Time User    PDMP Reviewed  Yes 6/10/2021 11:27 AM Nestor Pelletier DO          ED Provider  Electronically Signed by           Matilda Grady DO  02/19/22 9514

## 2022-02-19 NOTE — CERTIFIED RECOVERY SPECIALIST
Certified  Note    Patient name: Rakel Arreola  Location: ED 10/ED [address]: [address]  Attending:  Laura Stone * MRN [id number]  : 1985  Age: 31 y o  Sex: female Date 2022         Substance Use History:     Social History     Substance and Sexual Activity   Alcohol Use Yes        Social History     Substance and Sexual Activity   Drug Use Yes    Types: Marijuana         Admission Information  Substances Used at This Admission[de-identified] Alcohol  Readmission in Last 30 Days?: No  Encounter Type[de-identified] Patient Face-to-Face    Recovery Support Plan  Declined All Services?: No  Medication Assisted Treatment[de-identified] No  Agreeable to Warm Handoff?: Yes  Is Patient Accepting DINA Treatment Services?: Yes  Was Referral Made to [facility]?: No  Recovery Goals[de-identified] Patient will engage with HOST for assessment for detoxification treatment  Was Narcan Provided at Discharge?: No  Plan Discussed With Treatment Team[de-identified] Yes  Plan Discussed With[de-identified] Provider    Referral to Recovery Supports:  Julian Gold[de-identified] No  Community Based CRS[de-identified] No  Case Management[de-identified] No  Direct Access to DINA Treatment?: Yes  Resource Guide Given?: Yes  Follow Up With Patient[de-identified] No  Family / Other Support[de-identified] No  Referral for Community Physical Health[de-identified] No  Referral for Community Mental Health[de-identified] No'    CRS engaged with patient in [facility] ED  CRS and patient spoke about how she is feeling and her motivation for change  Patient agreed to an assessment  Patient did state that there was an issue with her insurance, [employer], and that she tried to find treatment herself  CRS provided patient with recovery resources as well as contact information for further support  Contact with patient discussed with provider and a HOST referral will be made for treatment services        Vijaya Reno

## 2022-04-26 ENCOUNTER — HOSPITAL ENCOUNTER (EMERGENCY)
Facility: HOSPITAL | Age: 37
Discharge: HOME | End: 2022-04-26
Attending: EMERGENCY MEDICINE | Admitting: EMERGENCY MEDICINE
Payer: COMMERCIAL

## 2022-04-26 ENCOUNTER — APPOINTMENT (EMERGENCY)
Dept: RADIOLOGY | Facility: HOSPITAL | Age: 37
End: 2022-04-26
Payer: COMMERCIAL

## 2022-04-26 ENCOUNTER — NURSE TRIAGE (OUTPATIENT)
Dept: OTHER | Facility: OTHER | Age: 37
End: 2022-04-26

## 2022-04-26 VITALS
OXYGEN SATURATION: 95 % | HEART RATE: 106 BPM | TEMPERATURE: 97.8 F | DIASTOLIC BLOOD PRESSURE: 84 MMHG | BODY MASS INDEX: 43.36 KG/M2 | WEIGHT: 254 LBS | RESPIRATION RATE: 22 BRPM | SYSTOLIC BLOOD PRESSURE: 125 MMHG | HEIGHT: 64 IN

## 2022-04-26 DIAGNOSIS — R07.89 CHEST WALL PAIN: Primary | ICD-10-CM

## 2022-04-26 LAB
ALBUMIN SERPL BCP-MCNC: 4 G/DL (ref 3.5–5)
ALP SERPL-CCNC: 66 U/L (ref 46–116)
ALT SERPL W P-5'-P-CCNC: 86 U/L (ref 12–78)
ANION GAP SERPL CALCULATED.3IONS-SCNC: 14 MMOL/L (ref 4–13)
AST SERPL W P-5'-P-CCNC: 106 U/L (ref 5–45)
B-HCG SERPL-ACNC: <2 MIU/ML
BACTERIA UR QL AUTO: ABNORMAL /HPF
BASOPHILS # BLD MANUAL: 0 THOUSAND/UL (ref 0–0.1)
BASOPHILS NFR MAR MANUAL: 0 % (ref 0–1)
BILIRUB SERPL-MCNC: 0.3 MG/DL (ref 0.2–1)
BILIRUB UR QL STRIP: NEGATIVE
BUN SERPL-MCNC: 6 MG/DL (ref 5–25)
CALCIUM SERPL-MCNC: 8.7 MG/DL (ref 8.3–10.1)
CHLORIDE SERPL-SCNC: 98 MMOL/L (ref 100–108)
CLARITY UR: CLEAR
CO2 SERPL-SCNC: 24 MMOL/L (ref 21–32)
COLOR UR: YELLOW
CREAT SERPL-MCNC: 0.71 MG/DL (ref 0.6–1.3)
EOSINOPHIL # BLD MANUAL: 0 THOUSAND/UL (ref 0–0.4)
EOSINOPHIL NFR BLD MANUAL: 0 % (ref 0–6)
ERYTHROCYTE [DISTWIDTH] IN BLOOD BY AUTOMATED COUNT: 16.6 % (ref 11.6–15.1)
EXT PREG TEST URINE: NEGATIVE
EXT. CONTROL ED NAV: NORMAL
GFR SERPL CREATININE-BSD FRML MDRD: 110 ML/MIN/1.73SQ M
GLUCOSE SERPL-MCNC: 90 MG/DL (ref 65–140)
GLUCOSE UR STRIP-MCNC: NEGATIVE MG/DL
HCT VFR BLD AUTO: 44.8 % (ref 34.8–46.1)
HGB BLD-MCNC: 15 G/DL (ref 11.5–15.4)
HGB UR QL STRIP.AUTO: NEGATIVE
KETONES UR STRIP-MCNC: NEGATIVE MG/DL
LEUKOCYTE ESTERASE UR QL STRIP: NEGATIVE
LIPASE SERPL-CCNC: 219 U/L (ref 73–393)
LYMPHOCYTES # BLD AUTO: 2.5 THOUSAND/UL (ref 0.6–4.47)
LYMPHOCYTES # BLD AUTO: 33 % (ref 14–44)
MCH RBC QN AUTO: 29.9 PG (ref 26.8–34.3)
MCHC RBC AUTO-ENTMCNC: 33.5 G/DL (ref 31.4–37.4)
MCV RBC AUTO: 89 FL (ref 82–98)
MONOCYTES # BLD AUTO: 0.45 THOUSAND/UL (ref 0–1.22)
MONOCYTES NFR BLD: 6 % (ref 4–12)
NEUTROPHILS # BLD MANUAL: 4.47 THOUSAND/UL (ref 1.85–7.62)
NEUTS SEG NFR BLD AUTO: 59 % (ref 43–75)
NITRITE UR QL STRIP: NEGATIVE
NON-SQ EPI CELLS URNS QL MICRO: ABNORMAL /HPF
PH UR STRIP.AUTO: 5.5 [PH] (ref 4.5–8)
PLATELET # BLD AUTO: 141 THOUSANDS/UL (ref 149–390)
PLATELET BLD QL SMEAR: ADEQUATE
PMV BLD AUTO: 9.9 FL (ref 8.9–12.7)
POTASSIUM SERPL-SCNC: 3.7 MMOL/L (ref 3.5–5.3)
PROT SERPL-MCNC: 8.1 G/DL (ref 6.4–8.2)
PROT UR STRIP-MCNC: ABNORMAL MG/DL
RBC # BLD AUTO: 5.01 MILLION/UL (ref 3.81–5.12)
RBC #/AREA URNS AUTO: ABNORMAL /HPF
RBC MORPH BLD: NORMAL
SODIUM SERPL-SCNC: 136 MMOL/L (ref 136–145)
SP GR UR STRIP.AUTO: <=1.005 (ref 1–1.03)
UROBILINOGEN UR QL STRIP.AUTO: 0.2 E.U./DL
VARIANT LYMPHS # BLD AUTO: 2 %
WBC # BLD AUTO: 7.58 THOUSAND/UL (ref 4.31–10.16)
WBC #/AREA URNS AUTO: ABNORMAL /HPF

## 2022-04-26 PROCEDURE — 85007 BL SMEAR W/DIFF WBC COUNT: CPT | Performed by: EMERGENCY MEDICINE

## 2022-04-26 PROCEDURE — 93005 ELECTROCARDIOGRAM TRACING: CPT

## 2022-04-26 PROCEDURE — 99285 EMERGENCY DEPT VISIT HI MDM: CPT | Performed by: EMERGENCY MEDICINE

## 2022-04-26 PROCEDURE — 36415 COLL VENOUS BLD VENIPUNCTURE: CPT | Performed by: EMERGENCY MEDICINE

## 2022-04-26 PROCEDURE — 83690 ASSAY OF LIPASE: CPT | Performed by: EMERGENCY MEDICINE

## 2022-04-26 PROCEDURE — 71046 X-RAY EXAM CHEST 2 VIEWS: CPT

## 2022-04-26 PROCEDURE — 80053 COMPREHEN METABOLIC PANEL: CPT | Performed by: EMERGENCY MEDICINE

## 2022-04-26 PROCEDURE — 84702 CHORIONIC GONADOTROPIN TEST: CPT | Performed by: EMERGENCY MEDICINE

## 2022-04-26 PROCEDURE — 81001 URINALYSIS AUTO W/SCOPE: CPT

## 2022-04-26 PROCEDURE — 96374 THER/PROPH/DIAG INJ IV PUSH: CPT

## 2022-04-26 PROCEDURE — 81025 URINE PREGNANCY TEST: CPT | Performed by: EMERGENCY MEDICINE

## 2022-04-26 PROCEDURE — 85027 COMPLETE CBC AUTOMATED: CPT | Performed by: EMERGENCY MEDICINE

## 2022-04-26 PROCEDURE — 99284 EMERGENCY DEPT VISIT MOD MDM: CPT

## 2022-04-26 RX ORDER — KETOROLAC TROMETHAMINE 30 MG/ML
15 INJECTION, SOLUTION INTRAMUSCULAR; INTRAVENOUS ONCE
Status: COMPLETED | OUTPATIENT
Start: 2022-04-26 | End: 2022-04-26

## 2022-04-26 RX ORDER — LIDOCAINE 50 MG/G
1 PATCH TOPICAL ONCE
Status: DISCONTINUED | OUTPATIENT
Start: 2022-04-26 | End: 2022-04-27 | Stop reason: HOSPADM

## 2022-04-26 RX ADMIN — LIDOCAINE 1 PATCH: 50 PATCH TOPICAL at 22:36

## 2022-04-26 RX ADMIN — KETOROLAC TROMETHAMINE 15 MG: 30 INJECTION, SOLUTION INTRAMUSCULAR at 22:36

## 2022-04-26 NOTE — TELEPHONE ENCOUNTER
Regarding: right side pain/ alcohol problem   ----- Message from Gavin Brown sent at 4/26/2022  6:41 PM EDT -----  " My right side is really uncomfortable, I haven't felt ok since I woke up this morning   I have a problem with alcohol  "

## 2022-04-26 NOTE — TELEPHONE ENCOUNTER
Reason for Disposition   [1] SEVERE pain (e g , excruciating) AND [2] present > 1 hour    Answer Assessment - Initial Assessment Questions  1  LOCATION: "Where does it hurt?"       Right side of abdomen    2  RADIATION: "Does the pain shoot anywhere else?" (e g , chest, back)      Denies    3  ONSET: "When did the pain begin?" (e g , minutes, hours or days ago)       On going    4  SUDDEN: "Gradual or sudden onset?"      Sudden    5  PATTERN "Does the pain come and go, or is it constant?"     - If constant: "Is it getting better, staying the same, or worsening?"       (Note: Constant means the pain never goes away completely; most serious pain is constant and it progresses)      - If intermittent: "How long does it last?" "Do you have pain now?"      (Note: Intermittent means the pain goes away completely between bouts)      Constant    6  SEVERITY: "How bad is the pain?"  (e g , Scale 1-10; mild, moderate, or severe)     - MILD (1-3): doesn't interfere with normal activities, abdomen soft and not tender to touch      - MODERATE (4-7): interferes with normal activities or awakens from sleep, tender to touch      - SEVERE (8-10): excruciating pain, doubled over, unable to do any normal activities        20/10    7  RECURRENT SYMPTOM: "Have you ever had this type of stomach pain before?" If Yes, ask: "When was the last time?" and "What happened that time?"       Denies    8  AGGRAVATING FACTORS: "Does anything seem to cause this pain?" (e g , foods, stress, alcohol)      Alcohol    9  CARDIAC SYMPTOMS: "Do you have any of the following symptoms: chest pain, difficulty breathing, sweating, nausea?"      Yes    10  OTHER SYMPTOMS: "Do you have any other symptoms?" (e g , fever, vomiting, diarrhea)        Nausea    11   PREGNANCY: "Is there any chance you are pregnant?" "When was your last menstrual period?"        Denies    Protocols used: ABDOMINAL PAIN - UPPER-ADULT-

## 2022-04-27 LAB
ATRIAL RATE: 105 BPM
P AXIS: 79 DEGREES
PR INTERVAL: 132 MS
QRS AXIS: 75 DEGREES
QRSD INTERVAL: 86 MS
QT INTERVAL: 340 MS
QTC INTERVAL: 449 MS
T WAVE AXIS: 60 DEGREES
VENTRICULAR RATE: 105 BPM

## 2022-04-27 PROCEDURE — 93010 ELECTROCARDIOGRAM REPORT: CPT | Performed by: INTERNAL MEDICINE

## 2022-04-27 NOTE — ED PROVIDER NOTES
History  Chief Complaint   Patient presents with    Abdominal Pain     patient presents to ED with RUQ pain that began this morning, patient reports having hx of ETOH abuse     29-year-old female with history diabetes, hypertension, obesity, anxiety and alcohol abuse complains of right chest wall pain since this morning  She states this similar but worse than prior episodes of this pain  In the past she was seen here and told she needs to stop drinking alcohol so much  She has no known gallstones or kidney stones  Denies associated fever, nausea, dysuria, hematuria, diarrhea, dyspnea or cough  Has had some mild constipation last few days but did take a laxative with results yesterday  Patient denies injury  Prior to Admission Medications   Prescriptions Last Dose Informant Patient Reported? Taking?    Multiple Vitamins-Minerals (MULTIVITAMIN ADULT PO)  Self Yes No   Sig: Take 1 tablet by mouth daily   Patient not taking: Reported on 2022    bisoprolol (ZEBETA) 10 MG tablet   No No   Si and 1/2 tab PO q day   clonazePAM (KlonoPIN) 0 5 mg tablet   No No   Sig: Take 1 tablet (0 5 mg total) by mouth 2 (two) times a day as needed for anxiety   fluticasone (FLONASE) 50 mcg/act nasal spray   Yes No   Si spray into each nostril daily   levothyroxine 50 mcg tablet   No No   Sig: Take 1 tablet (50 mcg total) by mouth daily   ondansetron (ZOFRAN-ODT) 4 mg disintegrating tablet   No No   Sig: Take 1 tablet (4 mg total) by mouth every 6 (six) hours as needed for nausea or vomiting   Patient not taking: Reported on 2022    venlafaxine (EFFEXOR-XR) 150 mg 24 hr capsule   No No   Sig: Take 1 capsule (150 mg total) by mouth daily      Facility-Administered Medications: None       Past Medical History:   Diagnosis Date    ETOH abuse     Insulin controlled gestational diabetes mellitus (GDM) during pregnancy, antepartum 2019    Metformin dinner/HS insulin  Last Assessment & Plan:  BG log reviewed today:- previous review on Friday with recommendation for increasing HS insulin  Fastings: high normal 1 hour PP: within the desired range   Discussed rationale and recommendation to change current medical therapy as listed:   Bedtime: 18 units Levemir  Continue same dose of Metformin, 1000 mg at dinner  Denies signs and sympto    Obesity        Past Surgical History:   Procedure Laterality Date     SECTION  2018     SECTION  10/20/2017    DILATION AND CURETTAGE OF UTERUS      MASTOIDECTOMY  2013    MASTOIDECTOMY Right     WISDOM TOOTH EXTRACTION         Family History   Problem Relation Age of Onset    Multiple sclerosis Mother     Thyroid disease Mother     Diabetes Mother     Hypertension Mother     COPD Mother     Rectal cancer Father 47    Hyperlipidemia Father     Hypertension Father     Alcohol abuse Father     Depression Sister     Hypertension Sister      I have reviewed and agree with the history as documented  E-Cigarette/Vaping    E-Cigarette Use Never User      E-Cigarette/Vaping Substances    Nicotine No     THC No     CBD No     Flavoring No     Other No     Unknown No      Social History     Tobacco Use    Smoking status: Current Every Day Smoker     Packs/day: 0 25     Types: Cigarettes     Last attempt to quit: 2017     Years since quittin 3    Smokeless tobacco: Never Used   Vaping Use    Vaping Use: Never used   Substance Use Topics    Alcohol use: Yes    Drug use: Yes     Types: Marijuana       Review of Systems   Constitutional: Negative for activity change, diaphoresis, fatigue and fever  HENT: Negative for congestion and sore throat  Respiratory: Negative for cough and shortness of breath  Cardiovascular: Positive for chest pain  Negative for palpitations and leg swelling  Gastrointestinal: Positive for abdominal pain (In the past)  Negative for blood in stool, diarrhea, nausea and vomiting     Genitourinary: Negative for dysuria, flank pain and hematuria  Musculoskeletal: Positive for myalgias  Negative for arthralgias and neck pain  Neurological: Negative for dizziness, seizures, syncope and headaches  All other systems reviewed and are negative  Physical Exam  Physical Exam  Vitals and nursing note reviewed  Constitutional:       General: She is not in acute distress  Appearance: She is well-developed  She is obese  She is not ill-appearing or diaphoretic  HENT:      Head: Normocephalic and atraumatic  Mouth/Throat:      Mouth: Mucous membranes are moist       Pharynx: Oropharynx is clear  Eyes:      General: No scleral icterus  Extraocular Movements: Extraocular movements intact  Conjunctiva/sclera: Conjunctivae normal       Pupils: Pupils are equal, round, and reactive to light  Cardiovascular:      Rate and Rhythm: Normal rate and regular rhythm  Heart sounds: Normal heart sounds  No murmur heard  Pulmonary:      Effort: Pulmonary effort is normal       Breath sounds: Normal breath sounds  Chest:      Chest wall: Tenderness (Palpation of right chest wall reproduces patient's pain  There is no crepitance, deformity, rash or abnormal skin markings ) present  Abdominal:      General: Abdomen is flat  Bowel sounds are normal  There is no distension or abdominal bruit  Palpations: Abdomen is soft  There is no fluid wave or mass  Tenderness: There is no abdominal tenderness  Musculoskeletal:         General: Normal range of motion  Cervical back: Normal range of motion and neck supple  Skin:     General: Skin is warm and dry  Capillary Refill: Capillary refill takes less than 2 seconds  Findings: No rash  Neurological:      General: No focal deficit present  Mental Status: She is alert and oriented to person, place, and time  Cranial Nerves: No cranial nerve deficit  Motor: No weakness        Coordination: Coordination normal       Deep Tendon Reflexes: Reflexes are normal and symmetric  Psychiatric:         Mood and Affect: Mood is anxious  Behavior: Behavior normal          Vital Signs  ED Triage Vitals   Temperature Pulse Respirations Blood Pressure SpO2   04/26/22 1952 04/26/22 1952 04/26/22 1952 04/26/22 1955 04/26/22 1952   97 8 °F (36 6 °C) (!) 112 18 (!) 175/102 98 %      Temp src Heart Rate Source Patient Position - Orthostatic VS BP Location FiO2 (%)   -- 04/26/22 1952 04/26/22 1952 04/26/22 1952 --    Monitor Sitting Right arm       Pain Score       04/26/22 1952       8           Vitals:    04/26/22 2130 04/26/22 2200 04/26/22 2230 04/26/22 2250   BP: 125/84      Pulse: (!) 116 (!) 123 (!) 120 (!) 106   Patient Position - Orthostatic VS:             Visual Acuity      ED Medications  Medications   lidocaine (LIDODERM) 5 % patch 1 patch (1 patch Topical Medication Applied 4/26/22 2236)   ketorolac (TORADOL) injection 15 mg (15 mg Intravenous Given 4/26/22 2236)       Diagnostic Studies  Results Reviewed     Procedure Component Value Units Date/Time    Urine Microscopic [[id number]]  (Abnormal) Collected: 04/26/22 2043    Lab Status: Final result Specimen: Urine, Clean Catch Updated: 04/26/22 2119     RBC, UA 0-1 /hpf      WBC, UA 2-4 /hpf      Epithelial Cells Moderate /hpf      Bacteria, UA Occasional /hpf     CBC and differential [[id number]]  (Abnormal) Collected: 04/26/22 2005    Lab Status: Final result Specimen: Blood from Arm, Right Updated: 04/26/22 2056     WBC 7 58 Thousand/uL      RBC 5 01 Million/uL      Hemoglobin 15 0 g/dL      Hematocrit 44 8 %      MCV 89 fL      MCH 29 9 pg      MCHC 33 5 g/dL      RDW 16 6 %      MPV 9 9 fL      Platelets 956 Thousands/uL     Narrative: This is an appended report  These results have been appended to a previously verified report      Manual Differential(PHLEBS Do Not Order) [[id number]]  (Abnormal) Collected: 04/26/22 2005    Lab Status: Final result Specimen: Blood from Arm, Right Updated: 04/26/22 2056     Segmented % 59 %      Lymphocytes % 33 %      Monocytes % 6 %      Eosinophils, % 0 %      Basophils % 0 %      Atypical Lymphocytes % 2 %      Absolute Neutrophils 4 47 Thousand/uL      Lymphocytes Absolute 2 50 Thousand/uL      Monocytes Absolute 0 45 Thousand/uL      Eosinophils Absolute 0 00 Thousand/uL      Basophils Absolute 0 00 Thousand/uL      Total Counted --     RBC Morphology Normal     Platelet Estimate Adequate    Lipase [[id number]]  (Normal) Collected: 04/26/22 2005    Lab Status: Final result Specimen: Blood from Arm, Right Updated: 04/26/22 2046     Lipase 219 u/L     hCG, quantitative, pregnancy [[id number]]  (Normal) Collected: 04/26/22 2005    Lab Status: Final result Specimen: Blood from Arm, Right Updated: 04/26/22 2046     HCG, Quant <2 mIU/mL     Narrative:       Expected Ranges:     Approximate               Approximate HCG  Gestation age          Concentration ( mIU/mL)  _____________          ______________________   Jessica Rebel                      HCG values  0 2-1                       5-50  1-2                           2-3                         100-5000  3-4                         500-92135  4-5                         1000-45858  5-6                         [id number]  6-8                         [id number]  8-12                        04560-107135      POCT pregnancy, urine [[id number]]  (Normal) Resulted: 04/26/22 2045    Lab Status: Final result Updated: 04/26/22 2045     EXT PREG TEST UR (Ref: Negative) negative     Control valid    Urine Macroscopic, POC [[id number]]  (Abnormal) Collected: 04/26/22 2043    Lab Status: Final result Specimen: Urine Updated: 04/26/22 2045     Color, UA Yellow     Clarity, UA Clear     pH, UA 5 5     Leukocytes, UA Negative     Nitrite, UA Negative     Protein,  (2+) mg/dl      Glucose, UA Negative mg/dl      Ketones, UA Negative mg/dl      Urobilinogen, UA 0 2 E U /dl Bilirubin, UA Negative     Blood, UA Negative     Specific Gravity, UA <=1 005    Comprehensive metabolic panel [[id number]]  (Abnormal) Collected: 04/26/22 2005    Lab Status: Final result Specimen: Blood from Arm, Right Updated: 04/26/22 2041     Sodium 136 mmol/L      Potassium 3 7 mmol/L      Chloride 98 mmol/L      CO2 24 mmol/L      ANION GAP 14 mmol/L      BUN 6 mg/dL      Creatinine 0 71 mg/dL      Glucose 90 mg/dL      Calcium 8 7 mg/dL       U/L      ALT 86 U/L      Alkaline Phosphatase 66 U/L      Total Protein 8 1 g/dL      Albumin 4 0 g/dL      Total Bilirubin 0 30 mg/dL      eGFR 110 ml/min/1 73sq m     Narrative:      [place] guidelines for Chronic Kidney Disease (CKD):     Stage 1 with normal or high GFR (GFR > 90 mL/min/1 73 square meters)    Stage 2 Mild CKD (GFR = 60-89 mL/min/1 73 square meters)    Stage 3A Moderate CKD (GFR = 45-59 mL/min/1 73 square meters)    Stage 3B Moderate CKD (GFR = 30-44 mL/min/1 73 square meters)    Stage 4 Severe CKD (GFR = 15-29 mL/min/1 73 square meters)    Stage 5 End Stage CKD (GFR <15 mL/min/1 73 square meters)  Note: GFR calculation is accurate only with a steady state creatinine                 XR chest 2 views   ED Interpretation by Radha Lizama DO (04/26 2250)   Unremarkable chest x-ray                 Procedures  ECG 12 Lead Documentation Only    Date/Time: 4/26/2022 8:18 PM  Performed by: Radha Lizama DO  Authorized by: Radha Lizama DO     ECG reviewed by me, the ED Provider: yes    Patient location:  ED  Previous ECG:     Previous ECG:  Compared to current    Similarity:  No change    Comparison to cardiac monitor: Yes    Rhythm:     Rhythm: sinus tachycardia    Ectopy:     Ectopy: none    QRS:     QRS axis:  Normal    QRS intervals:  Normal  Conduction:     Conduction: normal    ST segments:     ST segments:  Normal  T waves:     T waves: normal               ED Course  ED Course as of 04/26/22 4920 Tue Apr 26, 2022   2223 Patient holding the right side of her chest   Hurts when she takes deep breath or moves  Vital signs are stable with slight tachypnea likely due to pain and anxiety  Will give non necrotic analgesics and check chest x-ray  Labs are reassuring  2253 Patient feels very much better with the Lidoderm patch  She barely feels any discomfort on the right side but notices a little discomfort in the muscles around the left chest wall  Patient stable for discharge  MDM  Number of Diagnoses or Management Options  Chest wall pain: new and requires workup  Diagnosis management comments: Chest wall pain it is very reproducible and is anxious female  Will check labs, chest x-ray and EKG  Will avoid CT if labs are essentially normal since abdomen is not benign, is no fever or jaundice and patient has had allergic reaction to contrast medium  She has had CT scans recently for similar pain  Labs, x-ray and EKG are reassuring  Has mild transaminitis  This is not new  Patient admits to drinking alcohol frequently  She is attempting to stop  Patient had tremendous relief after Lidoderm patch placed  Instructed to follow-up with PCP  Amount and/or Complexity of Data Reviewed  Clinical lab tests: ordered and reviewed  Tests in the radiology section of CPT®: ordered and reviewed  Review and summarize past medical records: yes  Independent visualization of images, tracings, or specimens: yes        Disposition  Final diagnoses:   Chest wall pain     Time reflects when diagnosis was documented in both MDM as applicable and the Disposition within this note     Time User Action Codes Description Comment    4/26/2022 10:57 PM Alissa Campbell Add [R07 [id number]] Chest wall pain       ED Disposition     ED Disposition Condition Date/Time Comment    Discharge Stable Tue Apr 26, 2022 10:57 PM Shant Black discharge to home/self care              Follow-up Information     Follow up With Specialties Details Why Rey Bryan DO Internal Medicine, Family Medicine Call in 1 day For follow-up gerson  [address] [place] [address]  [phone]            Patient's Medications   Discharge Prescriptions    No medications on file       No discharge procedures on file      PDMP Review       Value Time User    PDMP Reviewed  Yes 6/10/2021 11:27 AM Lashell Rodriges DO          ED Provider  Electronically Signed by           Leslie Rojas DO  04/26/22 5914

## 2022-04-27 NOTE — DISCHARGE INSTRUCTIONS
Keep lidocaine patch on for up to 12 hours  After taking it off do not place a new one on for another 12 hours  You may by these over-the-counter  Take ibuprofen or Aleve if needed for pain  Let your doctor know you were here  The doctor can review tonight's results  Ask your doctor if a physical therapy referral would be warranted  Try to cut back on alcohol use  Discuss Vivitrol with your doctor

## 2022-04-28 ENCOUNTER — OFFICE VISIT (OUTPATIENT)
Dept: FAMILY MEDICINE CLINIC | Facility: HOSPITAL | Age: 37
End: 2022-04-28
Payer: COMMERCIAL

## 2022-04-28 VITALS
SYSTOLIC BLOOD PRESSURE: 170 MMHG | TEMPERATURE: 98.7 F | BODY MASS INDEX: 42.68 KG/M2 | DIASTOLIC BLOOD PRESSURE: 112 MMHG | HEIGHT: 64 IN | HEART RATE: 102 BPM | WEIGHT: 250 LBS

## 2022-04-28 DIAGNOSIS — F32.1 MODERATE MAJOR DEPRESSION, SINGLE EPISODE (HCC): ICD-10-CM

## 2022-04-28 DIAGNOSIS — F10.10 ETOH ABUSE: Primary | ICD-10-CM

## 2022-04-28 DIAGNOSIS — I10 ESSENTIAL HYPERTENSION: ICD-10-CM

## 2022-04-28 DIAGNOSIS — R10.9 RIGHT FLANK PAIN: ICD-10-CM

## 2022-04-28 DIAGNOSIS — R10.10 PAIN OF UPPER ABDOMEN: ICD-10-CM

## 2022-04-28 DIAGNOSIS — F41.1 GENERALIZED ANXIETY DISORDER: ICD-10-CM

## 2022-04-28 DIAGNOSIS — F32.1 CURRENT MODERATE EPISODE OF MAJOR DEPRESSIVE DISORDER, UNSPECIFIED WHETHER RECURRENT (HCC): ICD-10-CM

## 2022-04-28 DIAGNOSIS — E66.01 MORBID OBESITY WITH BMI OF 40.0-44.9, ADULT (HCC): ICD-10-CM

## 2022-04-28 PROCEDURE — 99215 OFFICE O/P EST HI 40 MIN: CPT | Performed by: INTERNAL MEDICINE

## 2022-04-28 PROCEDURE — 3008F BODY MASS INDEX DOCD: CPT | Performed by: INTERNAL MEDICINE

## 2022-04-28 PROCEDURE — 3725F SCREEN DEPRESSION PERFORMED: CPT | Performed by: INTERNAL MEDICINE

## 2022-04-28 RX ORDER — HYDROXYZINE PAMOATE 50 MG/1
CAPSULE ORAL
COMMUNITY
Start: 2022-03-16

## 2022-04-28 RX ORDER — GABAPENTIN 100 MG/1
100 CAPSULE ORAL 3 TIMES DAILY
Qty: 90 CAPSULE | Refills: 1 | Status: SHIPPED | OUTPATIENT
Start: 2022-04-28 | End: 2022-06-13

## 2022-04-28 RX ORDER — FAMOTIDINE 20 MG/1
20 TABLET, FILM COATED ORAL 2 TIMES DAILY
Qty: 60 TABLET | Refills: 2 | Status: SHIPPED | OUTPATIENT
Start: 2022-04-28 | End: 2022-06-13

## 2022-04-28 NOTE — ASSESSMENT & PLAN NOTE
Pt admits and knows her ETOH is a problem, she doesn't drink every day but has some shaking on days she doesn't - AST/T Morales elevated and plt low - needs to get into dual dx for her depression and anxiety - thankfully she is already established with [facility] and d/w pt that she needs to see them to address her ETOH and mood, will check RUQ US d/t pain and elevated LFT's, recent BW from ED reviewed

## 2022-04-28 NOTE — ASSESSMENT & PLAN NOTE
Chronic in nature and present for > 1 yr, has some old faded skin lesion at area ?zoster?, CT Abd was done in past w/o known acute abnormality, will check RUQ US and try trial of Gabapentin - SE reviewed, re-eval in 4-6 wks, call with SE/new/worse pain

## 2022-04-28 NOTE — PROGRESS NOTES
Assessment/Plan:    ETOH abuse  Pt admits and knows her ETOH is a problem, she doesn't drink every day but has some shaking on days she doesn't - AST/T Morales elevated and plt low - needs to get into dual dx for her depression and anxiety - thankfully she is already established with [facility] and d/w pt that she needs to see them to address her ETOH and mood, will check RUQ US d/t pain and elevated LFT's, recent BW from ED reviewed    Generalized anxiety disorder  On Effexor but question how she is taking it as I have not refilled since 2021 - she claims she is taking every day, deferring tx to [facility], are starting Gabapentin for chronic R flank pain and may help anxiety, has hydroxyzine for prn use, will follow    Current moderate episode of major depressive disorder ([facility] )  On Effexor but again question how she is taking it as not filled since 2021 - defer further tx to [facility] now she is established, call with any SI    Right flank pain  Chronic in nature and present for > 1 yr, has some old faded skin lesion at area ?zoster?, CT Abd was done in past w/o known acute abnormality, will check RUQ US and try trial of Gabapentin - SE reviewed, re-eval in 4-6 wks, call with SE/new/worse pain    Essential hypertension  BP very high today, pt visably anxious, will recheck in 4 wks, con't current meds for now, diet/exercise/wgt loss encouraged       Diagnoses and all orders for this visit:    ETOH abuse  -     Ambulatory Referral to Psychiatry; Future    Generalized anxiety disorder  -     Ambulatory Referral to Psychiatry; Future    Current moderate episode of major depressive disorder, unspecified whether recurrent ([facility] )  -     Ambulatory Referral to Psychiatry; Future    Right flank pain  -     gabapentin (Neurontin) 100 mg capsule;  Take 1 capsule (100 mg total) by mouth 3 (three) times a day    Pain of upper abdomen  Comments:  Tender on exam, check, RUQ US with ETOH history and try a trial of Pepcid bid, may be related to constipation - start MiraLax or Sennekot, encouraged high fiber diet and plenty of water and keep active, will follow  Orders:  -     US abdomen complete; Future  -     famotidine (PEPCID) 20 mg tablet; Take 1 tablet (20 mg total) by mouth 2 (two) times a day    Morbid obesity with BMI of 40 0-44 9, adult (HCC)    Moderate major depression, single episode (HCC)    Other orders  -     hydrOXYzine pamoate (VISTARIL) 50 mg capsule; TAKE 1 CAPSULE BY MOUTH EVERY DAY AS NEEDED NO MORE THAN 6 CAPSULES IN 24 HOURS        I have spent 40-45 minutes with Patient  today in which greater than 50% of this time was spent in counseling/coordination of care regarding Diagnostic results, Prognosis, Risks and benefits of tx options, Intructions for management, Patient and family education, Importance of tx compliance, Risk factor reductions and Impressions  Subjective:      Patient ID: Adolfo De Souza is a 30 y o  female  HPI Pt here for mult medical concerns and ED follow up    Pt admits she has had an issue with ETOH since COVID  She has been in for IP tx 3 times  She notes her family has voiced concerns about her kids  Her  went through her phone and "found something he didn't like"  He yanked her out of bed  She called her sister as she was upset with the event with her   Her sister called CPS  Pt states she met with CPS and was told she needed to see a psychiatrist and needs to see about medication tx for her ETOH  Pt admits her ETOH is a problem "obviously"  She has done therapy in her retreats and has done AA  She has established with [facility] and was told she needs a referral to psychiatry  She denies any other drug use  She was asked how her mood is and she states "very bad"  She is upset and hurt by her sister  She is talking to her  and feels he is a good support  She denies any physical or emotional abuse with her spouse    She is not speaking to her sister  She hasn't spoken to her father since she was a teen and her mother   She is not speaking to her best friend either  She feels down and sad but denies SI  She is anxious and tremulous  She is not sleeping well  She notes R lateral rib pain and R mid back pain for mos  She notes no known trauma/event/trigger when symptoms started mos  Pain is described as "tight"  The pain is constant and she feels if she passes gas/flatus pain is better  Pain is better with a few drinks  She notes pain is not specifically worse with eating but is worse with moving  She notes no radiation down LE  She notes no LE weakness/falls/loss of bowel or bladder control  She has had some constipation and took an OTC "laxative or something" but it did not do anything  She has occasional heart burn but has had more regurgitation of food  Denies black stools/blood in stools/difficulty swallowing  She notes decrease in appetite  She does not think she has lost weight but is down 22 lbs from 3/26/21  BMI reviewed  She admits "I was a chicken nugget kid"  She has a [place] but admits she is slacking, she does take her kids to the park regularly  PAP - overdue    Review of Systems   Constitutional: Positive for unexpected weight change  Negative for chills and fever  HENT: Negative for congestion and trouble swallowing  Eyes: Negative for pain and visual disturbance  Respiratory: Negative for cough and shortness of breath  Cardiovascular: Negative for chest pain and palpitations  Gastrointestinal: Positive for abdominal pain and constipation  Negative for blood in stool, diarrhea, nausea and vomiting  Genitourinary: Negative for difficulty urinating and dysuria  Musculoskeletal: Positive for back pain  Negative for neck pain  Skin: Negative for rash and wound  Neurological: Positive for numbness  Negative for dizziness, weakness, light-headedness and headaches  Hematological: Bruises/bleeds easily  Psychiatric/Behavioral: Positive for dysphoric mood  Negative for suicidal ideas  The patient is nervous/anxious  Objective:    BP (!) 170/112   Pulse 102   Temp 98 7 °F (37 1 °C) (Tympanic)   Ht 5' 4" (1 626 m)   Wt 113 kg (250 lb)   LMP 04/06/2022   BMI 42 91 kg/m²      Physical Exam  Vitals and nursing note reviewed  Constitutional:       General: She is not in acute distress  Appearance: She is well-developed  She is obese  She is not ill-appearing  HENT:      Head: Normocephalic and atraumatic  Eyes:      General:         Right eye: No discharge  Left eye: No discharge  Conjunctiva/sclera: Conjunctivae normal    Neck:      Trachea: No tracheal deviation  Cardiovascular:      Rate and Rhythm: Normal rate and regular rhythm  Heart sounds: Normal heart sounds  No murmur heard  No friction rub  Pulmonary:      Effort: Pulmonary effort is normal  No respiratory distress  Breath sounds: Normal breath sounds  No wheezing, rhonchi or rales  Abdominal:      General: There is no distension  Palpations: Abdomen is soft  Tenderness: There is abdominal tenderness  There is guarding  There is no rebound  Musculoskeletal:      Cervical back: Neck supple  Right lower leg: No edema  Left lower leg: No edema  Skin:     General: Skin is warm  Coloration: Skin is not pale  Findings: No erythema  Comments: Faded rash at R flank/midline where pain is, mult scattered macules - some scabbed (admits she is a )   Neurological:      General: No focal deficit present  Mental Status: She is alert  Motor: No abnormal muscle tone  Gait: Gait normal    Psychiatric:         Thought Content: Thought content normal          Judgment: Judgment normal       Comments: Shaking and tremulous, good eye contact, well kept, good insight         BMI Counseling: Body mass index is 42 91 kg/m²   The BMI is above normal  Nutrition recommendations include reducing portion sizes, 3-5 servings of fruits/vegetables daily, consuming healthier snacks, moderation in carbohydrate intake, increasing intake of lean protein and reducing intake of saturated fat and trans fat  Exercise recommendations include exercising 3-5 times per week  Depression Screening Follow-up Plan: Patient's depression screening was positive with a PHQ-2 score of 6  Their PHQ-9 score was 24  Continue regular follow-up with their psychologist/therapist/psychiatrist who is managing their mental health condition(s)

## 2022-04-28 NOTE — ASSESSMENT & PLAN NOTE
BP very high today, pt visably anxious, will recheck in 4 wks, con't current meds for now, diet/exercise/wgt loss encouraged

## 2022-04-28 NOTE — ASSESSMENT & PLAN NOTE
On Effexor but question how she is taking it as I have not refilled since 2021 - she claims she is taking every day, deferring tx to [facility], are starting Gabapentin for chronic R flank pain and may help anxiety, has hydroxyzine for prn use, will follow

## 2022-04-28 NOTE — PATIENT INSTRUCTIONS
Obesity   AMBULATORY CARE:   Obesity  means your body mass index (BMI) is greater than 30  Your healthcare provider will use your height and weight to measure your BMI  The risks of obesity include  many health problems, including injuries or physical disability  · Diabetes (high blood sugar level)    · High blood pressure or high cholesterol    · Heart disease    · Stroke    · Gallbladder or liver disease    · Cancer of the colon, breast, prostate, liver, or kidney    · Sleep apnea    · Arthritis or gout    Screening  is done to check for health conditions before you have signs or symptoms  If you are 28to 79years old, your blood sugar level may be checked every 3 years for signs of prediabetes or diabetes  Your healthcare provider will check your blood pressure at each visit  High blood pressure can lead to a stroke or other problems  Your provider may check for signs of heart disease, cancer, or other health problems  Seek care immediately if:   · You have a severe headache, confusion, or difficulty speaking  · You have weakness on one side of your body  · You have chest pain, sweating, or shortness of breath  Call your doctor if:   · You have symptoms of gallbladder or liver disease, such as pain in your upper abdomen  · You have knee or hip pain and discomfort while walking  · You have symptoms of diabetes, such as intense hunger and thirst, and frequent urination  · You have symptoms of sleep apnea, such as snoring or daytime sleepiness  · You have questions or concerns about your condition or care  Treatment for obesity  focuses on helping you lose weight to improve your health  Even a small decrease in BMI can reduce the risk for many health problems  Your healthcare provider will help you set a weight-loss goal   · Lifestyle changes  are the first step in treating obesity  These include making healthy food choices and getting regular physical activity   Your healthcare provider may suggest a weight-loss program that involves coaching, education, and therapy  · Medicine  may help you lose weight when it is used with a healthy foods and physical activity  · Surgery  can help you lose weight if you are very obese and have other health problems  There are several types of weight-loss surgery  Ask your healthcare provider for more information  Tips for safe weight loss:   · Set small, realistic goals  An example of a small goal is to walk for 20 minutes 5 days a week  Anther goal is to lose 5% of your body weight  · Tell friends, family members, and coworkers about your goals  and ask for their support  Ask a friend to lose weight with you, or join a weight-loss support group  · Identify foods or triggers that may cause you to overeat , and find ways to avoid them  Remove tempting high-calorie foods from your home and workplace  Place a bowl of fresh fruit on your kitchen counter  If stress causes you to eat, then find other ways to cope with stress  A counselor or therapist may be able to help you  · Keep a diary to track what you eat and drink  Also write down how many minutes of physical activity you do each day  Weigh yourself once a week and record it in your diary  Eating changes: You will need to eat 500 to 1,000 fewer calories each day than you currently eat to lose 1 to 2 pounds a week  The following changes will help you cut calories:  · Eat smaller portions  Use small plates, no larger than 9 inches in diameter  Fill your plate half full of fruits and vegetables  Measure your food using measuring cups until you know what a serving size looks like  · Eat 3 meals and 1 or 2 snacks each day  Plan your meals in advance  Alexia Dirk and eat at home most of the time  Eat slowly  Do not skip meals  Skipping meals can lead to overeating later in the day  This can make it harder for you to lose weight   Talk with a dietitian to help you make a meal plan and schedule that is right for you  · Eat fruits and vegetables at every meal   They are low in calories and high in fiber, which makes you feel full  Do not add butter, margarine, or cream sauce to vegetables  Use herbs to season steamed vegetables  · Eat less fat and fewer fried foods  Eat more baked or grilled chicken and fish  These protein sources are lower in calories and fat than red meat  Limit fast food  Dress your salads with olive oil and vinegar instead of bottled dressing  · Limit the amount of sugar you eat  Do not drink sugary beverages  Limit alcohol  Activity changes:  Physical activity is good for your body in many ways  It helps you burn calories and build strong muscles  It decreases stress and depression, and improves your mood  It can also help you sleep better  Talk to your healthcare provider before you begin an exercise program   · Exercise for at least 30 minutes 5 days a week  Start slowly  Set aside time each day for physical activity that you enjoy and that is convenient for you  It is best to do both weight training and an activity that increases your heart rate, such as walking, bicycling, or swimming  · Find ways to be more active  Do yard work and housecleaning  Walk up the stairs instead of using elevators  Spend your leisure time going to events that require walking, such as outdoor festivals or fairs  This extra physical activity can help you lose weight and keep it off  Follow up with your doctor as directed: You may need to meet with a dietitian  Write down your questions so you remember to ask them during your visits  © Copyright [employer] 2022 Information is for End User's use only and may not be sold, redistributed or otherwise used for commercial purposes  All illustrations and images included in CareNotes® are the copyrighted property of A D A M [employer] Inc  or Yvonne Castillo   The above information is an  only   It is not intended as medical advice for individual conditions or treatments  Talk to your doctor, nurse or pharmacist before following any medical regimen to see if it is safe and effective for you  Low Fat Diet   AMBULATORY CARE:   A low-fat diet  is an eating plan that is low in total fat, unhealthy fat, and cholesterol  You may need to follow a low-fat diet if you have trouble digesting or absorbing fat  You may also need to follow this diet if you have high cholesterol  You can also lower your cholesterol by increasing the amount of fiber in your diet  Soluble fiber is a type of fiber that helps to decrease cholesterol levels  Different types of fat in food:   · Limit unhealthy fats  A diet that is high in cholesterol, saturated fat, and trans fat may cause unhealthy cholesterol levels  Unhealthy cholesterol levels increase your risk of heart disease  ? Cholesterol:  Limit intake of cholesterol to less than 200 mg per day  Cholesterol is found in meat, eggs, and dairy  ? Saturated fat:  Limit saturated fat to less than 7% of your total daily calories  Ask your dietitian how many calories you need each day  Saturated fat is found in butter, cheese, ice cream, whole milk, and palm oil  Saturated fat is also found in meat, such as beef, pork, chicken skin, and processed meats  Processed meats include sausage, hot dogs, and bologna  ? Trans fat:  Avoid trans fat as much as possible  Trans fat is used in fried and baked foods  Foods that say trans fat free on the label may still have up to 0 5 grams of trans fat per serving  · Include healthy fats  Replace foods that are high in saturated and trans fat with foods high in healthy fats  This may help to decrease high cholesterol levels  ? Monounsaturated fats: These are found in avocados, nuts, and vegetable oils, such as olive, canola, and sunflower oil  ? Polyunsaturated fats: These can be found in vegetable oils, such as soybean or corn oil   Omega-3 fats can help to decrease the risk of heart disease  Omega-3 fats are found in fish, such as salmon, herring, trout, and tuna  Omega-3 fats can also be found in plant foods, such as walnuts, flaxseed, soybeans, and canola oil  Foods to limit or avoid:   · Grains:      ? Snacks that are made with partially hydrogenated oils, such as chips, regular crackers, and butter-flavored popcorn    ? High-fat baked goods, such as biscuits, croissants, doughnuts, pies, cookies, and pastries    · Dairy:      ? Whole milk, 2% milk, and yogurt and ice cream made with whole milk    ? Half and half creamer, heavy cream, and whipping cream    ? Cheese, cream cheese, and sour cream    · Meats and proteins:      ? High-fat cuts of meat (T-bone steak, regular hamburger, and ribs)    ? Fried meat, poultry (turkey and chicken), and fish    ? Poultry (chicken and turkey) with skin    ? Cold cuts (salami or bologna), hot dogs, middleton, and sausage    ? Whole eggs and egg yolks    · Vegetables and fruits with added fat:      ? Fried vegetables or vegetables in butter or high-fat sauces, such as cream or cheese sauces    ? Fried fruit or fruit served with butter or cream    · Fats:      ? Butter, stick margarine, and shortening    ? Coconut, palm oil, and palm kernel oil    Foods to include:   · Grains:      ? Whole-grain breads, cereals, pasta, and brown rice    ? Low-fat crackers and pretzels    · Vegetables and fruits:      ? Fresh, frozen, or canned vegetables (no salt or low-sodium)    ? Fresh, frozen, dried, or canned fruit (canned in light syrup or fruit juice)    ? Avocado    · Low-fat dairy products:      ? Nonfat (skim) or 1% milk    ? Nonfat or low-fat cheese, yogurt, and cottage cheese    · Meats and proteins:      ? Chicken or turkey with no skin    ? Baked or broiled fish    ? Lean beef and pork (loin, round, extra lean hamburger)    ? Beans and peas, unsalted nuts, soy products    ? Egg whites and substitutes    ?  Seeds and nuts    · Fats:      ? Unsaturated oil, such as canola, olive, peanut, soybean, or sunflower oil    ? Soft or liquid margarine and vegetable oil spread    ? Low-fat salad dressing    Other ways to decrease fat:   · Read food labels before you buy foods  Choose foods that have less than 30% of calories from fat  Choose low-fat or fat-free dairy products  Remember that fat free does not mean calorie free  These foods still contain calories, and too many calories can lead to weight gain  · Trim fat from meat and avoid fried food  Trim all visible fat from meat before you cook it  Remove the skin from poultry  Do not hall meat, fish, or poultry  Bake, roast, boil, or broil these foods instead  Avoid fried foods  Eat a baked potato instead of [place] fries  Steam vegetables instead of sautéing them in butter  · Add less fat to foods  Use imitation middleton bits on salads and baked potatoes instead of regular middleton bits  Use fat-free or low-fat salad dressings instead of regular dressings  Use low-fat or nonfat butter-flavored topping instead of regular butter or margarine on popcorn and other foods  Ways to decrease fat in recipes:  Replace high-fat ingredients with low-fat or nonfat ones  This may cause baked goods to be drier than usual  You may need to use nonfat cooking spray on pans to prevent food from sticking  You also may need to change the amount of other ingredients, such as water, in the recipe  Try the following:  · Use low-fat or light margarine instead of regular margarine or shortening  · Use lean ground turkey breast or chicken, or lean ground beef (less than 5% fat) instead of hamburger  · Add 1 teaspoon of canola oil to 8 ounces of skim milk instead of using cream or half and half  · Use grated zucchini, carrots, or apples in breads instead of coconut  · Use blenderized, low-fat cottage cheese, plain tofu, or low-fat ricotta cheese instead of cream cheese       · Use 1 egg white and 1 teaspoon of canola oil, or use ¼ cup (2 ounces) of fat-free egg substitute instead of a whole egg  · Replace half of the oil that is called for in a recipe with applesauce when you bake  Use 3 tablespoons of cocoa powder and 1 tablespoon of canola oil instead of a square of baking chocolate  How to increase fiber:  Eat enough high-fiber foods to get 20 to 30 grams of fiber every day  Slowly increase your fiber intake to avoid stomach cramps, gas, and other problems  · Eat 3 ounces of whole-grain foods each day  An ounce is about 1 slice of bread  Eat whole-grain breads, such as whole-wheat bread  Whole wheat, whole-wheat flour, or other whole grains should be listed as the first ingredient on the food label  Replace white flour with whole-grain flour or use half of each in recipes  Whole-grain flour is heavier than white flour, so you may have to add more yeast or baking powder  · Eat a high-fiber cereal for breakfast   Oatmeal is a good source of soluble fiber  Look for cereals that have bran or fiber in the name  Choose whole-grain products, such as brown rice, barley, and whole-wheat pasta  · Eat more beans, peas, and lentils  For example, add beans to soups or salads  Eat at least 5 cups of fruits and vegetables each day  Eat fruits and vegetables with the peel because the peel is high in fiber  © Copyright [employer] 2022 Information is for End User's use only and may not be sold, redistributed or otherwise used for commercial purposes  All illustrations and images included in CareNotes® are the copyrighted property of [employer]  or [address] [place], [place] [address]barry Castillo   The above information is an  only  It is not intended as medical advice for individual conditions or treatments  Talk to your doctor, nurse or pharmacist before following any medical regimen to see if it is safe and effective for you      Depression   AMBULATORY CARE:   Depression  is a medical condition that causes feelings of sadness or hopelessness that do not go away  Depression may cause you to lose interest in things you used to enjoy  These feelings may interfere with your daily life  Common symptoms include the following:   · Appetite changes, or weight gain or loss    · Trouble going to sleep or staying asleep, or sleeping too much    · Fatigue or lack of energy    · Feeling restless, irritable, or withdrawn    · Feeling worthless, hopeless, discouraged, or guilty    · Trouble concentrating, remembering things, doing daily tasks, or making decisions    · Thoughts about hurting or killing yourself    Call your local emergency number (911 in the [address] [place] [address] [address]) if:   · You think about harming yourself or someone else  · You have done something on purpose to hurt yourself  Call your therapist or doctor if:   · Your symptoms do not improve  · You cannot make it to your next appointment  · You have new symptoms  · You have questions or concerns about your condition or care  The following resources are available at any time to help you, if needed:   · [facility]: 6-954.374.1485 (0-172-566-GFDM)     · Suicide Hotline: 0-733.288.1293 (7-902-KSRFZPH)     · For a list of international numbers: https://save org/find-help/international-resources/    Treatment for depression  may include medicine to relieve depression  Medicine is often used together with therapy  Therapy is a way for you to talk about your feelings and anything that may be causing depression  Therapy can be done alone or in a group  It may also be done with family members or a significant other  Self-care:   · Get regular physical activity  Try to be active for 30 minutes, 3 to 5 days a week  Physical activity can help relieve depression  Work with your healthcare provider to develop a plan that you enjoy  It may help to ask someone to be active with you  · Create a regular sleep schedule    A routine can help you relax before bed  Listen to music, read, or do yoga  Try to go to bed and wake up at the same time every day  Sleep is important for emotional health  · Eat a variety of healthy foods  Healthy foods include fruits, vegetables, whole-grain breads, low-fat dairy products, lean meats, fish, and cooked beans  A healthy meal plan is low in fat, salt, and added sugar  · Do not drink alcohol or use drugs  Alcohol and drugs can make depression worse  Talk to your therapist or doctor if you need help quitting  Follow up with your healthcare provider as directed: Your healthcare provider will monitor your progress at follow-up visits  He or she will also monitor your medicine if you take antidepressants  Your healthcare provider will ask if the medicine is helping  Tell him or her about any side effects or problems you may have with your medicine  The type or amount of medicine may need to be changed  Write down your questions so you remember to ask them during your visits  © Copyright [employer] 2022 Information is for End User's use only and may not be sold, redistributed or otherwise used for commercial purposes  All illustrations and images included in CareNotes® are the copyrighted property of [employer]  or [address] [place], [place] [address]   The above information is an  only  It is not intended as medical advice for individual conditions or treatments  Talk to your doctor, nurse or pharmacist before following any medical regimen to see if it is safe and effective for you

## 2022-04-28 NOTE — ASSESSMENT & PLAN NOTE
On Effexor but again question how she is taking it as not filled since 2021 - defer further tx to [facility] now she is established, call with any SI

## 2022-04-29 ENCOUNTER — TELEPHONE (OUTPATIENT)
Dept: PSYCHIATRY | Facility: CLINIC | Age: 37
End: 2022-04-29

## 2022-04-29 ENCOUNTER — TELEPHONE (OUTPATIENT)
Dept: FAMILY MEDICINE CLINIC | Facility: HOSPITAL | Age: 37
End: 2022-04-29

## 2022-04-29 NOTE — TELEPHONE ENCOUNTER
Would recommend she con't the medication but watch closely    If she has any SOB/tongue swelling/wheezing/hives/rash then stop the medication

## 2022-04-29 NOTE — TELEPHONE ENCOUNTER
Pt called in - started on new medications and she feels out of it - lips tingling   Please advise - she does have an epi pen

## 2022-04-29 NOTE — TELEPHONE ENCOUNTER
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

## 2022-05-03 ENCOUNTER — NURSE TRIAGE (OUTPATIENT)
Dept: OTHER | Facility: OTHER | Age: 37
End: 2022-05-03

## 2022-05-03 NOTE — TELEPHONE ENCOUNTER
Regarding: med reaction/ gabapentin   ----- Message from Nelson Crandall sent at 5/3/2022  6:27 PM EDT -----  " I was just prescribed gabapentin and I do not feel ok on it, I feel like a zombie and too sleepy all the time   I cant take care of my kids like this "

## 2022-05-03 NOTE — TELEPHONE ENCOUNTER
Reason for Disposition   [1] Caller has URGENT medicine question about med that PCP or specialist prescribed AND [2] triager unable to answer question    Answer Assessment - Initial Assessment Questions  1  NAME of MEDICATION: "What medicine are you calling about?"      Gabapentin  100 mg   2  QUESTION: "What is your question?" (e g , medication refill, side effect)      I feel so tired on it  Im to exhausted to take care of my kids the way I want to  3  PRESCRIBING HCP: "Who prescribed it?" Reason: if prescribed by specialist, call should be referred to that group  Dr Luis Soares  4  SYMPTOMS: "Do you have any symptoms?"      Treating for substance abuse  5  SEVERITY: If symptoms are present, ask "Are they mild, moderate or severe?"      Pt feels to fatigue to do her normal daily activities  She has only been able to take 100 mg a day and still is very tired/  6   PREGNANCY:  "Is there any chance that you are pregnant?" "When was your last menstrual period?"      no    Protocols used: MEDICATION QUESTION CALL-ADULT-

## 2022-06-02 ENCOUNTER — TELEPHONE (OUTPATIENT)
Dept: FAMILY MEDICINE CLINIC | Facility: HOSPITAL | Age: 37
End: 2022-06-02

## 2022-06-02 ENCOUNTER — HOSPITAL ENCOUNTER (INPATIENT)
Facility: HOSPITAL | Age: 37
LOS: 2 days | Discharge: LEFT BEFORE DISCHARGE | DRG: 644 | End: 2022-06-04
Attending: EMERGENCY MEDICINE | Admitting: STUDENT IN AN ORGANIZED HEALTH CARE EDUCATION/TRAINING PROGRAM
Payer: COMMERCIAL

## 2022-06-02 DIAGNOSIS — F10.20 ALCOHOLISM (HCC): ICD-10-CM

## 2022-06-02 DIAGNOSIS — F10.231 ALCOHOL WITHDRAWAL DELIRIUM, ACUTE, HYPERACTIVE (HCC): ICD-10-CM

## 2022-06-02 DIAGNOSIS — R07.89 RIGHT-SIDED CHEST WALL PAIN: ICD-10-CM

## 2022-06-02 DIAGNOSIS — H53.2 DIPLOPIA: ICD-10-CM

## 2022-06-02 DIAGNOSIS — H53.9 VISUAL CHANGES: Primary | ICD-10-CM

## 2022-06-02 DIAGNOSIS — R10.9 RIGHT FLANK PAIN: ICD-10-CM

## 2022-06-02 DIAGNOSIS — F10.10 ETOH ABUSE: ICD-10-CM

## 2022-06-02 DIAGNOSIS — F41.9 ANXIETY: ICD-10-CM

## 2022-06-02 LAB
ALBUMIN SERPL BCP-MCNC: 3.9 G/DL (ref 3.5–5)
ALP SERPL-CCNC: 81 U/L (ref 46–116)
ALT SERPL W P-5'-P-CCNC: 106 U/L (ref 12–78)
AMPHETAMINES SERPL QL SCN: NEGATIVE
ANION GAP SERPL CALCULATED.3IONS-SCNC: 14 MMOL/L (ref 4–13)
AST SERPL W P-5'-P-CCNC: 212 U/L (ref 5–45)
ATRIAL RATE: 98 BPM
BACTERIA UR QL AUTO: NORMAL /HPF
BARBITURATES UR QL: NEGATIVE
BASOPHILS # BLD AUTO: 0.07 THOUSANDS/ΜL (ref 0–0.1)
BASOPHILS NFR BLD AUTO: 1 % (ref 0–1)
BENZODIAZ UR QL: NEGATIVE
BILIRUB SERPL-MCNC: 0.5 MG/DL (ref 0.2–1)
BILIRUB UR QL STRIP: NEGATIVE
BUN SERPL-MCNC: 10 MG/DL (ref 5–25)
CALCIUM SERPL-MCNC: 8.6 MG/DL (ref 8.3–10.1)
CHLORIDE SERPL-SCNC: 101 MMOL/L (ref 100–108)
CLARITY UR: CLEAR
CO2 SERPL-SCNC: 23 MMOL/L (ref 21–32)
COCAINE UR QL: NEGATIVE
COLOR UR: YELLOW
CREAT SERPL-MCNC: 0.67 MG/DL (ref 0.6–1.3)
CRP SERPL QL: <0.5 MG/L
EOSINOPHIL # BLD AUTO: 0.13 THOUSAND/ΜL (ref 0–0.61)
EOSINOPHIL NFR BLD AUTO: 3 % (ref 0–6)
ERYTHROCYTE [DISTWIDTH] IN BLOOD BY AUTOMATED COUNT: 16.2 % (ref 11.6–15.1)
ERYTHROCYTE [SEDIMENTATION RATE] IN BLOOD: 16 MM/HOUR (ref 0–19)
ETHANOL SERPL-MCNC: 327 MG/DL (ref 0–3)
EXT PREG TEST URINE: NEGATIVE
EXT. CONTROL ED NAV: NORMAL
GFR SERPL CREATININE-BSD FRML MDRD: 114 ML/MIN/1.73SQ M
GLUCOSE SERPL-MCNC: 82 MG/DL (ref 65–140)
GLUCOSE UR STRIP-MCNC: NEGATIVE MG/DL
HCT VFR BLD AUTO: 44.6 % (ref 34.8–46.1)
HGB BLD-MCNC: 14.9 G/DL (ref 11.5–15.4)
HGB UR QL STRIP.AUTO: NEGATIVE
IMM GRANULOCYTES # BLD AUTO: 0.01 THOUSAND/UL (ref 0–0.2)
IMM GRANULOCYTES NFR BLD AUTO: 0 % (ref 0–2)
KETONES UR STRIP-MCNC: NEGATIVE MG/DL
LEUKOCYTE ESTERASE UR QL STRIP: NEGATIVE
LIPASE SERPL-CCNC: 200 U/L (ref 73–393)
LYMPHOCYTES # BLD AUTO: 1.95 THOUSANDS/ΜL (ref 0.6–4.47)
LYMPHOCYTES NFR BLD AUTO: 37 % (ref 14–44)
MCH RBC QN AUTO: 31.6 PG (ref 26.8–34.3)
MCHC RBC AUTO-ENTMCNC: 33.4 G/DL (ref 31.4–37.4)
MCV RBC AUTO: 95 FL (ref 82–98)
METHADONE UR QL: NEGATIVE
MONOCYTES # BLD AUTO: 0.5 THOUSAND/ΜL (ref 0.17–1.22)
MONOCYTES NFR BLD AUTO: 10 % (ref 4–12)
MUCOUS THREADS UR QL AUTO: NORMAL
NEUTROPHILS # BLD AUTO: 2.56 THOUSANDS/ΜL (ref 1.85–7.62)
NEUTS SEG NFR BLD AUTO: 49 % (ref 43–75)
NITRITE UR QL STRIP: NEGATIVE
NON-SQ EPI CELLS URNS QL MICRO: NORMAL /HPF
NRBC BLD AUTO-RTO: 0 /100 WBCS
OPIATES UR QL SCN: NEGATIVE
OXYCODONE+OXYMORPHONE UR QL SCN: NEGATIVE
P AXIS: 73 DEGREES
PCP UR QL: NEGATIVE
PH UR STRIP.AUTO: 6 [PH]
PLATELET # BLD AUTO: 125 THOUSANDS/UL (ref 149–390)
PMV BLD AUTO: 10.6 FL (ref 8.9–12.7)
POTASSIUM SERPL-SCNC: 4 MMOL/L (ref 3.5–5.3)
PR INTERVAL: 144 MS
PROT SERPL-MCNC: 7.6 G/DL (ref 6.4–8.2)
PROT UR STRIP-MCNC: ABNORMAL MG/DL
QRS AXIS: 79 DEGREES
QRSD INTERVAL: 76 MS
QT INTERVAL: 346 MS
QTC INTERVAL: 441 MS
RBC # BLD AUTO: 4.72 MILLION/UL (ref 3.81–5.12)
RBC #/AREA URNS AUTO: NORMAL /HPF
SODIUM SERPL-SCNC: 138 MMOL/L (ref 136–145)
SP GR UR STRIP.AUTO: <=1.005 (ref 1–1.03)
T WAVE AXIS: 59 DEGREES
THC UR QL: POSITIVE
TSH SERPL DL<=0.05 MIU/L-ACNC: 4.63 UIU/ML (ref 0.45–4.5)
UROBILINOGEN UR QL STRIP.AUTO: 0.2 E.U./DL
VENTRICULAR RATE: 98 BPM
WBC # BLD AUTO: 5.22 THOUSAND/UL (ref 4.31–10.16)
WBC #/AREA URNS AUTO: NORMAL /HPF

## 2022-06-02 PROCEDURE — 81001 URINALYSIS AUTO W/SCOPE: CPT | Performed by: EMERGENCY MEDICINE

## 2022-06-02 PROCEDURE — 84425 ASSAY OF VITAMIN B-1: CPT | Performed by: PHYSICIAN ASSISTANT

## 2022-06-02 PROCEDURE — 80307 DRUG TEST PRSMV CHEM ANLYZR: CPT | Performed by: EMERGENCY MEDICINE

## 2022-06-02 PROCEDURE — 85652 RBC SED RATE AUTOMATED: CPT | Performed by: EMERGENCY MEDICINE

## 2022-06-02 PROCEDURE — 96360 HYDRATION IV INFUSION INIT: CPT

## 2022-06-02 PROCEDURE — 84443 ASSAY THYROID STIM HORMONE: CPT | Performed by: PHYSICIAN ASSISTANT

## 2022-06-02 PROCEDURE — 84439 ASSAY OF FREE THYROXINE: CPT | Performed by: PHYSICIAN ASSISTANT

## 2022-06-02 PROCEDURE — 80053 COMPREHEN METABOLIC PANEL: CPT | Performed by: EMERGENCY MEDICINE

## 2022-06-02 PROCEDURE — 83918 ORGANIC ACIDS TOTAL QUANT: CPT | Performed by: PHYSICIAN ASSISTANT

## 2022-06-02 PROCEDURE — 99285 EMERGENCY DEPT VISIT HI MDM: CPT

## 2022-06-02 PROCEDURE — 86618 LYME DISEASE ANTIBODY: CPT | Performed by: EMERGENCY MEDICINE

## 2022-06-02 PROCEDURE — 93010 ELECTROCARDIOGRAM REPORT: CPT | Performed by: INTERNAL MEDICINE

## 2022-06-02 PROCEDURE — 82077 ASSAY SPEC XCP UR&BREATH IA: CPT | Performed by: EMERGENCY MEDICINE

## 2022-06-02 PROCEDURE — 99223 1ST HOSP IP/OBS HIGH 75: CPT | Performed by: STUDENT IN AN ORGANIZED HEALTH CARE EDUCATION/TRAINING PROGRAM

## 2022-06-02 PROCEDURE — 36415 COLL VENOUS BLD VENIPUNCTURE: CPT | Performed by: EMERGENCY MEDICINE

## 2022-06-02 PROCEDURE — 86140 C-REACTIVE PROTEIN: CPT | Performed by: EMERGENCY MEDICINE

## 2022-06-02 PROCEDURE — 82607 VITAMIN B-12: CPT | Performed by: PHYSICIAN ASSISTANT

## 2022-06-02 PROCEDURE — 81025 URINE PREGNANCY TEST: CPT | Performed by: EMERGENCY MEDICINE

## 2022-06-02 PROCEDURE — 83690 ASSAY OF LIPASE: CPT | Performed by: EMERGENCY MEDICINE

## 2022-06-02 PROCEDURE — 96361 HYDRATE IV INFUSION ADD-ON: CPT

## 2022-06-02 PROCEDURE — 99285 EMERGENCY DEPT VISIT HI MDM: CPT | Performed by: EMERGENCY MEDICINE

## 2022-06-02 PROCEDURE — 85025 COMPLETE CBC W/AUTO DIFF WBC: CPT | Performed by: EMERGENCY MEDICINE

## 2022-06-02 PROCEDURE — 93005 ELECTROCARDIOGRAM TRACING: CPT

## 2022-06-02 PROCEDURE — 87636 SARSCOV2 & INF A&B AMP PRB: CPT | Performed by: EMERGENCY MEDICINE

## 2022-06-02 RX ORDER — CHLORDIAZEPOXIDE HYDROCHLORIDE 5 MG/1
10 CAPSULE, GELATIN COATED ORAL EVERY 8 HOURS SCHEDULED
Status: DISCONTINUED | OUTPATIENT
Start: 2022-06-02 | End: 2022-06-03

## 2022-06-02 RX ORDER — LANOLIN ALCOHOL/MO/W.PET/CERES
100 CREAM (GRAM) TOPICAL DAILY
Status: DISCONTINUED | OUTPATIENT
Start: 2022-06-02 | End: 2022-06-03

## 2022-06-02 RX ORDER — LANOLIN ALCOHOL/MO/W.PET/CERES
400 CREAM (GRAM) TOPICAL DAILY
Status: DISCONTINUED | OUTPATIENT
Start: 2022-06-02 | End: 2022-06-04

## 2022-06-02 RX ORDER — NICOTINE 21 MG/24HR
1 PATCH, TRANSDERMAL 24 HOURS TRANSDERMAL DAILY
Status: DISCONTINUED | OUTPATIENT
Start: 2022-06-03 | End: 2022-06-04 | Stop reason: HOSPADM

## 2022-06-02 RX ORDER — SODIUM CHLORIDE 9 MG/ML
125 INJECTION, SOLUTION INTRAVENOUS CONTINUOUS
Status: DISCONTINUED | OUTPATIENT
Start: 2022-06-02 | End: 2022-06-03

## 2022-06-02 RX ORDER — TETRACAINE HYDROCHLORIDE 5 MG/ML
2 SOLUTION OPHTHALMIC ONCE
Status: COMPLETED | OUTPATIENT
Start: 2022-06-02 | End: 2022-06-02

## 2022-06-02 RX ORDER — ONDANSETRON 2 MG/ML
4 INJECTION INTRAMUSCULAR; INTRAVENOUS EVERY 4 HOURS PRN
Status: DISCONTINUED | OUTPATIENT
Start: 2022-06-02 | End: 2022-06-04 | Stop reason: HOSPADM

## 2022-06-02 RX ORDER — LEVOTHYROXINE SODIUM 0.03 MG/1
50 TABLET ORAL DAILY
Status: DISCONTINUED | OUTPATIENT
Start: 2022-06-03 | End: 2022-06-04 | Stop reason: HOSPADM

## 2022-06-02 RX ORDER — MAGNESIUM HYDROXIDE/ALUMINUM HYDROXICE/SIMETHICONE 120; 1200; 1200 MG/30ML; MG/30ML; MG/30ML
30 SUSPENSION ORAL EVERY 6 HOURS PRN
Status: DISCONTINUED | OUTPATIENT
Start: 2022-06-02 | End: 2022-06-04 | Stop reason: HOSPADM

## 2022-06-02 RX ORDER — FAMOTIDINE 20 MG/1
20 TABLET, FILM COATED ORAL 2 TIMES DAILY
Status: DISCONTINUED | OUTPATIENT
Start: 2022-06-02 | End: 2022-06-04 | Stop reason: HOSPADM

## 2022-06-02 RX ORDER — SUCRALFATE 1 G/1
1 TABLET ORAL
Status: DISCONTINUED | OUTPATIENT
Start: 2022-06-02 | End: 2022-06-04 | Stop reason: HOSPADM

## 2022-06-02 RX ORDER — ACETAMINOPHEN 325 MG/1
650 TABLET ORAL EVERY 6 HOURS PRN
Status: DISCONTINUED | OUTPATIENT
Start: 2022-06-02 | End: 2022-06-04 | Stop reason: HOSPADM

## 2022-06-02 RX ORDER — BISOPROLOL FUMARATE 5 MG/1
15 TABLET ORAL DAILY
Status: DISCONTINUED | OUTPATIENT
Start: 2022-06-03 | End: 2022-06-04 | Stop reason: HOSPADM

## 2022-06-02 RX ORDER — VENLAFAXINE HYDROCHLORIDE 150 MG/1
150 CAPSULE, EXTENDED RELEASE ORAL DAILY
Status: DISCONTINUED | OUTPATIENT
Start: 2022-06-03 | End: 2022-06-03

## 2022-06-02 RX ADMIN — TETRACAINE HYDROCHLORIDE 2 DROP: 5 SOLUTION OPHTHALMIC at 18:05

## 2022-06-02 RX ADMIN — SUCRALFATE 1 G: 1 TABLET ORAL at 22:12

## 2022-06-02 RX ADMIN — Medication 100 MG: at 22:12

## 2022-06-02 RX ADMIN — SODIUM CHLORIDE 1000 ML: 0.9 INJECTION, SOLUTION INTRAVENOUS at 16:24

## 2022-06-02 RX ADMIN — SODIUM CHLORIDE 125 ML/HR: 0.9 INJECTION, SOLUTION INTRAVENOUS at 22:13

## 2022-06-02 RX ADMIN — FOLIC ACID TAB 400 MCG 400 MCG: 400 TAB at 22:12

## 2022-06-02 RX ADMIN — B-COMPLEX W/ C & FOLIC ACID TAB 1 TABLET: TAB at 22:12

## 2022-06-02 RX ADMIN — CHLORDIAZEPOXIDE HYDROCHLORIDE 10 MG: 5 CAPSULE ORAL at 22:12

## 2022-06-02 RX ADMIN — SODIUM CHLORIDE 1000 MG: 0.9 INJECTION, SOLUTION INTRAVENOUS at 22:13

## 2022-06-02 RX ADMIN — ONDANSETRON 4 MG: 2 INJECTION INTRAMUSCULAR; INTRAVENOUS at 22:12

## 2022-06-02 RX ADMIN — FAMOTIDINE 20 MG: 20 TABLET ORAL at 22:12

## 2022-06-02 NOTE — ASSESSMENT & PLAN NOTE
On bisoprolol 15 mg, reports she has not been taking it in a long time  BP mildly elevated 150/90  Will restart bisoprolol

## 2022-06-02 NOTE — ASSESSMENT & PLAN NOTE
Patient reports horizontal diplopia and blurry vision her left eye x3 days  Denies eye pain  She is concerned about possible MS flare as her mother had MS  Recent visit to the ophthalmologist who adjusted her prescription, patient reports that he did not note any abnormalities though  · Vital signs are stable, mildly hypertensive  · Neuro exam is benign  She does have some hand tremors bilaterally which she states is not new  PERRLA  · She is a daily drinker, concern for possible malabsorption/vitamin deficiency  · She reports not taking her medications including levothyroxine  · CRP and ESR are WNL    Suspect hypothyroidism versus vitamin deficiency  Lower suspicion for MS flare given normal inflammatory markers  Plan:  - will check vitamin B1, B12, methylmalonic acid  Start vitamin supplementation  - will check TSH with reflex T4  Restart levothyroxine  - neurology consult    Recommended to give 1000 mg IV Solu-Medrol and MRI brain/orbits for optic neuritis rule out

## 2022-06-02 NOTE — ASSESSMENT & PLAN NOTE
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

## 2022-06-02 NOTE — TELEPHONE ENCOUNTER
Patient called wanting to let us know that she does not feel right  States she has side pain and feels very nauseous  Described her fitzgerald with alcohol, and states her  is taking her to the ER currently  Wanted to call and let us know

## 2022-06-02 NOTE — H&P
[facility], formerly [facility] Kesha 1985, 30 y o  female MRN: [id number]  Unit/Bed#: -01 Encounter: [id number]  Primary Care Provider: Raul Chambers DO   Date and time admitted to hospital: 6/2/2022  3:20 PM    * Diplopia  Assessment & Plan  Patient reports horizontal diplopia and blurry vision her left eye x3 days  Denies eye pain  She is concerned about possible MS flare as her mother had MS  Recent visit to the ophthalmologist who adjusted her prescription, patient reports that he did not note any abnormalities though  · Vital signs are stable, mildly hypertensive  · Neuro exam is benign  She does have some hand tremors bilaterally which she states is not new  PERRLA  · She is a daily drinker, concern for possible malabsorption/vitamin deficiency  · She reports not taking her medications including levothyroxine  · CRP and ESR are WNL    Suspect hypothyroidism versus vitamin deficiency  Lower suspicion for MS flare given normal inflammatory markers  Plan:  - will check vitamin B1, B12, methylmalonic acid  Start vitamin supplementation  - will check TSH with reflex T4  Restart levothyroxine  - neurology consult  Recommended to give 1000 mg IV Solu-Medrol and MRI brain/orbits for optic neuritis rule out    Right flank pain  Assessment & Plan  Reports chronic right-sided pain since 2019  Also reports nausea/vomiting x3 days, has been unable to tolerate any p o  Intake  CT abdomen/pelvis in the past have only shown hepatic steatosis  · Of note, patient has anaphylactic allergy to contrast dye  On exam, does have tenderness on palpation of RUQ  In the setting of elevated LFTs and vomiting, will order RUQ ultrasound  GI consult  Appreciate recs      ETOH abuse  Assessment & Plan  Reports drinking 3-5 shots per day  Prior to April, was drinking more heavily but has cut back    Ethanol level today 337, reports drinking heavily over the holiday weekend and this week  She has interest in quitting/cutting further back, but does not wish for detox  Will place on CIWA  Start Librium 10 mg q 8 H  IVF and vitamin supplementation      Essential hypertension  Assessment & Plan  On bisoprolol 15 mg, reports she has not been taking it in a long time  BP mildly elevated 150/90  Will restart bisoprolol    Hypothyroidism  Assessment & Plan  On levothyroxine 50mcg, reports she has not been taking this in some time  Will check TSH and T4    VTE Pharmacologic Prophylaxis:   Low Risk (Score 0-2) - Encourage Ambulation  Code Status: Level 1 - Full Code   Discussion with family: Patient to contact family  Anticipated Length of Stay: Patient will be admitted on an inpatient basis with an anticipated length of stay of greater than 2 midnights secondary to Optic neuritis rule out  Total Time for Visit, including Counseling / Coordination of Care: 45 minutes Greater than 50% of this total time spent on direct patient counseling and coordination of care  Chief Complaint:  Diplopia and abdominal pain    History of Present Illness:  Raheem Hinojosa is a 30 y o  female with a PMH of alcoholism, hypothyroidism, depression who presents with right flank pain  She initially presented with right flank pain which she states is chronic since 2019 but has been worse as of late  She does endorse nausea and vomiting x3 days and inability to tolerate p o  Intake  She reports that she is a daily drinker and has cut back since April, however has been drinking a little heavier over the holiday weekend this past week  She is not interested in rehab or detox, she wishes to quit on her own  While in the emergency department, she reported vision changes such as blurry vision and horizontal diplopia in the left eye  She expressed concern for possible MS as her mother suffered from severe MS  She does report that she has not been taking her medications including levothyroxine    Her case was discussed with Neurology who recommended admission with IV steroids and MRI brain/orbits for optic neuritis rule out  Review of Systems:  Review of Systems   Constitutional: Positive for appetite change ( unable to tolerate p o  intake)  Negative for chills, fatigue and fever  HENT: Negative for ear pain, sore throat and trouble swallowing  Eyes: Positive for visual disturbance  Respiratory: Negative for cough, chest tightness, shortness of breath and wheezing  Cardiovascular: Negative for chest pain, palpitations and leg swelling  Gastrointestinal: Positive for abdominal pain ( RUQ), nausea and vomiting  Negative for abdominal distention and diarrhea  Endocrine: Negative  Genitourinary: Positive for flank pain (Right)  Negative for dysuria and hematuria  Musculoskeletal: Negative for arthralgias, gait problem and myalgias  Skin: Negative for pallor  Allergic/Immunologic: Negative for immunocompromised state  Neurological: Negative for dizziness, syncope, light-headedness, numbness and headaches  Past Medical and Surgical History:   Past Medical History:   Diagnosis Date    ETOH abuse     Insulin controlled gestational diabetes mellitus (GDM) during pregnancy, antepartum 2019    Metformin dinner/HS insulin  Last Assessment & Plan:  BG log reviewed today:- previous review on Friday with recommendation for increasing HS insulin  Fastings: high normal 1 hour PP: within the desired range   Discussed rationale and recommendation to change current medical therapy as listed:   Bedtime: 18 units Levemir  Continue same dose of Metformin, 1000 mg at dinner    Denies signs and sympto    Obesity        Past Surgical History:   Procedure Laterality Date     SECTION  2018     SECTION  10/20/2017    DILATION AND CURETTAGE OF UTERUS      MASTOIDECTOMY  2013    MASTOIDECTOMY Right     WISDOM TOOTH EXTRACTION         Meds/Allergies:  Prior to Admission medications Medication Sig Start Date End Date Taking? Authorizing Provider   bisoprolol (ZEBETA) 10 MG tablet 1 and 1/2 tab PO q day 6/10/21   Malena Watkins DO   famotidine (PEPCID) 20 mg tablet Take 1 tablet (20 mg total) by mouth 2 (two) times a day 22   Malena Watkins DO   fluticasone (FLONASE) 50 mcg/act nasal spray 1 spray into each nostril daily    Historical Provider, MD   gabapentin (Neurontin) 100 mg capsule Take 1 capsule (100 mg total) by mouth 3 (three) times a day 22   Malena Watkins DO   hydrOXYzine pamoate (VISTARIL) 50 mg capsule TAKE 1 CAPSULE BY MOUTH EVERY DAY AS NEEDED NO MORE THAN 6 CAPSULES IN 24 HOURS 3/16/22   Historical Provider, MD   levothyroxine 50 mcg tablet Take 1 tablet (50 mcg total) by mouth daily 6/10/21   Malena Watkins DO   Multiple Vitamins-Minerals (MULTIVITAMIN ADULT PO) Take 1 tablet by mouth daily  Patient not taking: Reported on 2022     Historical Provider, MD   venlafaxine (EFFEXOR-XR) 150 mg 24 hr capsule Take 1 capsule (150 mg total) by mouth daily 6/10/21   Malena Watkins DO     I have reviewed home medications with patient personally  Allergies: Allergies   Allergen Reactions    Contrast [Iodinated Diagnostic Agents] Anaphylaxis       Social History:  Marital Status: /Civil Union   Occupation:  Noncontributory  Patient Pre-hospital Living Situation: Home  Patient Pre-hospital Level of Mobility: walks  Patient Pre-hospital Diet Restrictions:  None  Substance Use History:   Social History     Substance and Sexual Activity   Alcohol Use Yes     Social History     Tobacco Use   Smoking Status Current Every Day Smoker    Packs/day: 0 50    Types: Cigarettes    Last attempt to quit: 2017    Years since quittin 4   Smokeless Tobacco Never Used     Social History     Substance and Sexual Activity   Drug Use Not Currently    Types: Marijuana       Family History: Mother:  Severe MS  Ultimately passed due to her MS      Physical Exam: Vitals:   Blood Pressure: 151/86 (06/02/22 1745)  Pulse: 90 (06/02/22 1745)  Temperature: 97 9 °F (36 6 °C) (06/02/22 1513)  Temp Source: Oral (06/02/22 1513)  Respirations: 22 (06/02/22 1745)  Height: 5' 4" (162 6 cm) (06/02/22 1513)  Weight - Scale: 113 kg (250 lb) (06/02/22 1513)  SpO2: 97 % (06/02/22 1745)    Physical Exam  Vitals and nursing note reviewed  Constitutional:       Appearance: Normal appearance  HENT:      Head: Normocephalic and atraumatic  Mouth/Throat:      Mouth: Mucous membranes are moist       Pharynx: Oropharynx is clear  No oropharyngeal exudate  Eyes:      Extraocular Movements: Extraocular movements intact  Cardiovascular:      Rate and Rhythm: Normal rate and regular rhythm  Pulses: Normal pulses  Heart sounds: Normal heart sounds  No murmur heard  No friction rub  No gallop  Pulmonary:      Effort: Pulmonary effort is normal  No respiratory distress  Breath sounds: Normal breath sounds  No stridor  No wheezing or rales  Abdominal:      General: Abdomen is flat  Bowel sounds are normal  There is no distension  Palpations: Abdomen is soft  Tenderness: There is no abdominal tenderness  Musculoskeletal:      Right lower leg: No edema  Left lower leg: No edema  Skin:     General: Skin is warm and dry  Neurological:      General: No focal deficit present  Mental Status: She is alert and oriented to person, place, and time            Additional Data:     Lab Results:  Results from last 7 days   Lab Units 06/02/22  1600   WBC Thousand/uL 5 22   HEMOGLOBIN g/dL 14 9   HEMATOCRIT % 44 6   PLATELETS Thousands/uL 125*   NEUTROS PCT % 49   LYMPHS PCT % 37   MONOS PCT % 10   EOS PCT % 3     Results from last 7 days   Lab Units 06/02/22  1600   SODIUM mmol/L 138   POTASSIUM mmol/L 4 0   CHLORIDE mmol/L 101   CO2 mmol/L 23   BUN mg/dL 10   CREATININE mg/dL 0 67   ANION GAP mmol/L 14*   CALCIUM mg/dL 8 6   ALBUMIN g/dL 3 9   TOTAL BILIRUBIN mg/dL 0 50   ALK PHOS U/L 81   ALT U/L 106*   AST U/L 212*   GLUCOSE RANDOM mg/dL 82                       Imaging: No pertinent imaging reviewed  MRI inpatient order    (Results Pending)       EKG and Other Studies Reviewed on Admission:   · EKG: NSR  HR 98     ** Please Note: This note has been constructed using a voice recognition system   **

## 2022-06-02 NOTE — ASSESSMENT & PLAN NOTE
Reports drinking 3-5 shots per day  Prior to April, was drinking more heavily but has cut back  Ethanol level today 337, reports drinking heavily over the holiday weekend and this week  She has interest in quitting/cutting further back, but does not wish for detox  Will place on CIWA    Start Librium 10 mg q 8 H  IVF and vitamin supplementation

## 2022-06-02 NOTE — ED PROVIDER NOTES
History  Chief Complaint   Patient presents with    Abdominal Pain     Patient presents to the ER being tachycardic, concerned about chronic pain in her side, having N/V, not being able to eat and her use of alcohol for pain control  53-year-old female with history of diabetes, hypertension, obesity, anxiety and alcohol abuse has multiple complaints  Initial complaint is of chronic right sided chest wall pain that has been bothering her for several years  She does not endorse any change in this chronic pain  On my exam it is tender and I can reproduce pain pushing along the lateral aspect of chest wall in the lower ribs  No crepitance rash or deformity  She was seen for this atypical chest pain approximately 5 weeks ago and had essentially normal labs and chest x-ray at that time  Patient also had severe viral syndrome about 2 weeks ago and since then feels that she occasionally is unable to get a full deep breath  She denies recent fever, cough or any change in her normal dyspnea on exertion  There has been no hemoptysis, trauma, surgery, immobility, pain or swelling of any extremity or personal/family history of thrombophilia  Patient is also concerned that she has had diplopia and blurred vision in her left eye for the past 3 days  She has appointment to see an ophthalmologist at 9:00 a m  tomorrow  She says looking at a computer screen is difficult to see because of this vision problem  When she covers one eye the diplopia resolves but she notes the vision in the left eye is not as clear as it is in the right eye  She had recent prescription changes from the eye doctor  She is very concerned because her mother had MS and is concerned this is an MS symptom  Patient admits to continuing to drink alcohol although she is trying to stop  She drinks to relieve the pain on her side and also to relieve withdrawal symptoms  She had a drink before arrival here today            Prior to Admission Medications   Prescriptions Last Dose Informant Patient Reported? Taking? Multiple Vitamins-Minerals (MULTIVITAMIN ADULT PO)  Self Yes No   Sig: Take 1 tablet by mouth daily   Patient not taking: Reported on 2022    bisoprolol (ZEBETA) 10 MG tablet   No No   Si and 1/2 tab PO q day   famotidine (PEPCID) 20 mg tablet   No No   Sig: Take 1 tablet (20 mg total) by mouth 2 (two) times a day   fluticasone (FLONASE) 50 mcg/act nasal spray   Yes No   Si spray into each nostril daily   gabapentin (Neurontin) 100 mg capsule   No No   Sig: Take 1 capsule (100 mg total) by mouth 3 (three) times a day   hydrOXYzine pamoate (VISTARIL) 50 mg capsule   Yes No   Sig: TAKE 1 CAPSULE BY MOUTH EVERY DAY AS NEEDED NO MORE THAN 6 CAPSULES IN 24 HOURS   levothyroxine 50 mcg tablet   No No   Sig: Take 1 tablet (50 mcg total) by mouth daily   venlafaxine (EFFEXOR-XR) 150 mg 24 hr capsule   No No   Sig: Take 1 capsule (150 mg total) by mouth daily      Facility-Administered Medications: None       Past Medical History:   Diagnosis Date    ETOH abuse     Insulin controlled gestational diabetes mellitus (GDM) during pregnancy, antepartum 2019    Metformin dinner/HS insulin  Last Assessment & Plan:  BG log reviewed today:- previous review on Friday with recommendation for increasing HS insulin  Fastings: high normal 1 hour PP: within the desired range   Discussed rationale and recommendation to change current medical therapy as listed:   Bedtime: 18 units Levemir  Continue same dose of Metformin, 1000 mg at dinner    Denies signs and sympto    Obesity        Past Surgical History:   Procedure Laterality Date     SECTION  2018     SECTION  10/20/2017    DILATION AND CURETTAGE OF UTERUS      MASTOIDECTOMY  2013    MASTOIDECTOMY Right     WISDOM TOOTH EXTRACTION         Family History   Problem Relation Age of Onset    Multiple sclerosis Mother     Thyroid disease Mother     Diabetes Mother  Hypertension Mother     COPD Mother     Rectal cancer Father 47    Hyperlipidemia Father     Hypertension Father     Alcohol abuse Father     Depression Sister     Hypertension Sister      I have reviewed and agree with the history as documented  E-Cigarette/Vaping    E-Cigarette Use Never User      E-Cigarette/Vaping Substances    Nicotine No     THC No     CBD No     Flavoring No     Other No     Unknown No      Social History     Tobacco Use    Smoking status: Current Every Day Smoker     Packs/day: 0 50     Types: Cigarettes     Last attempt to quit: 2017     Years since quittin 4    Smokeless tobacco: Never Used   Vaping Use    Vaping Use: Never used   Substance Use Topics    Alcohol use: Yes    Drug use: Not Currently     Types: Marijuana       Review of Systems   Constitutional: Positive for fatigue  Negative for fever  Eyes: Positive for visual disturbance  Negative for photophobia, pain and redness  Respiratory: Negative for cough and shortness of breath  Cardiovascular: Positive for chest pain  Negative for palpitations and leg swelling  Gastrointestinal: Negative for abdominal pain, blood in stool and constipation  Genitourinary: Negative for dysuria and frequency  Musculoskeletal: Negative for gait problem and myalgias  Psychiatric/Behavioral: Negative for self-injury  The patient is nervous/anxious  All other systems reviewed and are negative  Physical Exam  Physical Exam  Vitals and nursing note reviewed  Constitutional:       General: She is not in acute distress  Appearance: She is well-developed  She is obese  She is not ill-appearing or diaphoretic  HENT:      Head: Normocephalic and atraumatic  Mouth/Throat:      Mouth: Mucous membranes are moist       Pharynx: Oropharynx is clear  Eyes:      General: No scleral icterus  Extraocular Movements: Extraocular movements intact        Conjunctiva/sclera: Conjunctivae normal  Pupils: Pupils are equal, round, and reactive to light  Comments: No nystagmus  No dysconjugate get gaze is noted  Left retina is generally more red than the right and optic disc margin not as sharp  IOP: 22 OD, 20 OS  Cardiovascular:      Rate and Rhythm: Normal rate and regular rhythm  Heart sounds: No murmur heard  Pulmonary:      Effort: Pulmonary effort is normal       Breath sounds: Normal breath sounds  Chest:      Chest wall: Tenderness (Lung right mid axillary line lower ribs  No crepitance or deformity  No rash ) present  Abdominal:      General: Bowel sounds are normal  There is no distension or abdominal bruit  There are no signs of injury  Palpations: Abdomen is soft  There is no fluid wave or mass  Tenderness: There is no abdominal tenderness  Negative signs include Sarah's sign and McBurney's sign  Hernia: No hernia is present  Musculoskeletal:         General: Normal range of motion  Cervical back: Normal range of motion and neck supple  Skin:     General: Skin is warm and dry  Capillary Refill: Capillary refill takes less than 2 seconds  Findings: No rash  Neurological:      General: No focal deficit present  Mental Status: She is alert and oriented to person, place, and time  Cranial Nerves: No cranial nerve deficit  Motor: No weakness  Coordination: Coordination normal       Deep Tendon Reflexes: Reflexes are normal and symmetric  Psychiatric:         Mood and Affect: Mood is anxious           Behavior: Behavior normal          Vital Signs  ED Triage Vitals [06/02/22 1513]   Temperature Pulse Respirations Blood Pressure SpO2   97 9 °F (36 6 °C) (!) 132 18 (!) 191/115 95 %      Temp Source Heart Rate Source Patient Position - Orthostatic VS BP Location FiO2 (%)   Oral Monitor Sitting Left arm --      Pain Score       8           Vitals:    06/02/22 1513 06/02/22 1626 06/02/22 1745   BP: (!) 191/115  151/86   Pulse: (!) 132 (!) 115 90   Patient Position - Orthostatic VS: Sitting           Visual Acuity  Visual Acuity    Flowsheet Row Most Recent Value   Visual acuity R eye is 20/20   Visual acuity Left eye is 20/25   Visual acuity in both eyes is 20/20   L Pupil Size (mm) 4   R Pupil Size (mm) 4          ED Medications  Medications   chlordiazePOXIDE (LIBRIUM) capsule 10 mg (has no administration in time range)   tetracaine 0 5 % ophthalmic solution 2 drop (2 drops Left Eye Given by Other 6/2/22 1805)   sodium chloride 0 9 % bolus 1,000 mL (1,000 mL Intravenous New Bag 6/2/22 1624)       Diagnostic Studies  Results Reviewed     Procedure Component Value Units Date/Time    Sedimentation rate, automated [[id number]]  (Normal) Collected: 06/02/22 1600    Lab Status: Final result Specimen: Blood from Arm, Right Updated: 06/02/22 1851     Sed Rate 16 mm/hour     TSH, 3rd generation with Free T4 reflex [[id number]]     Lab Status: No result Specimen: Blood     Vitamin B12 [[id number]]     Lab Status: No result Specimen: Blood     Methylmalonic acid, serum [[id number]]     Lab Status: No result Specimen: Blood     Vitamin B1, whole blood [[id number]]     Lab Status: No result Specimen: Blood     C-reactive protein [[id number]]  (Normal) Collected: 06/02/22 1600    Lab Status: Final result Specimen: Blood from Arm, Right Updated: 06/02/22 1829     CRP <0 5 mg/L     Lyme Antibody Profile with reflex to [employer] [[id number]] Collected: 06/02/22 1745    Lab Status:  In process Specimen: Blood from Arm, Right Updated: 06/02/22 1748    Urine Microscopic [[id number]]  (Normal) Collected: 06/02/22 1626    Lab Status: Final result Specimen: Urine, Clean Catch Updated: 06/02/22 1717     RBC, UA None Seen /hpf      WBC, UA None Seen /hpf      Epithelial Cells Occasional /hpf      Bacteria, UA None Seen /hpf      MUCUS THREADS None Seen    Ethanol [[id number]]  (Abnormal) Collected: 06/02/22 1600    Lab Status: Final result Specimen: Blood from Arm, Right Updated: 06/02/22 1714     Ethanol Lvl 327 mg/dL     Rapid drug screen, urine [[id number]]  (Abnormal) Collected: 06/02/22 1626    Lab Status: Final result Specimen: Urine, Clean Catch Updated: 06/02/22 1656     Amph/Meth UR Negative     Barbiturate Ur Negative     Benzodiazepine Urine Negative     Cocaine Urine Negative     Methadone Urine Negative     Opiate Urine Negative     PCP Ur Negative     THC Urine Positive     Oxycodone Urine Negative    Narrative:      Presumptive report  If requested, specimen will be sent to reference lab for confirmation  FOR MEDICAL PURPOSES ONLY  IF CONFIRMATION NEEDED PLEASE CONTACT THE LAB WITHIN 5 DAYS      Drug Screen Cutoff Levels:  AMPHETAMINE/METHAMPHETAMINES  1000 ng/mL  BARBITURATES     200 ng/mL  BENZODIAZEPINES     200 ng/mL  COCAINE      300 ng/mL  METHADONE      300 ng/mL  OPIATES      300 ng/mL  PHENCYCLIDINE     25 ng/mL  THC       50 ng/mL  OXYCODONE      100 ng/mL    UA (URINE) with reflex to Scope [[id number]]  (Abnormal) Collected: 06/02/22 1626    Lab Status: Final result Specimen: Urine, Clean Catch Updated: 06/02/22 1648     Color, UA Yellow     Clarity, UA Clear     Specific Gravity, UA <=1 005     pH, UA 6 0     Leukocytes, UA Negative     Nitrite, UA Negative     Protein, UA 30 (1+) mg/dl      Glucose, UA Negative mg/dl      Ketones, UA Negative mg/dl      Urobilinogen, UA 0 2 E U /dl      Bilirubin, UA Negative     Blood, UA Negative    CBC and differential [[id number]]  (Abnormal) Collected: 06/02/22 1600    Lab Status: Final result Specimen: Blood from Arm, Right Updated: 06/02/22 1634     WBC 5 22 Thousand/uL      RBC 4 72 Million/uL      Hemoglobin 14 9 g/dL      Hematocrit 44 6 %      MCV 95 fL      MCH 31 6 pg      MCHC 33 4 g/dL      RDW 16 2 %      MPV 10 6 fL      Platelets 027 Thousands/uL      nRBC 0 /100 WBCs      Neutrophils Relative 49 %      Immat GRANS % 0 %      Lymphocytes Relative 37 %      Monocytes Relative 10 %      Eosinophils Relative 3 % Basophils Relative 1 %      Neutrophils Absolute 2 56 Thousands/µL      Immature Grans Absolute 0 01 Thousand/uL      Lymphocytes Absolute 1 95 Thousands/µL      Monocytes Absolute 0 50 Thousand/µL      Eosinophils Absolute 0 13 Thousand/µL      Basophils Absolute 0 07 Thousands/µL     POCT pregnancy, urine [[id number]]  (Normal) Resulted: 06/02/22 1633    Lab Status: Final result Updated: 06/02/22 1633     EXT PREG TEST UR (Ref: Negative) Negative     Control Valid    Comprehensive metabolic panel [[id number]]  (Abnormal) Collected: 06/02/22 1600    Lab Status: Final result Specimen: Blood from Arm, Right Updated: 06/02/22 1625     Sodium 138 mmol/L      Potassium 4 0 mmol/L      Chloride 101 mmol/L      CO2 23 mmol/L      ANION GAP 14 mmol/L      BUN 10 mg/dL      Creatinine 0 67 mg/dL      Glucose 82 mg/dL      Calcium 8 6 mg/dL       U/L       U/L      Alkaline Phosphatase 81 U/L      Total Protein 7 6 g/dL      Albumin 3 9 g/dL      Total Bilirubin 0 50 mg/dL      eGFR 114 ml/min/1 73sq m     Narrative:      [place] guidelines for Chronic Kidney Disease (CKD):     Stage 1 with normal or high GFR (GFR > 90 mL/min/1 73 square meters)    Stage 2 Mild CKD (GFR = 60-89 mL/min/1 73 square meters)    Stage 3A Moderate CKD (GFR = 45-59 mL/min/1 73 square meters)    Stage 3B Moderate CKD (GFR = 30-44 mL/min/1 73 square meters)    Stage 4 Severe CKD (GFR = 15-29 mL/min/1 73 square meters)    Stage 5 End Stage CKD (GFR <15 mL/min/1 73 square meters)  Note: GFR calculation is accurate only with a steady state creatinine    Lipase [[id number]]  (Normal) Collected: 06/02/22 1600    Lab Status: Final result Specimen: Blood from Arm, Right Updated: 06/02/22 1625     Lipase 200 u/L     COVID/FLU - 24 hour TAT [[id number]] Collected: 06/02/22 1600    Lab Status:  In process Specimen: Nares from Nose Updated: 06/02/22 1604                 No orders to display              Procedures  ECG 12 Lead Documentation Only    Date/Time: 6/2/2022 4:13 PM  Performed by: Kia Keys DO  Authorized by: Kia Keys DO     ECG reviewed by me, the ED Provider: yes    Patient location:  ED  Previous ECG:     Previous ECG:  Compared to current    Similarity:  No change    Comparison to cardiac monitor: Yes    Interpretation:     Interpretation: normal               ED Course  ED Course as of 06/02/22 1854   u Jun 02, 2022 1975 Ben Zapien Case discussed with Neurology, Dr Dorado  He feels this is suspicious for MS and we should do MRI of brain and orbits with and without contrast   The MRI can be scheduled for 9:00 a m  tomorrow  Texted slim for admission  SBIRT 20yo+    Flowsheet Row Most Recent Value   SBIRT (23 yo +)    In order to provide better care to our patients, we are screening all of our patients for alcohol and drug use  Would it be okay to ask you these screening questions? Yes Filed at: 06/02/2022 1819   Initial Alcohol Screen: US AUDIT-C     1  How often do you have a drink containing alcohol? 6 Filed at: 06/02/2022 1819   2  How many drinks containing alcohol do you have on a typical day you are drinking? 2  [reports a "few shots"] Filed at: 06/02/2022 1819   3b  FEMALE Any Age, or MALE 65+: How often do you have 4 or more drinks on one occassion? 6 Filed at: 06/02/2022 1819   Audit-C Score 14 Filed at: 06/02/2022 1819                    MDM  Number of Diagnoses or Management Options  Alcoholism ([facility] [address] ): established and improving  Anxiety: established and worsening  Diplopia: new and requires workup  Right-sided chest wall pain: established and improving  Visual changes: new and requires workup  Diagnosis management comments: 29-year-old female with multiple complaints, mostly chronic  States she is trying to cut down on alcohol use but drank today before coming in  Chronic right-sided chest wall pain is reproducible    Prior workups have been unrevealing and I feel this is musculoskeletal   She has had elevated liver enzymes likely due to drinking  This is being followed by her PCP  She states that she has felt very fatigued after having viral symptoms within the last 2 weeks  We will check for COVID but she has no recent fever, cough or hypoxemia  Patient has new visual complaints  No evidence of current ocular infection or trauma  Left fundus appears slightly more red than the right and optic disc margins are not as sharp as they ore on the left  Intra-ocular pressure is 20 millimeters mercury  Case discussed with Neurology  There is concern for possible MS  MRI ordered for tomorrow morning and Solu-Medrol 1000 milligrams recommended for tonight  This information was relayed to the admitting team        Amount and/or Complexity of Data Reviewed  Clinical lab tests: ordered and reviewed  Review and summarize past medical records: yes  Discuss the patient with other providers: yes  Independent visualization of images, tracings, or specimens: yes        Disposition  Final diagnoses:   Visual changes   Diplopia   Right-sided chest wall pain   Alcoholism ([facility] [address] )   Anxiety     Time reflects when diagnosis was documented in both MDM as applicable and the Disposition within this note     Time User Action Codes Description Comment    6/2/2022  6:43 PM Rondall Reading Add [H53 9] Visual changes     6/2/2022  6:43 PM Rondall Reading Add [H53 2] Diplopia     6/2/2022  6:47 PM Rondall Reading Add [R07 89] Right-sided chest wall pain     6/2/2022  6:47 PM Rondall Reading Add [F10 20] Alcoholism ([facility] [address] )     6/2/2022  6:48 PM Rondall Reading Add [F41 9] Anxiety       ED Disposition     ED Disposition   Admit    Condition   Stable    Date/Time   Thu Jun 2, 2022  6:42 PM    Comment   Case was discussed with Dr Tuan Edward and the patient's admission status was agreed to be Admission Status: inpatient status to the service of Dr Tuan Edward              Follow-up Information None         Patient's Medications   Discharge Prescriptions    No medications on file       No discharge procedures on file      PDMP Review       Value Time User    PDMP Reviewed  Yes 6/10/2021 11:27 AM Lennie Sims DO          ED Provider  Electronically Signed by           Meli Barnard DO  06/02/22 5865

## 2022-06-03 ENCOUNTER — APPOINTMENT (INPATIENT)
Dept: ULTRASOUND IMAGING | Facility: HOSPITAL | Age: 37
DRG: 644 | End: 2022-06-03
Payer: COMMERCIAL

## 2022-06-03 ENCOUNTER — TELEPHONE (OUTPATIENT)
Dept: MRI IMAGING | Facility: HOSPITAL | Age: 37
End: 2022-06-03

## 2022-06-03 ENCOUNTER — APPOINTMENT (INPATIENT)
Dept: INTERVENTIONAL RADIOLOGY/VASCULAR | Facility: HOSPITAL | Age: 37
DRG: 644 | End: 2022-06-03
Attending: PSYCHIATRY & NEUROLOGY
Payer: COMMERCIAL

## 2022-06-03 PROBLEM — F10.231 ALCOHOL WITHDRAWAL DELIRIUM, ACUTE, HYPERACTIVE (HCC): Status: ACTIVE | Noted: 2022-06-03

## 2022-06-03 PROBLEM — F10.931 ALCOHOL WITHDRAWAL DELIRIUM, ACUTE, HYPERACTIVE (HCC): Status: ACTIVE | Noted: 2022-06-03

## 2022-06-03 LAB
ALBUMIN SERPL BCP-MCNC: 3.9 G/DL (ref 3.5–5)
ALP SERPL-CCNC: 80 U/L (ref 46–116)
ALT SERPL W P-5'-P-CCNC: 103 U/L (ref 12–78)
ANION GAP SERPL CALCULATED.3IONS-SCNC: 15 MMOL/L (ref 4–13)
ANION GAP SERPL CALCULATED.3IONS-SCNC: 18 MMOL/L (ref 4–13)
APPEARANCE CSF: NORMAL
AST SERPL W P-5'-P-CCNC: 172 U/L (ref 5–45)
B BURGDOR IGG+IGM SER-ACNC: 54
BASOPHILS # BLD MANUAL: 0 THOUSAND/UL (ref 0–0.1)
BASOPHILS NFR MAR MANUAL: 0 % (ref 0–1)
BILIRUB SERPL-MCNC: 1 MG/DL (ref 0.2–1)
BUN SERPL-MCNC: 10 MG/DL (ref 5–25)
BUN SERPL-MCNC: 8 MG/DL (ref 5–25)
CALCIUM SERPL-MCNC: 8.5 MG/DL (ref 8.3–10.1)
CALCIUM SERPL-MCNC: 8.6 MG/DL (ref 8.3–10.1)
CHLORIDE SERPL-SCNC: 101 MMOL/L (ref 100–108)
CHLORIDE SERPL-SCNC: 98 MMOL/L (ref 100–108)
CO2 SERPL-SCNC: 19 MMOL/L (ref 21–32)
CO2 SERPL-SCNC: 19 MMOL/L (ref 21–32)
CREAT SERPL-MCNC: 0.67 MG/DL (ref 0.6–1.3)
CREAT SERPL-MCNC: 0.78 MG/DL (ref 0.6–1.3)
EOSINOPHIL # BLD MANUAL: 0 THOUSAND/UL (ref 0–0.4)
EOSINOPHIL NFR BLD MANUAL: 0 % (ref 0–6)
ERYTHROCYTE [DISTWIDTH] IN BLOOD BY AUTOMATED COUNT: 15.9 % (ref 11.6–15.1)
FLUAV RNA RESP QL NAA+PROBE: NEGATIVE
FLUBV RNA RESP QL NAA+PROBE: NEGATIVE
GFR SERPL CREATININE-BSD FRML MDRD: 114 ML/MIN/1.73SQ M
GFR SERPL CREATININE-BSD FRML MDRD: 98 ML/MIN/1.73SQ M
GLUCOSE CSF-MCNC: 91 MG/DL (ref 50–80)
GLUCOSE SERPL-MCNC: 161 MG/DL (ref 65–140)
GLUCOSE SERPL-MCNC: 183 MG/DL (ref 65–140)
GLUCOSE SERPL-MCNC: 91 MG/DL (ref 65–140)
HCT VFR BLD AUTO: 45.2 % (ref 34.8–46.1)
HGB BLD-MCNC: 15 G/DL (ref 11.5–15.4)
LYMPHOCYTES # BLD AUTO: 0.4 THOUSAND/UL (ref 0.6–4.47)
LYMPHOCYTES # BLD AUTO: 17 % (ref 14–44)
MCH RBC QN AUTO: 31.3 PG (ref 26.8–34.3)
MCHC RBC AUTO-ENTMCNC: 33.2 G/DL (ref 31.4–37.4)
MCV RBC AUTO: 94 FL (ref 82–98)
MONOCYTES # BLD AUTO: 0.02 THOUSAND/UL (ref 0–1.22)
MONOCYTES NFR BLD: 1 % (ref 4–12)
NEUTROPHILS # BLD MANUAL: 1.93 THOUSAND/UL (ref 1.85–7.62)
NEUTS BAND NFR BLD MANUAL: 1 % (ref 0–8)
NEUTS SEG NFR BLD AUTO: 81 % (ref 43–75)
PLATELET # BLD AUTO: 111 THOUSANDS/UL (ref 149–390)
PLATELET BLD QL SMEAR: ABNORMAL
PMV BLD AUTO: 10.3 FL (ref 8.9–12.7)
POTASSIUM SERPL-SCNC: 4.3 MMOL/L (ref 3.5–5.3)
POTASSIUM SERPL-SCNC: 4.5 MMOL/L (ref 3.5–5.3)
PROT CSF-MCNC: 51 MG/DL (ref 15–45)
PROT SERPL-MCNC: 8 G/DL (ref 6.4–8.2)
RBC # BLD AUTO: 4.8 MILLION/UL (ref 3.81–5.12)
RBC # CSF MANUAL: 7 UL (ref 0–10)
SARS-COV-2 RNA RESP QL NAA+PROBE: NEGATIVE
SODIUM SERPL-SCNC: 135 MMOL/L (ref 136–145)
SODIUM SERPL-SCNC: 135 MMOL/L (ref 136–145)
T4 FREE SERPL-MCNC: 0.66 NG/DL (ref 0.76–1.46)
TOTAL CELLS COUNTED BLD: NO
TUBE # CSF: 4
VIT B12 SERPL-MCNC: 1236 PG/ML (ref 100–900)
WBC # BLD AUTO: 2.35 THOUSAND/UL (ref 4.31–10.16)
WBC # CSF AUTO: 0 /UL (ref 0–5)

## 2022-06-03 PROCEDURE — 83873 ASSAY OF CSF PROTEIN: CPT | Performed by: PSYCHIATRY & NEUROLOGY

## 2022-06-03 PROCEDURE — 82948 REAGENT STRIP/BLOOD GLUCOSE: CPT

## 2022-06-03 PROCEDURE — 86255 FLUORESCENT ANTIBODY SCREEN: CPT | Performed by: PSYCHIATRY & NEUROLOGY

## 2022-06-03 PROCEDURE — 62270 DX LMBR SPI PNXR: CPT

## 2022-06-03 PROCEDURE — 80074 ACUTE HEPATITIS PANEL: CPT

## 2022-06-03 PROCEDURE — 84166 PROTEIN E-PHORESIS/URINE/CSF: CPT | Performed by: PSYCHIATRY & NEUROLOGY

## 2022-06-03 PROCEDURE — 99223 1ST HOSP IP/OBS HIGH 75: CPT | Performed by: PSYCHIATRY & NEUROLOGY

## 2022-06-03 PROCEDURE — 009U3ZX DRAINAGE OF SPINAL CANAL, PERCUTANEOUS APPROACH, DIAGNOSTIC: ICD-10-PCS | Performed by: INTERNAL MEDICINE

## 2022-06-03 PROCEDURE — 85027 COMPLETE CBC AUTOMATED: CPT | Performed by: STUDENT IN AN ORGANIZED HEALTH CARE EDUCATION/TRAINING PROGRAM

## 2022-06-03 PROCEDURE — 82040 ASSAY OF SERUM ALBUMIN: CPT | Performed by: PSYCHIATRY & NEUROLOGY

## 2022-06-03 PROCEDURE — 82784 ASSAY IGA/IGD/IGG/IGM EACH: CPT | Performed by: PSYCHIATRY & NEUROLOGY

## 2022-06-03 PROCEDURE — 82164 ANGIOTENSIN I ENZYME TEST: CPT | Performed by: PSYCHIATRY & NEUROLOGY

## 2022-06-03 PROCEDURE — 82042 OTHER SOURCE ALBUMIN QUAN EA: CPT | Performed by: PSYCHIATRY & NEUROLOGY

## 2022-06-03 PROCEDURE — 83916 OLIGOCLONAL BANDS: CPT | Performed by: PSYCHIATRY & NEUROLOGY

## 2022-06-03 PROCEDURE — 87483 CNS DNA AMP PROBE TYPE 12-25: CPT | Performed by: PSYCHIATRY & NEUROLOGY

## 2022-06-03 PROCEDURE — 82945 GLUCOSE OTHER FLUID: CPT | Performed by: PSYCHIATRY & NEUROLOGY

## 2022-06-03 PROCEDURE — 80053 COMPREHEN METABOLIC PANEL: CPT | Performed by: STUDENT IN AN ORGANIZED HEALTH CARE EDUCATION/TRAINING PROGRAM

## 2022-06-03 PROCEDURE — 85007 BL SMEAR W/DIFF WBC COUNT: CPT | Performed by: STUDENT IN AN ORGANIZED HEALTH CARE EDUCATION/TRAINING PROGRAM

## 2022-06-03 PROCEDURE — 99232 SBSQ HOSP IP/OBS MODERATE 35: CPT

## 2022-06-03 PROCEDURE — 84157 ASSAY OF PROTEIN OTHER: CPT | Performed by: PSYCHIATRY & NEUROLOGY

## 2022-06-03 PROCEDURE — 62328 DX LMBR SPI PNXR W/FLUOR/CT: CPT | Performed by: INTERNAL MEDICINE

## 2022-06-03 PROCEDURE — 87070 CULTURE OTHR SPECIMN AEROBIC: CPT | Performed by: PSYCHIATRY & NEUROLOGY

## 2022-06-03 PROCEDURE — 80048 BASIC METABOLIC PNL TOTAL CA: CPT

## 2022-06-03 PROCEDURE — 99291 CRITICAL CARE FIRST HOUR: CPT | Performed by: NURSE PRACTITIONER

## 2022-06-03 PROCEDURE — 77003 FLUOROGUIDE FOR SPINE INJECT: CPT

## 2022-06-03 PROCEDURE — 89051 BODY FLUID CELL COUNT: CPT | Performed by: PSYCHIATRY & NEUROLOGY

## 2022-06-03 PROCEDURE — 89050 BODY FLUID CELL COUNT: CPT | Performed by: PSYCHIATRY & NEUROLOGY

## 2022-06-03 RX ORDER — LORAZEPAM 2 MG/ML
1 INJECTION INTRAMUSCULAR
Status: COMPLETED | OUTPATIENT
Start: 2022-06-03 | End: 2022-06-04

## 2022-06-03 RX ORDER — SODIUM CHLORIDE 9 MG/ML
75 INJECTION, SOLUTION INTRAVENOUS CONTINUOUS
Status: DISCONTINUED | OUTPATIENT
Start: 2022-06-03 | End: 2022-06-04

## 2022-06-03 RX ORDER — CHLORDIAZEPOXIDE HYDROCHLORIDE 25 MG/1
25 CAPSULE, GELATIN COATED ORAL EVERY 6 HOURS SCHEDULED
Status: DISCONTINUED | OUTPATIENT
Start: 2022-06-03 | End: 2022-06-03

## 2022-06-03 RX ORDER — ATROPINE SULFATE 0.1 MG/ML
INJECTION INTRAVENOUS
Status: DISCONTINUED
Start: 2022-06-03 | End: 2022-06-03 | Stop reason: WASHOUT

## 2022-06-03 RX ORDER — LORAZEPAM 2 MG/ML
1 INJECTION INTRAMUSCULAR ONCE
Status: COMPLETED | OUTPATIENT
Start: 2022-06-03 | End: 2022-06-03

## 2022-06-03 RX ORDER — LORAZEPAM 2 MG/ML
4 INJECTION INTRAMUSCULAR ONCE
Status: COMPLETED | OUTPATIENT
Start: 2022-06-03 | End: 2022-06-03

## 2022-06-03 RX ORDER — INSULIN LISPRO 100 [IU]/ML
2-12 INJECTION, SOLUTION INTRAVENOUS; SUBCUTANEOUS
Status: DISCONTINUED | OUTPATIENT
Start: 2022-06-03 | End: 2022-06-04 | Stop reason: HOSPADM

## 2022-06-03 RX ORDER — LIDOCAINE WITH 8.4% SOD BICARB 0.9%(10ML)
SYRINGE (ML) INJECTION CODE/TRAUMA/SEDATION MEDICATION
Status: COMPLETED | OUTPATIENT
Start: 2022-06-03 | End: 2022-06-03

## 2022-06-03 RX ORDER — DIAZEPAM 5 MG/ML
10 INJECTION, SOLUTION INTRAMUSCULAR; INTRAVENOUS ONCE
Status: DISCONTINUED | OUTPATIENT
Start: 2022-06-03 | End: 2022-06-03

## 2022-06-03 RX ADMIN — CHLORDIAZEPOXIDE HYDROCHLORIDE 25 MG: 25 CAPSULE ORAL at 20:12

## 2022-06-03 RX ADMIN — FAMOTIDINE 20 MG: 20 TABLET ORAL at 17:34

## 2022-06-03 RX ADMIN — ACETAMINOPHEN 650 MG: 325 TABLET ORAL at 15:51

## 2022-06-03 RX ADMIN — PHENOBARBITAL SODIUM 260 MG: 130 INJECTION INTRAMUSCULAR; INTRAVENOUS at 21:26

## 2022-06-03 RX ADMIN — SUCRALFATE 1 G: 1 TABLET ORAL at 06:00

## 2022-06-03 RX ADMIN — SODIUM CHLORIDE 125 ML/HR: 0.9 INJECTION, SOLUTION INTRAVENOUS at 11:29

## 2022-06-03 RX ADMIN — SUCRALFATE 1 G: 1 TABLET ORAL at 12:04

## 2022-06-03 RX ADMIN — CHLORDIAZEPOXIDE HYDROCHLORIDE 10 MG: 5 CAPSULE ORAL at 06:00

## 2022-06-03 RX ADMIN — SUCRALFATE 1 G: 1 TABLET ORAL at 15:53

## 2022-06-03 RX ADMIN — NICOTINE 1 PATCH: 14 PATCH, EXTENDED RELEASE TRANSDERMAL at 08:03

## 2022-06-03 RX ADMIN — Medication 100 MG: at 08:03

## 2022-06-03 RX ADMIN — Medication 10 ML: at 12:14

## 2022-06-03 RX ADMIN — INSULIN LISPRO 2 UNITS: 100 INJECTION, SOLUTION INTRAVENOUS; SUBCUTANEOUS at 22:42

## 2022-06-03 RX ADMIN — LEVOTHYROXINE SODIUM 50 MCG: 25 TABLET ORAL at 06:00

## 2022-06-03 RX ADMIN — VENLAFAXINE HYDROCHLORIDE 150 MG: 150 CAPSULE, EXTENDED RELEASE ORAL at 08:03

## 2022-06-03 RX ADMIN — B-COMPLEX W/ C & FOLIC ACID TAB 1 TABLET: TAB at 08:03

## 2022-06-03 RX ADMIN — BISOPROLOL FUMARATE 15 MG: 5 TABLET ORAL at 08:03

## 2022-06-03 RX ADMIN — SODIUM CHLORIDE 1000 MG: 0.9 INJECTION, SOLUTION INTRAVENOUS at 23:21

## 2022-06-03 RX ADMIN — ONDANSETRON 4 MG: 2 INJECTION INTRAMUSCULAR; INTRAVENOUS at 07:28

## 2022-06-03 RX ADMIN — ACETAMINOPHEN 650 MG: 325 TABLET ORAL at 07:28

## 2022-06-03 RX ADMIN — LORAZEPAM 1 MG: 2 INJECTION INTRAMUSCULAR; INTRAVENOUS at 12:03

## 2022-06-03 RX ADMIN — LORAZEPAM 4 MG: 2 INJECTION INTRAMUSCULAR; INTRAVENOUS at 20:01

## 2022-06-03 RX ADMIN — CHLORDIAZEPOXIDE HYDROCHLORIDE 10 MG: 5 CAPSULE ORAL at 15:51

## 2022-06-03 RX ADMIN — SODIUM CHLORIDE 75 ML/HR: 0.9 INJECTION, SOLUTION INTRAVENOUS at 15:53

## 2022-06-03 RX ADMIN — FOLIC ACID TAB 400 MCG 400 MCG: 400 TAB at 08:03

## 2022-06-03 RX ADMIN — FAMOTIDINE 20 MG: 20 TABLET ORAL at 08:03

## 2022-06-03 RX ADMIN — THIAMINE HYDROCHLORIDE 500 MG: 100 INJECTION, SOLUTION INTRAMUSCULAR; INTRAVENOUS at 22:13

## 2022-06-03 NOTE — ASSESSMENT & PLAN NOTE
Patient reports horizontal diplopia and blurry vision her left eye x3 days  Denies eye pain  She is concerned about possible MS flare as her mother had MS  Recent visit to the ophthalmologist who adjusted her prescription, patient reports that he did not note any abnormalities though  · Vital signs are stable, mildly hypertensive  · Neuro exam is benign  She does have some hand tremors bilaterally which she states is not new  PERRLA  · She is a daily drinker, concern for possible malabsorption/vitamin deficiency  · She reports not taking her medications including levothyroxine  · CRP and ESR are WNL  · Consult Neurology, appreciate recs  · check vitamin B1, B12, methylmalonic acid    Start vitamin supplementation  · Restarted levothyroxine- TSH elevated, T4 low at 0 66  · S/p 1000 mg IV Solu-Medrol - no additional steroids at this time  · MRI brain/orbits for optic neuritis rule out- pending  · Lumbar puncture/CSF studies performed 6/3- follow-up results

## 2022-06-03 NOTE — TELEPHONE ENCOUNTER
pt set to arrive in mri at 12:00pm  pt instead went to IR for lumbar puncture  pt eventually arrived at 1:25pm  unable to accomodate pt due to 1:30pm out patient  JOSE RAMON Valentine Pac made aware            CC

## 2022-06-03 NOTE — UTILIZATION REVIEW
Initial Clinical Review    Admission: Date/Time/Statement:   Admission Orders (From admission, onward)     Ordered        06/02/22 1843  INPATIENT ADMISSION  Once                      Orders Placed This Encounter   Procedures    INPATIENT ADMISSION     Standing Status:   Standing     Number of Occurrences:   1     Order Specific Question:   Level of Care     Answer:   Med Surg [16]     Order Specific Question:   Estimated length of stay     Answer:   More than 2 Midnights     Order Specific Question:   Certification     Answer:   I certify that inpatient services are medically necessary for this patient for a duration of greater than two midnights  See H&P and MD Progress Notes for additional information about the patient's course of treatment  ED Arrival Information     Expected   -    Arrival   6/2/2022 15:05    Acuity   Emergent            Means of arrival   Walk-In    Escorted by   Self    Service   Hospitalist    Admission type   Emergency            Arrival complaint   side pain,vomiting everything           Chief Complaint   Patient presents with    Abdominal Pain     Patient presents to the ER being tachycardic, concerned about chronic pain in her side, having N/V, not being able to eat and her use of alcohol for pain control  Initial Presentation: 30 y o  female from home to ED admitted inpatient due to Diplopia/Right flank pain/ETOH abuse  PMH of alcoholism, hypothyroidism, depression  Presented due to diplopia and blurred vision in left eye starting 3 days ago,  Right flank pain, chronic since 2019 but worsening and nausea and vomiting for 3 days with poor intake  Drinks daily 3 to 5 shots and heavier past weekend  Concerned as mother has severe MS, not taking levothyroxine  On exam left retina more red than right and optic disc margin not as sharp  IOP:  22 OD, 20 OS  Tenderness right axillary line lower ribs  Anxious  Tremor  Ethanol 327  UA drug screen + THC  UA 1+ protein     AST 212    In the ED given 1 liter IVF  Plan is check vitamin B1, B12, methylmalonic acid  Start vitamin supplementation, check TSH with reflex T4 and restart home levothyroxine  Consult neurology and give 1000 mg IV Solu Medrol, check MRI brain for optic neuritis  Check RUQ US and consult GI  Start CIWA and start Librium, thiamine  Restart bisoprolol  Continue IVF  Date: 6/3/22    Day 2:  Has cough, states had severe cold about 2 weeks ago  Has continued vision change of slight overlap of horizontal double vision with looking both eyes  On monocular check, left eye more blurry than right  Has chronic right flank pain  Noticed tremors and diaphoresis this morning  On exam obese  Diaphoretic  Gaze appeared largely conjugate, pupils symmetric and reactive, visual field deficit, EOMs appear fully intact (noted an odd left eye sensation with end gaze to the left), symmetric facial expression, symmetric light touch sensation, midline tongue protrusion  Full strength throughout the upper and lower extremities with maximal effort  No focal deficit nor laterality on exam  Temperature sensation cool in the proximal extremities throughout  Tremulous (, both at rest and increased amplitude with intention/action, such as finger-to-nose and heel-to-shin testing  LP done  Continue CIWA, scheduled Librium, folic acid MVI  Continue IVF  GI is consulted  6/3/22 per neurology - patient with diplopia with family history of MS  Patient with alcohol abuse  Plan is LP/CSF analysis  No further Solu medrol until MRI and LP results  6/3/22 IR - lumbar puncture      ED Triage Vitals [06/02/22 1513]   Temperature Pulse Respirations Blood Pressure SpO2   97 9 °F (36 6 °C) (!) 132 18 (!) 191/115 95 %      Temp Source Heart Rate Source Patient Position - Orthostatic VS BP Location FiO2 (%)   Oral Monitor Sitting Left arm --      Pain Score       8          Wt Readings from Last 1 Encounters:   06/02/22 113 kg (250 lb)     Additional Vital Signs:   06/03/22 0717 97 9 °F (36 6 °C) 100 18 139/91 -- -- -- Sitting   06/02/22 2300 -- 82 -- 142/86 -- -- -- --   06/02/22 1900 98 2 °F (36 8 °C) 97 12 131/101 Abnormal  110 96 % None (Room air) Lying   06/02/22 1819 -- -- -- -- -- -- None (Room air) --   06/02/22 1745 -- 90 22 151/86 -- 97 %       Date and Time Eye Opening Best Verbal Response Best Motor Response [place] Coma Scale Score   06/02/22 2307 4 5 6 15   06/02/22 1900 4 5 6 15   06/02/22 1816 4 5 6 15     CIWA 6/3/22:   1 at 0700     7 at 1045   7 at 1430  6/2/22:  1 at 2300   1 at 1900  Pertinent Labs/Diagnostic Test Results:   US right upper quadrant with liver dopplers   Final Result by Damir Nation MD (06/03 1705)      1  Hepatomegaly with moderately increased liver echotexture consistent with hepatic steatosis  2   Unremarkable liver Doppler  Workstation performed: [id number]         IR lumbar puncture   Final Result by Gregory Rao MD (06/03 5467)   Impression:       Successful fluoroscopic-guided lumbar puncture              Workstation performed: [id number]         MRI brain and orbits wo and w contrast    (Results Pending)     6/2/22 ecg Normal sinus rhythm  Normal ECG    Results from last 7 days   Lab Units 06/03/22  0356 06/02/22  1600   WBC Thousand/uL 2 35* 5 22   HEMOGLOBIN g/dL 15 0 14 9   HEMATOCRIT % 45 2 44 6   PLATELETS Thousands/uL 111* 125*   NEUTROS ABS Thousands/µL  --  2 56   BANDS PCT % 1  --      Results from last 7 days   Lab Units 06/03/22  0356 06/02/22  1600   SODIUM mmol/L 135* 138   POTASSIUM mmol/L 4 5 4 0   CHLORIDE mmol/L 98* 101   CO2 mmol/L 19* 23   ANION GAP mmol/L 18* 14*   BUN mg/dL 8 10   CREATININE mg/dL 0 67 0 67   EGFR ml/min/1 73sq m 114 114   CALCIUM mg/dL 8 6 8 6     Results from last 7 days   Lab Units 06/03/22  0356 06/02/22  1600   AST U/L 172* 212*   ALT U/L 103* 106*   ALK PHOS U/L 80 81   TOTAL PROTEIN g/dL 8 0 7 6   ALBUMIN g/dL 3 9 3 9 TOTAL BILIRUBIN mg/dL 1 00 0 50         Results from last 7 days   Lab Units 06/03/22  0356 06/02/22  1600   GLUCOSE RANDOM mg/dL 91 82     Results from last 7 days   Lab Units 06/02/22  1857   TSH 3RD GENERATON uIU/mL 4 631*     Results from last 7 days   Lab Units 06/02/22  1600   LIPASE u/L 200     Results from last 7 days   Lab Units 06/02/22  1600   CRP mg/L <0 5   SED RATE mm/hour 16     Results from last 7 days   Lab Units 06/02/22  1626   CLARITY UA  Clear   COLOR UA  Yellow   SPEC GRAV UA  <=1 005   PH UA  6 0   GLUCOSE UA mg/dl Negative   KETONES UA mg/dl Negative   BLOOD UA  Negative   PROTEIN UA mg/dl 30 (1+)*   NITRITE UA  Negative   BILIRUBIN UA  Negative   UROBILINOGEN UA E U /dl 0 2   LEUKOCYTES UA  Negative   WBC UA /hpf None Seen   RBC UA /hpf None Seen   BACTERIA UA /hpf None Seen   EPITHELIAL CELLS WET PREP /hpf Occasional   MUCUS THREADS  None Seen     Results from last 7 days   Lab Units 06/02/22  1626   AMPH/METH  Negative   BARBITURATE UR  Negative   BENZODIAZEPINE UR  Negative   COCAINE UR  Negative   METHADONE URINE  Negative   OPIATE UR  Negative   PCP UR  Negative   THC UR  Positive*     Results from last 7 days   Lab Units 06/02/22  1600   ETHANOL LVL mg/dL 327*       ED Treatment:   Medication Administration from 06/02/2022 1505 to 06/02/2022 1942       Date/Time Order Dose Route Action Comments     06/02/2022 1805 tetracaine 0 5 % ophthalmic solution 2 drop 2 drop Left Eye Given by Other Given by Dr Jarod Syed     06/02/2022 1624 sodium chloride 0 9 % bolus 1,000 mL 1,000 mL Intravenous New Bag         Past Medical History:   Diagnosis Date    ETOH abuse     Insulin controlled gestational diabetes mellitus (GDM) during pregnancy, antepartum 8/13/2019    Metformin dinner/HS insulin  Last Assessment & Plan:  BG log reviewed today:- previous review on Friday with recommendation for increasing HS insulin  Fastings: high normal 1 hour PP: within the desired range   Discussed rationale and recommendation to change current medical therapy as listed:   Bedtime: 18 units Levemir  Continue same dose of Metformin, 1000 mg at dinner  Denies signs and sympto    Obesity      Present on Admission:   ETOH abuse   Essential hypertension   Hypothyroidism   Right flank pain   Generalized anxiety disorder      Admitting Diagnosis: Diplopia [H53 2]  Alcoholism ([address] ) [F10 20]  Anxiety [F41 9]  Abdominal pain [R10 9]  Right flank pain [R10 9]  Right-sided chest wall pain [R07 89]  Visual changes [H53 9]  Age/Sex: 30 y o  female  Admission Orders:  6/2/22 1843 inpatient   Scheduled Medications:  bisoprolol, 15 mg, Oral, Daily  chlordiazePOXIDE, 10 mg, Oral, Q8H TAVARES  famotidine, 20 mg, Oral, BID  folic acid, 710 mcg, Oral, Daily  levothyroxine, 50 mcg, Oral, Daily  LORazepam, 1 mg, Intravenous, Once - 1130 6/3/22   multivitamin stress formula, 1 tablet, Oral, Daily  nicotine, 1 patch, Transdermal, Daily  sucralfate, 1 g, Oral, 4x Daily (AC & HS)  thiamine, 100 mg, Oral, Daily  venlafaxine, 150 mg, Oral, Daily    methylPREDNISolone sodium succinate (Solu-MEDROL) 1,000 mg in sodium chloride 0 9 % 250 mL IVPB  Dose: 1,000 mg  Freq: Once Route: IV  Last Dose: 1,000 mg (06/02/22 2213)  Start: 06/02/22 1945 End: 06/02/22 2313    Continuous IV Infusions:  sodium chloride, 125 mL/hr, Intravenous, Continuous      PRN Meds:  acetaminophen, 650 mg, Oral, Q6H PRN - used x 1 6/3/22   aluminum-magnesium hydroxide-simethicone, 30 mL, Oral, Q6H PRN  ondansetron, 4 mg, Intravenous, Q4H PRN - used x 1 6/2/22, x 1 6/3/22     CIWA  Continuous pulse oximetry    IP CONSULT TO GASTROENTEROLOGY  IP CONSULT TO NEUROLOGY    Network Utilization Review Department  ATTENTION: Please call with any questions or concerns to [phone] and carefully listen to the prompts so that you are directed to the right person   All voicemails are confidential   Michelle Scott all requests for admission clinical reviews, approved or denied determinations and any other requests to dedicated fax number below belonging to the campus where the patient is receiving treatment   List of dedicated fax numbers for the Facilities:  [address] [place] [address] DENIALS (Administrative/Medical Necessity) [phone]   [address]  [facility] (Maternity/NICU/Pediatrics) [phone]   [address]  [address] [address] [place] [address] [place] [address] [place] [address] [phone]

## 2022-06-03 NOTE — PLAN OF CARE
Problem: PAIN - ADULT  Goal: Verbalizes/displays adequate comfort level or baseline comfort level  Description: Interventions:  - Encourage patient to monitor pain and request assistance  - Assess pain using appropriate pain scale  - Administer analgesics based on type and severity of pain and evaluate response  - Implement non-pharmacological measures as appropriate and evaluate response  - Consider cultural and social influences on pain and pain management  - Notify physician/advanced practitioner if interventions unsuccessful or patient reports new pain  Outcome: Progressing     Problem: INFECTION - ADULT  Goal: Absence or prevention of progression during hospitalization  Description: INTERVENTIONS:  - Assess and monitor for signs and symptoms of infection  - Monitor lab/diagnostic results  - Monitor all insertion sites, i e  indwelling lines, tubes, and drains  - Monitor endotracheal if appropriate and nasal secretions for changes in amount and color  - Wallace appropriate cooling/warming therapies per order  - Administer medications as ordered  - Instruct and encourage patient and family to use good hand hygiene technique  - Identify and instruct in appropriate isolation precautions for identified infection/condition  Outcome: Progressing  Goal: Absence of fever/infection during neutropenic period  Description: INTERVENTIONS:  - Monitor WBC    Outcome: Progressing     Problem: SAFETY ADULT  Goal: Patient will remain free of falls  Description: INTERVENTIONS:  - Educate patient/family on patient safety including physical limitations  - Instruct patient to call for assistance with activity   - Consult OT/PT to assist with strengthening/mobility   - Keep Call bell within reach  - Keep bed low and locked with side rails adjusted as appropriate  - Keep care items and personal belongings within reach  - Initiate and maintain comfort rounds  - Make Fall Risk Sign visible to staff  - Offer Toileting every 2 Hours, in advance of need  - Initiate/Maintain alarm  - Obtain necessary fall risk management equipment:   - Apply yellow socks and bracelet for high fall risk patients  - Consider moving patient to room near nurses station  Outcome: Progressing  Goal: Maintain or return to baseline ADL function  Description: INTERVENTIONS:  -  Assess patient's ability to carry out ADLs; assess patient's baseline for ADL function and identify physical deficits which impact ability to perform ADLs (bathing, care of mouth/teeth, toileting, grooming, dressing, etc )  - Assess/evaluate cause of self-care deficits   - Assess range of motion  - Assess patient's mobility; develop plan if impaired  - Assess patient's need for assistive devices and provide as appropriate  - Encourage maximum independence but intervene and supervise when necessary  - Involve family in performance of ADLs  - Assess for home care needs following discharge   - Consider OT consult to assist with ADL evaluation and planning for discharge  - Provide patient education as appropriate  Outcome: Progressing  Goal: Maintains/Returns to pre admission functional level  Description: INTERVENTIONS:  - Perform BMAT or MOVE assessment daily    - Set and communicate daily mobility goal to care team and patient/family/caregiver  - Collaborate with rehabilitation services on mobility goals if consulted  - Perform Range of Motion 3 times a day  - Reposition patient every 2 hours    - Dangle patient 3 times a day  - Stand patient 3 times a day  - Ambulate patient 3 times a day  - Out of bed to chair 3 times a day   - Out of bed for meals 3 times a day  - Out of bed for toileting  - Record patient progress and toleration of activity level   Outcome: Progressing     Problem: DISCHARGE PLANNING  Goal: Discharge to home or other facility with appropriate resources  Description: INTERVENTIONS:  - Identify barriers to discharge w/patient and caregiver  - Arrange for needed discharge resources and transportation as appropriate  - Identify discharge learning needs (meds, wound care, etc )  - Arrange for interpretive services to assist at discharge as needed  - Refer to Case Management Department for coordinating discharge planning if the patient needs post-hospital services based on physician/advanced practitioner order or complex needs related to functional status, cognitive ability, or social support system  Outcome: Progressing     Problem: Knowledge Deficit  Goal: Patient/family/caregiver demonstrates understanding of disease process, treatment plan, medications, and discharge instructions  Description: Complete learning assessment and assess knowledge base    Interventions:  - Provide teaching at level of understanding  - Provide teaching via preferred learning methods  Outcome: Progressing     Problem: NEUROSENSORY - ADULT  Goal: Achieves stable or improved neurological status  Description: INTERVENTIONS  - Monitor and report changes in neurological status  - Monitor vital signs such as temperature, blood pressure, glucose, and any other labs ordered   - Initiate measures to prevent increased intracranial pressure  - Monitor for seizure activity and implement precautions if appropriate      Outcome: Progressing  Goal: Remains free of injury related to seizures activity  Description: INTERVENTIONS  - Maintain airway, patient safety  and administer oxygen as ordered  - Monitor patient for seizure activity, document and report duration and description of seizure to physician/advanced practitioner  - If seizure occurs,  ensure patient safety during seizure  - Reorient patient post seizure  - Seizure pads on all 4 side rails  - Instruct patient/family to notify RN of any seizure activity including if an aura is experienced  - Instruct patient/family to call for assistance with activity based on nursing assessment  - Administer anti-seizure medications if ordered    Outcome: Progressing  Goal: Achieves maximal functionality and self care  Description: INTERVENTIONS  - Monitor swallowing and airway patency with patient fatigue and changes in neurological status  - Encourage and assist patient to increase activity and self care     - Encourage visually impaired, hearing impaired and aphasic patients to use assistive/communication devices  Outcome: Progressing     Problem: Potential for Falls  Goal: Patient will remain free of falls  Description: INTERVENTIONS:  - Educate patient/family on patient safety including physical limitations  - Instruct patient to call for assistance with activity   - Consult OT/PT to assist with strengthening/mobility   - Keep Call bell within reach  - Keep bed low and locked with side rails adjusted as appropriate  - Keep care items and personal belongings within reach  - Initiate and maintain comfort rounds  - Make Fall Risk Sign visible to staff  - Offer Toileting every 2 Hours, in advance of need  - Initiate/Maintain alarm  - Obtain necessary fall risk management equipment:   - Apply yellow socks and bracelet for high fall risk patients  - Consider moving patient to room near nurses station  Outcome: Progressing

## 2022-06-03 NOTE — ASSESSMENT & PLAN NOTE
· Reports chronic right-sided pain since 2019  Also reports nausea/vomiting x3 days, has been unable to tolerate any p o  Intake  · CT abdomen/pelvis in the past have only shown hepatic steatosis  · Of note, patient has anaphylactic allergy to contrast dye  · On exam, does have tenderness on palpation of RUQ  · In the setting of elevated LFTs and vomiting, will order RUQ ultrasound- pending   · GI consult    Appreciate recs

## 2022-06-03 NOTE — DISCHARGE INSTRUCTIONS
Lumbar Puncture     WHAT YOU NEED TO KNOW:   Lumbar puncture (LP) is a procedure in which a needle is inserted in your back and into your spinal canal  This is usually done to collect cerebrospinal fluid (CSF) to check for an infection, inflammation, bleeding, or other conditions that affect the brain  CSF is a clear, protective fluid that flows around the brain and inside the spinal canal  LP may also be done to remove CSF to reduce pressure in the brain  DISCHARGE INSTRUCTIONS:     Follow up with your healthcare provider as directed: Write down your questions so you remember to ask them during your visits  Post-lumbar puncture headache: You may develop a headache during the first few hours after your LP that may last for several days  The headache may be mild to severe and may get worse when you sit or stand  The following may help ease a post-lumbar puncture headache:  Drink plenty of liquids: You should drink more liquid than usual after your LP  Ask how much liquid is right for you  Caffeine may be used to treat a headache  Drinks, such as coffee, tea, or some sodas, have caffeine  Ask a Do not drink alcohol  Lie down: If you have a headache after your lumbar puncture, it may be helpful to lie down and rest   You may have a slight soreness over the LP area  This is normal   Remove the band aid or dressing in 24 hours  Contact Interventional Radiology imediately  at [phone] [place] PATIENTS: Contact Interventional Radiology at [phone]) Guzman Chow PATIENTS: Contact Interventional Radiology at [phone]) if any of the following occur: You have a severe headache that does not get better after you lie down  Persistent nausea or vomiting   You have a fever  You have a stiff neck or have trouble thinking clearly  Your legs, feet, or other parts below the waist feel numb, tingly, or weak  You have bleeding or a discharge coming from the area where the needle was put into your back     You have severe pain in your back or neck

## 2022-06-03 NOTE — MALNUTRITION/BMI
This medical record reflects one or more clinical indicators suggestive of malnutrition and/or morbid obesity  BMI Findings:  Adult BMI Classifications: Morbid Obesity 40-44 9        Body mass index is 42 91 kg/m²  Treat with Regular diet  See Nutrition note dated 6/3/22 for additional details  Completed nutrition assessment is viewable in the nutrition documentation

## 2022-06-03 NOTE — PROGRESS NOTES
[place]  Progress Note Mare Pandya 1985, 30 y o  female MRN: [id number]  Unit/Bed#: -01 Encounter: [id number]  Primary Care Provider: Lucita Ayala DO   Date and time admitted to hospital: 6/2/2022  3:20 PM    * Diplopia  Assessment & Plan  Patient reports horizontal diplopia and blurry vision her left eye x3 days  Denies eye pain  She is concerned about possible MS flare as her mother had MS  Recent visit to the ophthalmologist who adjusted her prescription, patient reports that he did not note any abnormalities though  · Vital signs are stable, mildly hypertensive  · Neuro exam is benign  She does have some hand tremors bilaterally which she states is not new  PERRLA  · She is a daily drinker, concern for possible malabsorption/vitamin deficiency  · She reports not taking her medications including levothyroxine  · CRP and ESR are WNL  · Consult Neurology, appreciate recs  · check vitamin B1, B12, methylmalonic acid  Start vitamin supplementation  · Restarted levothyroxine- TSH elevated, T4 low at 0 66  · S/p 1000 mg IV Solu-Medrol - no additional steroids at this time  · MRI brain/orbits for optic neuritis rule out- pending  · Lumbar puncture/CSF studies performed 6/3- follow-up results    Right flank pain  Assessment & Plan  · Reports chronic right-sided pain since 2019  Also reports nausea/vomiting x3 days, has been unable to tolerate any p o  Intake  · CT abdomen/pelvis in the past have only shown hepatic steatosis  · Of note, patient has anaphylactic allergy to contrast dye  · On exam, does have tenderness on palpation of RUQ  · In the setting of elevated LFTs and vomiting, will order RUQ ultrasound- pending   · GI consult  Appreciate recs      ETOH abuse  Assessment & Plan  · Reports drinking 3-5 shots per day  Prior to April, was drinking more heavily but has cut back    · No history of alcohol withdrawal seizures  · History of 3 prior inpatient rehab stays, states she has outpatient AA  · Patient is trying to quit/cut back on her ome, does not wish for detox-case management following  · Ethanol level on admission 337, reports drinking heavily over the holiday weekend and this week  · Will place on CIWA  Started on Librium 10 mg q 8 H  · BM was symptoms of withdrawal in 6/3-tremors, diaphoresis, anxiety  · IVF and vitamin supplementation      Essential hypertension  Assessment & Plan  · On bisoprolol 15 mg, reports she has not been taking it in a long time  · BP mildly elevated 150/90  · Restarted bisoprolol    Generalized anxiety disorder  Assessment & Plan  · Patient currently on Effexor 150 mg daily  · States she is in process of outpatient psychiatry eval  · Continue outpatient follow-up    Hypothyroidism  Assessment & Plan  · On levothyroxine 50mcg, reports she has not been taking this in some time- restarted on admission   · TSH elevated, T4 low (0 66)        VTE Pharmacologic Prophylaxis:   Moderate Risk (Score 3-4) - Pharmacological DVT Prophylaxis Ordered: enoxaparin (Lovenox)  Patient Centered Rounds: I performed bedside rounds with nursing staff today  Discussions with Specialists or Other Care Team Provider:  Neurology    Education and Discussions with Family / Patient: Patient declined call to   Time Spent for Care: 30 minutes  More than 50% of total time spent on counseling and coordination of care as described above  Current Length of Stay: 1 day(s)  Current Patient Status: Inpatient   Certification Statement: The patient will continue to require additional inpatient hospital stay due to Neurology workup  Discharge Plan: Anticipate discharge in 24-48 hrs to discharge location to be determined pending rehab evaluations      Code Status: Level 1 - Full Code    Subjective:   Patient reports she continues to have left eye horizontal diplopia/blurred vision, denies associated headaches, fevers/chills, dizziness/lightheadedness, eye pain, ear pain/discharge, additional physical symptoms  Denies numbness/tingling/weakness in extremities  Patient states she noticed onset of tremor and diaphoresis this a m  Consistent with alcohol withdrawal   She states she has been experiencing anxiety however would not like inpatient psych eval or inpatient rehab for alcohol use  Objective:     Vitals:   Temp (24hrs), Av 1 °F (36 7 °C), Min:97 9 °F (36 6 °C), Max:98 2 °F (36 8 °C)    Temp:  [97 9 °F (36 6 °C)-98 2 °F (36 8 °C)] 97 9 °F (36 6 °C)  HR:  [] 100  Resp:  [12-22] 18  BP: (131-151)/() 139/91  SpO2:  [96 %-97 %] 96 %  Body mass index is 42 91 kg/m²  Input and Output Summary (last 24 hours): Intake/Output Summary (Last 24 hours) at 6/3/2022 1539  Last data filed at 6/3/2022 1201  Gross per 24 hour   Intake 1480 ml   Output --   Net 1480 ml       Physical Exam:   Physical Exam  Vitals and nursing note reviewed  Constitutional:       General: She is not in acute distress  Appearance: She is well-developed  She is diaphoretic  HENT:      Head: Normocephalic and atraumatic  Eyes:      General:         Right eye: No discharge  Left eye: No discharge  Extraocular Movements: Extraocular movements intact  Conjunctiva/sclera: Conjunctivae normal    Cardiovascular:      Rate and Rhythm: Regular rhythm  Tachycardia present  Pulmonary:      Effort: Pulmonary effort is normal  No respiratory distress  Breath sounds: Normal breath sounds  No wheezing, rhonchi or rales  Abdominal:      General: Bowel sounds are normal       Palpations: Abdomen is soft  Tenderness: There is no abdominal tenderness  Musculoskeletal:      Cervical back: Neck supple  Right lower leg: No edema  Left lower leg: No edema  Skin:     General: Skin is warm  Neurological:      Mental Status: She is alert and oriented to person, place, and time        Comments: Generalized tremor noted   Psychiatric:         Mood and Affect: Mood normal          Behavior: Behavior normal           Additional Data:     Labs:  Results from last 7 days   Lab Units 06/03/22  0356 06/02/22  1600   WBC Thousand/uL 2 35* 5 22   HEMOGLOBIN g/dL 15 0 14 9   HEMATOCRIT % 45 2 44 6   PLATELETS Thousands/uL 111* 125*   BANDS PCT % 1  --    NEUTROS PCT %  --  49   LYMPHS PCT %  --  37   LYMPHO PCT % 17  --    MONOS PCT %  --  10   MONO PCT % 1*  --    EOS PCT % 0 3     Results from last 7 days   Lab Units 06/03/22  0356   SODIUM mmol/L 135*   POTASSIUM mmol/L 4 5   CHLORIDE mmol/L 98*   CO2 mmol/L 19*   BUN mg/dL 8   CREATININE mg/dL 0 67   ANION GAP mmol/L 18*   CALCIUM mg/dL 8 6   ALBUMIN g/dL 3 9   TOTAL BILIRUBIN mg/dL 1 00   ALK PHOS U/L 80   ALT U/L 103*   AST U/L 172*   GLUCOSE RANDOM mg/dL 91                       Lines/Drains:  Invasive Devices  Report    Peripheral Intravenous Line  Duration           Peripheral IV 06/02/22 Right Antecubital <1 day                      Imaging: No pertinent imaging reviewed      Recent Cultures (last 7 days):         Last 24 Hours Medication List:   Current Facility-Administered Medications   Medication Dose Route Frequency Provider Last Rate    acetaminophen  650 mg Oral Q6H PRN Talon Pay, PA-C      aluminum-magnesium hydroxide-simethicone  30 mL Oral Q6H PRN Talon Pay, PA-CLARK      bisoprolol  15 mg Oral Daily [place], [place]      chlordiazePOXIDE  10 mg Oral [place], [place]      famotidine  20 mg Oral BID [place], [place]      folic acid  099 mcg Oral Daily [employer] [place], [place]      levothyroxine  50 mcg Oral Daily [employer], [place]      LORazepam  1 mg Intravenous Once in imaging [employer], PA-C      multivitamin stress formula  1 tablet Oral Daily [employer], PA-C      nicotine  1 patch Transdermal Daily [place], [place]      ondansetron  4 mg Intravenous Q4H PRN [employer], PAVENANCIO      sodium chloride  50 mL/hr Intravenous Continuous Dickailyn Fermin PA-C      sucralfate  1 g Oral 4x Daily (AC & HS) Karyn Dumont PA-C      thiamine  100 mg Oral Daily Karyn Dumont [place]      venlafaxine  150 mg Oral Daily Karyn Dumont [place]          Today, Patient Was Seen By: Jennifer Menchaca PA-C    **Please Note: This note may have been constructed using a voice recognition system  **

## 2022-06-03 NOTE — ASSESSMENT & PLAN NOTE
· Reports drinking 3-5 shots per day  Prior to April, was drinking more heavily but has cut back  · No history of alcohol withdrawal seizures  · History of 3 prior inpatient rehab stays, states she has outpatient AA  · Patient is trying to quit/cut back on her ome, does not wish for detox-case management following  · Ethanol level on admission 337, reports drinking heavily over the holiday weekend and this week  · Will place on CIWA    Started on Librium 10 mg q 8 H  · BM was symptoms of withdrawal in 6/3-tremors, diaphoresis, anxiety  · IVF and vitamin supplementation

## 2022-06-03 NOTE — PLAN OF CARE
Problem: PAIN - ADULT  Goal: Verbalizes/displays adequate comfort level or baseline comfort level  Description: Interventions:  - Encourage patient to monitor pain and request assistance  - Assess pain using appropriate pain scale  - Administer analgesics based on type and severity of pain and evaluate response  - Implement non-pharmacological measures as appropriate and evaluate response  - Consider cultural and social influences on pain and pain management  - Notify physician/advanced practitioner if interventions unsuccessful or patient reports new pain  Outcome: Progressing     Problem: INFECTION - ADULT  Goal: Absence or prevention of progression during hospitalization  Description: INTERVENTIONS:  - Assess and monitor for signs and symptoms of infection  - Monitor lab/diagnostic results  - Monitor all insertion sites, i e  indwelling lines, tubes, and drains  - Monitor endotracheal if appropriate and nasal secretions for changes in amount and color  - [place] appropriate cooling/warming therapies per order  - Administer medications as ordered  - Instruct and encourage patient and family to use good hand hygiene technique  - Identify and instruct in appropriate isolation precautions for identified infection/condition  Outcome: Progressing  Goal: Absence of fever/infection during neutropenic period  Description: INTERVENTIONS:  - Monitor WBC    Outcome: Progressing     Problem: SAFETY ADULT  Goal: Patient will remain free of falls  Description: INTERVENTIONS:  - Educate patient/family on patient safety including physical limitations  - Instruct patient to call for assistance with activity   - Consult OT/PT to assist with strengthening/mobility   - Keep Call bell within reach  - Keep bed low and locked with side rails adjusted as appropriate  - Keep care items and personal belongings within reach  - Initiate and maintain comfort rounds  - Make Fall Risk Sign visible to staff  - Offer Toileting every 2 Hours, in advance of need  - Initiate/Maintain alarm  - Obtain necessary fall risk management equipment:   - Apply yellow socks and bracelet for high fall risk patients  - Consider moving patient to room near nurses station  Outcome: Progressing  Goal: Maintain or return to baseline ADL function  Description: INTERVENTIONS:  -  Assess patient's ability to carry out ADLs; assess patient's baseline for ADL function and identify physical deficits which impact ability to perform ADLs (bathing, care of mouth/teeth, toileting, grooming, dressing, etc )  - Assess/evaluate cause of self-care deficits   - Assess range of motion  - Assess patient's mobility; develop plan if impaired  - Assess patient's need for assistive devices and provide as appropriate  - Encourage maximum independence but intervene and supervise when necessary  - Involve family in performance of ADLs  - Assess for home care needs following discharge   - Consider OT consult to assist with ADL evaluation and planning for discharge  - Provide patient education as appropriate  Outcome: Progressing  Goal: Maintains/Returns to pre admission functional level  Description: INTERVENTIONS:  - Perform BMAT or MOVE assessment daily    - Set and communicate daily mobility goal to care team and patient/family/caregiver  - Collaborate with rehabilitation services on mobility goals if consulted  - Perform Range of Motion 3 times a day  - Reposition patient every 2 hours    - Dangle patient 3 times a day  - Stand patient 3 times a day  - Ambulate patient 3 times a day  - Out of bed to chair 3 times a day   - Out of bed for meals 3 times a day  - Out of bed for toileting  - Record patient progress and toleration of activity level   Outcome: Progressing     Problem: DISCHARGE PLANNING  Goal: Discharge to home or other facility with appropriate resources  Description: INTERVENTIONS:  - Identify barriers to discharge w/patient and caregiver  - Arrange for needed discharge resources and transportation as appropriate  - Identify discharge learning needs (meds, wound care, etc )  - Arrange for interpretive services to assist at discharge as needed  - Refer to Case Management Department for coordinating discharge planning if the patient needs post-hospital services based on physician/advanced practitioner order or complex needs related to functional status, cognitive ability, or social support system  Outcome: Progressing     Problem: Knowledge Deficit  Goal: Patient/family/caregiver demonstrates understanding of disease process, treatment plan, medications, and discharge instructions  Description: Complete learning assessment and assess knowledge base    Interventions:  - Provide teaching at level of understanding  - Provide teaching via preferred learning methods  Outcome: Progressing     Problem: NEUROSENSORY - ADULT  Goal: Achieves stable or improved neurological status  Description: INTERVENTIONS  - Monitor and report changes in neurological status  - Monitor vital signs such as temperature, blood pressure, glucose, and any other labs ordered   - Initiate measures to prevent increased intracranial pressure  - Monitor for seizure activity and implement precautions if appropriate      Outcome: Progressing  Goal: Remains free of injury related to seizures activity  Description: INTERVENTIONS  - Maintain airway, patient safety  and administer oxygen as ordered  - Monitor patient for seizure activity, document and report duration and description of seizure to physician/advanced practitioner  - If seizure occurs,  ensure patient safety during seizure  - Reorient patient post seizure  - Seizure pads on all 4 side rails  - Instruct patient/family to notify RN of any seizure activity including if an aura is experienced  - Instruct patient/family to call for assistance with activity based on nursing assessment  - Administer anti-seizure medications if ordered    Outcome: Progressing  Goal: Achieves maximal functionality and self care  Description: INTERVENTIONS  - Monitor swallowing and airway patency with patient fatigue and changes in neurological status  - Encourage and assist patient to increase activity and self care     - Encourage visually impaired, hearing impaired and aphasic patients to use assistive/communication devices  Outcome: Progressing

## 2022-06-03 NOTE — CONSULTS
Consultation - Neurology   Susan France 30 y o  female MRN: [id number]  Unit/Bed#: -01 Encounter: [id number]      Assessment/Plan   * Diplopia  Assessment & Plan  16-year-old female with history of alcohol abuse, obesity, diabetes, thyroid dysfunction, presenting initially on 06/02 following multiple complaints including chronic right-sided chest/flank pain, shortness of breath  Neurology asked to evaluate regarding diplopia/blurred vision (family history of MS)  Also with ongoing alcohol abuse  Plan:  -will pursue LP/CSF analysis, CSF orders placed, appreciate IR assistance  -MRI brain/orbits pending  -given dose of Solu-Medrol 1 G last night, will hold on additional Solu-Medrol doses until MRI brain/orbits and preliminary CSF results are reviewed  -GI workup for right flank pain, ultrasound pending, trending LFTs/CMP  -withdrawal monitoring for alcohol abuse, CIWA if indicated  -placed on Librium, consider folic acid/thiamine supplementation as well  -lab work resulted:   -Lyme negative   -TSH greater than 4, T4 slightly low (patient notes noncompliance with levothyroxine)   -UDS positive for THC   -CRP, ESR both WNL  -lab work pending   -B1, B12, MMA  -supportive care    Will further discuss plan of care with attending neurologist      Recommendations for outpatient neurological follow up have yet to be determined  History of Present Illness     Reason for Consult / Principal Problem: Diplopia, blurry vision    HPI: Velia Lucero is a 30 y o  female with history as mentioned above in assessment who neurology is asked to evaluate in regards to vision disturbances  Discussed with patient this morning:  Patient approximately 4 days ago began to notice visual disturbance, described as "my eyes not working in sync", describes slight overlap horizontal double vision with looking both eyes, with monocular visual testing left eye is slightly more blurry/less focus than the right eye    Visual symptoms have persisted without worsening or improvement over the past several days  She denies any specific headache or eye pain with the symptoms  Denies any issues with acute dysphagia, speech disturbances, focal/lateralizing weakness or numbness to the extremities  No issues with color distortion visually  She also has chronic right flank pain; still drinks regularly, last drink was yesterday morning around 11:00 M (patient states she drinks to dull/lessen the right flank pain she experiences)    Initial workup beginning yesterday included tachycardia, hypertension with BP of 191/115  Afebrile, labs with elevated LFTs, no gross metabolic derangements otherwise, no leukocytosis, some thrombocytopenia  Medical alcohol level of 327, UDS positive for THC  Other lab work as mentioned above  MRI pending, given dose of 1 G Solu-Medrol last night  Notes her mom has history of MS, she began experiencing symptoms at around age 28 (patient's mom has longstanding issues with gait/walking)  Patient noted a severe cold approximately 2 weeks ago, still with slight residual cough (although patient states she smokes daily, thus cough may be from smoking)  Patient states following initial dose of Solu-Medrol yesterday she denies any changes to her visual disturbances (no improvement nor worsening)    Inpatient consult to Neurology  Consult performed by: Stephan Geller PA-C  Consult ordered by: Ada James PA-C          Review of Systems   Constitutional: Negative  Negative for chills, diaphoresis and fatigue  HENT: Negative for hearing loss, tinnitus, trouble swallowing and voice change  Eyes: Positive for visual disturbance  Negative for photophobia  Respiratory: Positive for cough  Gastrointestinal: Positive for abdominal pain  Negative for nausea and vomiting  Musculoskeletal: Negative for gait problem  Skin: Negative  Neurological: Positive for tremors (chronic) and headaches  Negative for dizziness, seizures, syncope, facial asymmetry, speech difficulty, weakness, light-headedness and numbness  Historical Information   Past Medical History:   Diagnosis Date    ETOH abuse     Insulin controlled gestational diabetes mellitus (GDM) during pregnancy, antepartum 2019    Metformin dinner/HS insulin  Last Assessment & Plan:  BG log reviewed today:- previous review on Friday with recommendation for increasing HS insulin  Fastings: high normal 1 hour PP: within the desired range   Discussed rationale and recommendation to change current medical therapy as listed:   Bedtime: 18 units Levemir  Continue same dose of Metformin, 1000 mg at dinner    Denies signs and sympto    Obesity      Past Surgical History:   Procedure Laterality Date     SECTION  2018     SECTION  10/20/2017    DILATION AND CURETTAGE OF UTERUS      MASTOIDECTOMY  2013    MASTOIDECTOMY Right     WISDOM TOOTH EXTRACTION       Social History   Social History     Substance and Sexual Activity   Alcohol Use Yes     Social History     Substance and Sexual Activity   Drug Use Not Currently    Types: Marijuana     E-Cigarette/Vaping    E-Cigarette Use Never User      E-Cigarette/Vaping Substances    Nicotine No     THC No     CBD No     Flavoring No     Other No     Unknown No      Social History     Tobacco Use   Smoking Status Current Every Day Smoker    Packs/day: 0 50    Types: Cigarettes    Last attempt to quit: 2017    Years since quittin 4   Smokeless Tobacco Never Used     Family History:   Family History   Problem Relation Age of Onset    Multiple sclerosis Mother     Thyroid disease Mother     Diabetes Mother     Hypertension Mother     COPD Mother     Rectal cancer Father 47    Hyperlipidemia Father     Hypertension Father     Alcohol abuse Father     Depression Sister     Hypertension Sister        Review of previous medical records was completed  Meds/Allergies   current meds:   Current Facility-Administered Medications   Medication Dose Route Frequency    acetaminophen (TYLENOL) tablet 650 mg  650 mg Oral Q6H PRN    aluminum-magnesium hydroxide-simethicone (MYLANTA) oral suspension 30 mL  30 mL Oral Q6H PRN    bisoprolol (ZEBETA) tablet 15 mg  15 mg Oral Daily    chlordiazePOXIDE (LIBRIUM) capsule 10 mg  10 mg Oral Q8H [address]    famotidine (PEPCID) tablet 20 mg  20 mg Oral BID    folic acid (FOLVITE) tablet 400 mcg  400 mcg Oral Daily    levothyroxine tablet 50 mcg  50 mcg Oral Daily    multivitamin stress formula tablet 1 tablet  1 tablet Oral Daily    nicotine (NICODERM CQ) 14 mg/24hr TD 24 hr patch 1 patch  1 patch Transdermal Daily    ondansetron (ZOFRAN) injection 4 mg  4 mg Intravenous Q4H PRN    sodium chloride 0 9 % infusion  125 mL/hr Intravenous Continuous    sucralfate (CARAFATE) tablet 1 g  1 g Oral 4x Daily (AC & HS)    thiamine tablet 100 mg  100 mg Oral Daily    venlafaxine (EFFEXOR-XR) 24 hr capsule 150 mg  150 mg Oral Daily    and PTA meds:   Prior to Admission Medications   Prescriptions Last Dose Informant Patient Reported? Taking?    Multiple Vitamins-Minerals (MULTIVITAMIN ADULT PO)  Self Yes No   Sig: Take 1 tablet by mouth daily   Patient not taking: Reported on 2022    bisoprolol (ZEBETA) 10 MG tablet Past Month at Unknown time  No Yes   Si and 1/2 tab PO q day   famotidine (PEPCID) 20 mg tablet Past Month at Unknown time  No Yes   Sig: Take 1 tablet (20 mg total) by mouth 2 (two) times a day   fluticasone (FLONASE) 50 mcg/act nasal spray 2022 at Unknown time  Yes Yes   Si spray into each nostril daily   gabapentin (Neurontin) 100 mg capsule Past Month at Unknown time  No Yes   Sig: Take 1 capsule (100 mg total) by mouth 3 (three) times a day   hydrOXYzine pamoate (VISTARIL) 50 mg capsule Past Month at Unknown time  Yes Yes   Sig: TAKE 1 CAPSULE BY MOUTH EVERY DAY AS NEEDED NO MORE THAN 6 CAPSULES IN 24 HOURS   levothyroxine 50 mcg tablet Past Month at Unknown time  No Yes   Sig: Take 1 tablet (50 mcg total) by mouth daily   venlafaxine (EFFEXOR-XR) 150 mg 24 hr capsule 6/1/2022 at Unknown time  No Yes   Sig: Take 1 capsule (150 mg total) by mouth daily      Facility-Administered Medications: None       Allergies   Allergen Reactions    Contrast [Iodinated Diagnostic Agents] Anaphylaxis       Objective   Vitals:Blood pressure 139/91, pulse 100, temperature 97 9 °F (36 6 °C), temperature source Oral, resp  rate 18, height 5' 4" (1 626 m), weight 113 kg (250 lb), SpO2 96 %  ,Body mass index is 42 91 kg/m²  No intake or output data in the 24 hours ending 06/03/22 0737    Invasive Devices: Invasive Devices  Report    Peripheral Intravenous Line  Duration           Peripheral IV 06/02/22 Right Antecubital <1 day                Physical Exam  Constitutional:       Appearance: She is obese  She is diaphoretic  Eyes:      Extraocular Movements: Extraocular movements intact and EOM normal       Conjunctiva/sclera: Conjunctivae normal       Pupils: Pupils are equal, round, and reactive to light  Cardiovascular:      Rate and Rhythm: Normal rate  Pulmonary:      Effort: Pulmonary effort is normal    Abdominal:      General: There is no distension  Musculoskeletal:         General: Normal range of motion  Cervical back: Normal range of motion and neck supple  Skin:     General: Skin is warm  Neurological:      General: No focal deficit present  Mental Status: She is alert  Neurologic Exam     Mental Status   Awake, alert, fully oriented  Speech clear, no dysarthria nor aphasia  Following commands  Cranial Nerves     CN II   Visual fields full to confrontation  CN III, IV, VI   Pupils are equal, round, and reactive to light  Extraocular motions are normal      CN V   Facial sensation intact  CN VII   Facial expression full, symmetric       Gaze appeared largely conjugate, pupils symmetric and reactive, visual field deficit, EOMs appear fully intact (noted an odd left eye sensation with end gaze to the left), symmetric facial expression, symmetric light touch sensation, midline tongue protrusion  Motor Exam   Muscle bulk: normal  Overall muscle tone: normal  Right arm pronator drift: absent  Left arm pronator drift: absent  Full strength throughout the upper and lower extremities with maximal effort  No focal deficit nor laterality on exam      Sensory Exam   Light touch normal    Vibration normal      Temperature sensation cool in the proximal extremities throughout  Gait, Coordination, and Reflexes Tremulous (chronic per patient), both at rest and increased amplitude with intention/action, such as finger-to-nose and heel-to-shin testing      Patient diaphoretic as well       Lab Results:   CBC:   Results from last 7 days   Lab Units 06/03/22  0356 06/02/22  1600   WBC Thousand/uL 2 35* 5 22   RBC Million/uL 4 80 4 72   HEMOGLOBIN g/dL 15 0 14 9   HEMATOCRIT % 45 2 44 6   MCV fL 94 95   PLATELETS Thousands/uL 111* 125*   , BMP/CMP:   Results from last 7 days   Lab Units 06/03/22  0356 06/02/22  1600   SODIUM mmol/L 135* 138   POTASSIUM mmol/L 4 5 4 0   CHLORIDE mmol/L 98* 101   CO2 mmol/L 19* 23   BUN mg/dL 8 10   CREATININE mg/dL 0 67 0 67   CALCIUM mg/dL 8 6 8 6   AST U/L 172* 212*   ALT U/L 103* 106*   ALK PHOS U/L 80 81   EGFR ml/min/1 73sq m 114 114   , Vitamin B12:   , HgBA1C:   , TSH:   Results from last 7 days   Lab Units 06/02/22  1857   TSH 3RD GENERATON uIU/mL 4 631*   , Coagulation:   , Lipid Profile:   , Ammonia:   , Urinalysis:   Results from last 7 days   Lab Units 06/02/22  1626   COLOR UA  Yellow   CLARITY UA  Clear   SPEC GRAV UA  <=1 005   PH UA  6 0   LEUKOCYTES UA  Negative   NITRITE UA  Negative   GLUCOSE UA mg/dl Negative   KETONES UA mg/dl Negative   BILIRUBIN UA  Negative   BLOOD UA  Negative   , Drug Screen:   Results from last 7 days Lab Units 06/02/22  1626   BARBITURATE UR  Negative   BENZODIAZEPINE UR  Negative   THC UR  Positive*   COCAINE UR  Negative   METHADONE URINE  Negative   OPIATE UR  Negative   PCP UR  Negative   , Medication Drug Levels:       Invalid input(s): CARBAMAZEPINE,  PHENOBARB, LACOSAMIDE, OXCARBAZEPINE  Imaging Studies: I have personally reviewed pertinent films in PACS   MRI inpatient order    (Results Pending)       EKG, Pathology, and Other Studies: I have personally reviewed pertinent reports  VTE Prophylaxis: Sequential compression device (Venodyne)     Code Status: Level 1 - Full Code    Total time spent today 45 minutes  Discussed plan of care with patient and primary team: pursue LP/CSF analysis, await MRI brain/orbits, will discuss Solumedrol plan with attending, neuro checks, therapies, alcohol withdrawal monitoring

## 2022-06-03 NOTE — ASSESSMENT & PLAN NOTE
· On bisoprolol 15 mg, reports she has not been taking it in a long time  · BP mildly elevated 150/90  · Restarted bisoprolol

## 2022-06-03 NOTE — SEDATION DOCUMENTATION
LP completed  Opening pressure 9cmH20  Total 16 5ml clear fluid sent to lab  Patient tolerated well and encouraged to drink plenty fluids   tr

## 2022-06-03 NOTE — ASSESSMENT & PLAN NOTE
· Patient currently on Effexor 150 mg daily  · States she is in process of outpatient psychiatry eval  · Continue outpatient follow-up

## 2022-06-03 NOTE — CASE MANAGEMENT
Case Management Assessment & Discharge Planning Note    Patient name Paddy Rush  Location [facility] 223/-08 MRN [id number]  : 1985 Date 6/3/2022       Current Admission Date: 2022  Current Admission Diagnosis:Diplopia   Patient Active Problem List    Diagnosis Date Noted    Diplopia 2022    Current moderate episode of major depressive disorder ([facility] [address] ) 2022    Right flank pain 2022    ETOH abuse     Essential hypertension 2020    Transaminitis 2020    History of gestational diabetes 2020    Hypothyroidism 2020    Morbid obesity with BMI of 50 0-59 9, adult ([facility] [address] ) 2020    Generalized anxiety disorder 2020      LOS (days): 1  Geometric Mean LOS (GMLOS) (days):   Days to GMLOS:     OBJECTIVE:    Risk of Unplanned Readmission Score: 10 29         Current admission status: Inpatient       Preferred Pharmacy:   [address] [place] [address]  Phone: [phone] Fax: [phone]    Primary Care Provider: Rubina Hdez DO    Primary Insurance: [employer]  Secondary Insurance:     ASSESSMENT:  [address],  [facility] Representative - [facility]   Primary Phone: [phone] (Mobile)  Home Phone: [phone]               Advance Directives  Does patient have a [address] [place] [address] Avenue?: No  Was patient offered paperwork?: Yes (information given)  Does patient currently have a Health Care decision maker?: Yes, please see Health Care Proxy section  Does patient have Advance Directives?: No  Was patient offered paperwork?: Yes (information given)  Primary Contact: Willam Luther         Readmission Root Cause  30 Day Readmission: No    Patient Information  Admitted from[de-identified] Home  Mental Status: Alert  During Assessment patient was accompanied by: Not accompanied during assessment  Assessment information provided by[de-identified] Patient  Primary Caregiver: Self  Support Systems: Self, Spouse/significant other  [place] of Residence: [address] do you live in?: [place]  Type of Current Residence: 2 story home  Upon entering residence, is there a bedroom on the main floor (no further steps)?: No  A bedroom is located on the following floor levels of residence (select all that apply):: 2nd Floor  Upon entering residence, is there a bathroom on the main floor (no further steps)?: Yes  Number of steps to 2nd floor from main floor: One Flight  In the last 12 months, was there a time when you were not able to pay the mortgage or rent on time?: No  In the last 12 months, how many places have you lived?: 1  In the last 12 months, was there a time when you did not have a steady place to sleep or slept in a shelter (including now)?: No  Living Arrangements: Lives w/ Spouse/significant other  Is patient a ?: No    Activities of Daily Living Prior to Admission  Functional Status: Independent  Completes ADLs independently?: Yes  Ambulates independently?: Yes  Does patient use assisted devices?: No  Does patient currently own DME?: No  Does patient have a history of Outpatient Therapy (PT/OT)?: No  Does the patient have a history of Short-Term Rehab?: No  Does patient have a history of HHC?: No  Does patient currently have [facility]?: No         Patient Information Continued  Income Source: Employed  Does patient have prescription coverage?: Yes  Within the past 12 months, you worried that your food would run out before you got the money to buy more : Never true  Within the past 12 months, the food you bought just didn't last and you didn't have money to get more : Never true  Does patient receive dialysis treatments?: No  Does patient have a history of substance abuse?: Yes  History of Withdrawal Symptoms: Denies past symptoms  Is patient currently in treatment for substance abuse?: Yes ([address] unit)  Does patient have a history of Mental Health Diagnosis?: No         Means of Transportation  Means of Transport to Appts[de-identified] Drives Self  In the past 12 months, has lack of transportation kept you from medical appointments or from getting medications?: No  In the past 12 months, has lack of transportation kept you from meetings, work, or from getting things needed for daily living?: No        DISCHARGE DETAILS:    Discharge planning discussed with[de-identified] patient        CM contacted family/caregiver?: No- see comments (patient declined offer)  Were Treatment Team discharge recommendations reviewed with patient/caregiver?: Yes  Did patient/caregiver verbalize understanding of patient care needs?: Yes  Were patient/caregiver advised of the risks associated with not following Treatment Team discharge recommendations?: Yes         [address]         Is the patient interested in [address] at discharge?: No    DME Referral Provided  Referral made for DME?: No                 Discharge Destination Plan[de-identified] Home     Met with patient and reviewed the role of Care Management and discuss any discharge needs  Patient reports residing with her spouse and their  2 children, age 3 and 3 1/2 yrs old, her spouse is currently caring for them  Patient reports being independent of ADL's and works from home  Patient further reports recent alcohol rehab admissions at [facility] in [place] and in [place]  she was in rehab in Sept and Nov of 2021 and March 2022  She is also followed by [facility] unit on a monthly basis  She attends [facility] mtgs and reports no sponsor  She is not interested in further alcohol rehab admission  She plans to return home at discharge and anticipates no discharge needs  Her spouse will transport her home

## 2022-06-03 NOTE — ASSESSMENT & PLAN NOTE
42-year-old female with history of notable alcohol abuse, obesity, diabetes, thyroid dysfunction, presenting initially on 06/02 following multiple complaints including chronic right-sided chest/flank pain, shortness of breath  Neurology asked to evaluate regarding diplopia/blurred vision (family history of MS)  Also with ongoing alcohol abuse  6/3-6/4:  ETOH withdrawal/delirium overnight, upgraded care to step-down/ICU monitoring with CIWA, high-dose thiamine/folic acid (treating for Wernicke's), phenobarbital, librium  Await brain/orbital imaging this afternoon  Vision minimally improved over past day or two following treatment      Plan:  -LP/CSF analysis performed yesterday, results thus far include:   -minimally elevated protein at 51, glucose slightly elevated at 91, WBC 0, normal RBC, ME panel negative, culture/g stain preliminarily with no growth   -pending studies include MS panel, NMO, protein electrophoresis, ACE  -MRI brain/orbits pending for this afternoon (Ativan ordered pre imaging for claustrophobia)  -status post Solu-Medrol 1 G daily initiation, dose 3/3 this evening  -GI workup for right flank pain, ultrasound pending, trending LFTs/CMP  -withdrawal management for alcohol abuse,    -CIWA protocol, high-dose thiamine, folic acid, Librium, phenobarbital  -lab work resulted:   -Lyme negative   -TSH greater than 4, T4 slightly low (patient notes noncompliance with levothyroxine)   -UDS positive for THC   -CRP, ESR both WNL  -lab work pending   -B1, B12, MMA  -supportive care

## 2022-06-03 NOTE — ASSESSMENT & PLAN NOTE
· On levothyroxine 50mcg, reports she has not been taking this in some time- restarted on admission   · TSH elevated, T4 low (0 66)

## 2022-06-04 ENCOUNTER — APPOINTMENT (INPATIENT)
Dept: MRI IMAGING | Facility: HOSPITAL | Age: 37
DRG: 644 | End: 2022-06-04
Payer: COMMERCIAL

## 2022-06-04 VITALS
HEART RATE: 79 BPM | OXYGEN SATURATION: 97 % | SYSTOLIC BLOOD PRESSURE: 159 MMHG | HEIGHT: 64 IN | WEIGHT: 250 LBS | RESPIRATION RATE: 15 BRPM | TEMPERATURE: 98 F | BODY MASS INDEX: 42.68 KG/M2 | DIASTOLIC BLOOD PRESSURE: 95 MMHG

## 2022-06-04 LAB
ALBUMIN SERPL BCP-MCNC: 3.4 G/DL (ref 3.5–5)
ALP SERPL-CCNC: 74 U/L (ref 46–116)
ALT SERPL W P-5'-P-CCNC: 74 U/L (ref 12–78)
ANION GAP SERPL CALCULATED.3IONS-SCNC: 16 MMOL/L (ref 4–13)
AST SERPL W P-5'-P-CCNC: 93 U/L (ref 5–45)
BASOPHILS # BLD AUTO: 0.01 THOUSANDS/ΜL (ref 0–0.1)
BASOPHILS NFR BLD AUTO: 0 % (ref 0–1)
BILIRUB SERPL-MCNC: 1.2 MG/DL (ref 0.2–1)
BUN SERPL-MCNC: 7 MG/DL (ref 5–25)
C GATTII+NEOFOR DNA CSF QL NAA+NON-PROBE: NOT DETECTED
CALCIUM ALBUM COR SERPL-MCNC: 9 MG/DL (ref 8.3–10.1)
CALCIUM SERPL-MCNC: 8.5 MG/DL (ref 8.3–10.1)
CHLORIDE SERPL-SCNC: 100 MMOL/L (ref 100–108)
CMV DNA CSF QL NAA+NON-PROBE: NOT DETECTED
CO2 SERPL-SCNC: 18 MMOL/L (ref 21–32)
CREAT SERPL-MCNC: 0.61 MG/DL (ref 0.6–1.3)
E COLI K1 DNA CSF QL NAA+NON-PROBE: NOT DETECTED
EOSINOPHIL # BLD AUTO: 0 THOUSAND/ΜL (ref 0–0.61)
EOSINOPHIL NFR BLD AUTO: 0 % (ref 0–6)
ERYTHROCYTE [DISTWIDTH] IN BLOOD BY AUTOMATED COUNT: 15.4 % (ref 11.6–15.1)
EV RNA CSF QL NAA+NON-PROBE: NOT DETECTED
GFR SERPL CREATININE-BSD FRML MDRD: 117 ML/MIN/1.73SQ M
GLUCOSE SERPL-MCNC: 162 MG/DL (ref 65–140)
GLUCOSE SERPL-MCNC: 177 MG/DL (ref 65–140)
GLUCOSE SERPL-MCNC: 180 MG/DL (ref 65–140)
GLUCOSE SERPL-MCNC: 235 MG/DL (ref 65–140)
GP B STREP DNA CSF QL NAA+NON-PROBE: NOT DETECTED
HAEM INFLU DNA CSF QL NAA+NON-PROBE: NOT DETECTED
HAV IGM SER QL: NORMAL
HBV CORE IGM SER QL: NORMAL
HBV SURFACE AG SER QL: NORMAL
HCT VFR BLD AUTO: 41.2 % (ref 34.8–46.1)
HCV AB SER QL: NORMAL
HGB BLD-MCNC: 13.6 G/DL (ref 11.5–15.4)
HHV6 DNA CSF QL NAA+NON-PROBE: NOT DETECTED
HSV1 DNA CSF QL NAA+NON-PROBE: NOT DETECTED
HSV2 DNA CSF QL NAA+NON-PROBE: NOT DETECTED
IMM GRANULOCYTES # BLD AUTO: 0.02 THOUSAND/UL (ref 0–0.2)
IMM GRANULOCYTES NFR BLD AUTO: 0 % (ref 0–2)
INR PPP: 1.15 (ref 0.84–1.19)
L MONOCYTOG DNA CSF QL NAA+NON-PROBE: NOT DETECTED
LACTATE SERPL-SCNC: 0.4 MMOL/L (ref 0.5–2)
LYMPHOCYTES # BLD AUTO: 0.32 THOUSANDS/ΜL (ref 0.6–4.47)
LYMPHOCYTES NFR BLD AUTO: 7 % (ref 14–44)
MAGNESIUM SERPL-MCNC: 1.6 MG/DL (ref 1.6–2.6)
MCH RBC QN AUTO: 31.8 PG (ref 26.8–34.3)
MCHC RBC AUTO-ENTMCNC: 33 G/DL (ref 31.4–37.4)
MCV RBC AUTO: 96 FL (ref 82–98)
MONOCYTES # BLD AUTO: 0.18 THOUSAND/ΜL (ref 0.17–1.22)
MONOCYTES NFR BLD AUTO: 4 % (ref 4–12)
N MEN DNA CSF QL NAA+NON-PROBE: NOT DETECTED
NEUTROPHILS # BLD AUTO: 4.02 THOUSANDS/ΜL (ref 1.85–7.62)
NEUTS SEG NFR BLD AUTO: 89 % (ref 43–75)
NRBC BLD AUTO-RTO: 0 /100 WBCS
PARECHOVIRUS A RNA CSF QL NAA+NON-PROBE: NOT DETECTED
PHOSPHATE SERPL-MCNC: 2.7 MG/DL (ref 2.7–4.5)
PLATELET # BLD AUTO: 101 THOUSANDS/UL (ref 149–390)
PMV BLD AUTO: 10.4 FL (ref 8.9–12.7)
POTASSIUM SERPL-SCNC: 4.3 MMOL/L (ref 3.5–5.3)
PROT SERPL-MCNC: 6.9 G/DL (ref 6.4–8.2)
PROTHROMBIN TIME: 14.5 SECONDS (ref 11.6–14.5)
RBC # BLD AUTO: 4.28 MILLION/UL (ref 3.81–5.12)
S PNEUM DNA CSF QL NAA+NON-PROBE: NOT DETECTED
SODIUM SERPL-SCNC: 134 MMOL/L (ref 136–145)
VZV DNA CSF QL NAA+NON-PROBE: NOT DETECTED
WBC # BLD AUTO: 4.55 THOUSAND/UL (ref 4.31–10.16)

## 2022-06-04 PROCEDURE — 80053 COMPREHEN METABOLIC PANEL: CPT

## 2022-06-04 PROCEDURE — 82948 REAGENT STRIP/BLOOD GLUCOSE: CPT

## 2022-06-04 PROCEDURE — G1004 CDSM NDSC: HCPCS

## 2022-06-04 PROCEDURE — 99238 HOSP IP/OBS DSCHRG MGMT 30/<: CPT | Performed by: STUDENT IN AN ORGANIZED HEALTH CARE EDUCATION/TRAINING PROGRAM

## 2022-06-04 PROCEDURE — 85610 PROTHROMBIN TIME: CPT | Performed by: NURSE PRACTITIONER

## 2022-06-04 PROCEDURE — 83605 ASSAY OF LACTIC ACID: CPT | Performed by: NURSE PRACTITIONER

## 2022-06-04 PROCEDURE — 99232 SBSQ HOSP IP/OBS MODERATE 35: CPT | Performed by: PSYCHIATRY & NEUROLOGY

## 2022-06-04 PROCEDURE — A9585 GADOBUTROL INJECTION: HCPCS | Performed by: PHYSICIAN ASSISTANT

## 2022-06-04 PROCEDURE — 83735 ASSAY OF MAGNESIUM: CPT | Performed by: NURSE PRACTITIONER

## 2022-06-04 PROCEDURE — 70543 MRI ORBT/FAC/NCK W/O &W/DYE: CPT

## 2022-06-04 PROCEDURE — 85025 COMPLETE CBC W/AUTO DIFF WBC: CPT

## 2022-06-04 PROCEDURE — 70553 MRI BRAIN STEM W/O & W/DYE: CPT

## 2022-06-04 PROCEDURE — 84100 ASSAY OF PHOSPHORUS: CPT | Performed by: NURSE PRACTITIONER

## 2022-06-04 PROCEDURE — 99291 CRITICAL CARE FIRST HOUR: CPT | Performed by: NURSE PRACTITIONER

## 2022-06-04 PROCEDURE — 99292 CRITICAL CARE ADDL 30 MIN: CPT | Performed by: INTERNAL MEDICINE

## 2022-06-04 PROCEDURE — 99239 HOSP IP/OBS DSCHRG MGMT >30: CPT

## 2022-06-04 RX ORDER — FOLIC ACID 1 MG/1
1 TABLET ORAL DAILY
Status: DISCONTINUED | OUTPATIENT
Start: 2022-06-05 | End: 2022-06-04 | Stop reason: HOSPADM

## 2022-06-04 RX ORDER — PHENOBARBITAL SODIUM 130 MG/ML
130 INJECTION INTRAMUSCULAR ONCE
Status: COMPLETED | OUTPATIENT
Start: 2022-06-04 | End: 2022-06-04

## 2022-06-04 RX ORDER — GABAPENTIN 100 MG/1
100 CAPSULE ORAL
Status: DISCONTINUED | OUTPATIENT
Start: 2022-06-04 | End: 2022-06-04 | Stop reason: HOSPADM

## 2022-06-04 RX ORDER — MAGNESIUM SULFATE HEPTAHYDRATE 40 MG/ML
2 INJECTION, SOLUTION INTRAVENOUS ONCE
Status: COMPLETED | OUTPATIENT
Start: 2022-06-04 | End: 2022-06-04

## 2022-06-04 RX ORDER — FOLIC ACID 1 MG/1
1 TABLET ORAL DAILY
Qty: 30 TABLET | Refills: 0 | Status: SHIPPED | OUTPATIENT
Start: 2022-06-05 | End: 2022-08-01

## 2022-06-04 RX ORDER — GABAPENTIN 100 MG/1
100 CAPSULE ORAL 3 TIMES DAILY
Status: DISCONTINUED | OUTPATIENT
Start: 2022-06-04 | End: 2022-06-04

## 2022-06-04 RX ADMIN — THIAMINE HYDROCHLORIDE 500 MG: 100 INJECTION, SOLUTION INTRAMUSCULAR; INTRAVENOUS at 14:00

## 2022-06-04 RX ADMIN — SUCRALFATE 1 G: 1 TABLET ORAL at 07:32

## 2022-06-04 RX ADMIN — NICOTINE 1 PATCH: 14 PATCH, EXTENDED RELEASE TRANSDERMAL at 08:42

## 2022-06-04 RX ADMIN — INSULIN LISPRO 4 UNITS: 100 INJECTION, SOLUTION INTRAVENOUS; SUBCUTANEOUS at 12:05

## 2022-06-04 RX ADMIN — B-COMPLEX W/ C & FOLIC ACID TAB 1 TABLET: TAB at 08:42

## 2022-06-04 RX ADMIN — SUCRALFATE 1 G: 1 TABLET ORAL at 12:06

## 2022-06-04 RX ADMIN — PHENOBARBITAL SODIUM 130 MG: 130 INJECTION INTRAMUSCULAR; INTRAVENOUS at 02:05

## 2022-06-04 RX ADMIN — SUCRALFATE 1 G: 1 TABLET ORAL at 16:55

## 2022-06-04 RX ADMIN — LEVOTHYROXINE SODIUM 50 MCG: 25 TABLET ORAL at 05:43

## 2022-06-04 RX ADMIN — FAMOTIDINE 20 MG: 20 TABLET ORAL at 17:46

## 2022-06-04 RX ADMIN — FAMOTIDINE 20 MG: 20 TABLET ORAL at 08:42

## 2022-06-04 RX ADMIN — INSULIN LISPRO 2 UNITS: 100 INJECTION, SOLUTION INTRAVENOUS; SUBCUTANEOUS at 16:57

## 2022-06-04 RX ADMIN — GADOBUTROL 10 ML: 604.72 INJECTION INTRAVENOUS at 16:36

## 2022-06-04 RX ADMIN — FOLIC ACID TAB 400 MCG 400 MCG: 400 TAB at 08:42

## 2022-06-04 RX ADMIN — THIAMINE HYDROCHLORIDE 500 MG: 100 INJECTION, SOLUTION INTRAMUSCULAR; INTRAVENOUS at 05:43

## 2022-06-04 RX ADMIN — MAGNESIUM SULFATE HEPTAHYDRATE 2 G: 40 INJECTION, SOLUTION INTRAVENOUS at 07:48

## 2022-06-04 RX ADMIN — LORAZEPAM 1 MG: 2 INJECTION INTRAMUSCULAR; INTRAVENOUS at 15:12

## 2022-06-04 RX ADMIN — BISOPROLOL FUMARATE 15 MG: 5 TABLET ORAL at 08:42

## 2022-06-04 RX ADMIN — INSULIN LISPRO 2 UNITS: 100 INJECTION, SOLUTION INTRAVENOUS; SUBCUTANEOUS at 07:32

## 2022-06-04 NOTE — ASSESSMENT & PLAN NOTE
· On admission patient reported chronic right flank pain since 2019  Also noted to have nausea/vomiting x3 days prior to admission    · Imaging showing hepatic stenosis  · Follow-up right upper quadrant ultrasound  · GI consulted

## 2022-06-04 NOTE — PROGRESS NOTES
[place]  Progress Note Maury Oliva 1985, 30 y o  female MRN: [id number]  Unit/Bed#: -01 Encounter: [id number]  Primary Care Provider: Dhruv Null DO   Date and time admitted to hospital: 6/2/2022  3:20 PM    Alcohol withdrawal delirium, acute, hyperactive ([facility] [address] )  Assessment & Plan  · On admission patient reports 3-5 shots of vodka per day, does report that she has drank more heavily over the holiday weekend and this past week prior to admission  · No history of seizures secondary to withdrawal  · Does have a history of prior inpatient rehab stays  · Alcohol level on admission was 337  · 6/3 On the floor was on CIWA protocol but noted to have a sudden increase an CIWA score this evening, up to 29 from 7 on prior exams  · Patient was very anxious, pacing room, delusional, hallucinating, tremors despite ativan and librium  · Tx'd to SD1 for further care  · Given phenobarbital 260 mg IV once overnight with improvement, pt sleeping   · will continue to dose phenobarbital 130 mg IV as needed for symptom control  · Conservative for Wernicke encephalopathy:  3/6 Started high-dose thiamine 500 mg IV q 8 hours for 3 days  · Continue folic acid supplement and multivitamin  · One-to-one observation     * Diplopia  Assessment & Plan  · Patient with complaints of diplopia and blurry vision in her left eye x3 days prior to admission  Concern for MS flare verses optic neuritis  · Neurology following  · 6/2 Started on pulse dose steroids with Solu-Medrol 1000 mg daily, currently day 3  · Continue sliding scale insulin for glycemic control with pulse steroids  · MRI brain/orbits for optic neuritis ordered and pending  · MS panel pending  · 6/3 LP performed by IR with low opening pressure, follow-up results  · Routine neuro exams and delirium precautions    Right flank pain  Assessment & Plan  · On admission patient reported chronic right flank pain since 2019    Also noted to have nausea/vomiting x3 days prior to admission  · Imaging showing hepatic stenosis   · Follow-up right upper quadrant ultrasound  · GI consulted    ETOH abuse  Assessment & Plan  · Ongoing alcohol abuse, patient refused rehab and psych evaluation on admission  · Further details of plan as discussed above under alcohol withdrawal     Essential hypertension  Assessment & Plan  · Supposed to be On bisoprolol 15 mg at home, but she reports that she has not been taking that  · Bisoprolol restarted this admission, continue     Generalized anxiety disorder  Assessment & Plan  · Home medication is Effexor 150 mg daily and follows with psych as outpatient  · Continue outpatient follow-up  · Home medications held for now due to active alcohol withdrawal    Hypothyroidism  Assessment & Plan  · Continue home levothyroxine      ----------------------------------------------------------------------------------------  HPI/24hr events: Transferred to SD overnight due to severe alcohol withdraw  Given 260 mg IV phenobarbital with improvement of symptoms       Patient appropriate for transfer out of the ICU today?: No  Disposition: Continue Stepdown Level 1 level of care   Code Status: Level 1 - Full Code  ---------------------------------------------------------------------------------------  SUBJECTIVE  Unable to assess due to AMS    Review of Systems   Unable to perform ROS: Mental status change       ---------------------------------------------------------------------------------------  OBJECTIVE    Vitals   Vitals:    22 0717 22 1631 22   BP: 139/91 163/75 136/86 141/92   BP Location: Right arm Left arm  Left arm   Pulse: 100 83 90 76   Resp: 18 18     Temp: 97 9 °F (36 6 °C) 98 °F (36 7 °C) (!) 97 4 °F (36 3 °C) 98 °F (36 7 °C)   TempSrc: Oral Axillary Oral Oral   SpO2:  97% 98% 94%   Weight:       Height:         Temp (24hrs), Av 8 °F (36 6 °C), Min:97 4 °F (36 3 °C), Max:98 °F (36 7 °C)  Current: Temperature: 98 °F (36 7 °C)          Respiratory:  SpO2: SpO2: 94 %       Physical Exam  Vitals and nursing note reviewed  Constitutional:       General: She is not in acute distress  Appearance: She is obese  HENT:      Head: Normocephalic and atraumatic  Nose: Nose normal       Mouth/Throat:      Mouth: Mucous membranes are moist       Pharynx: Oropharynx is clear  Eyes:      Conjunctiva/sclera: Conjunctivae normal       Pupils: Pupils are equal, round, and reactive to light  Cardiovascular:      Rate and Rhythm: Normal rate and regular rhythm  Pulses: Normal pulses  Heart sounds: Normal heart sounds  No murmur heard  Pulmonary:      Effort: Pulmonary effort is normal  No respiratory distress  Breath sounds: Normal breath sounds  No wheezing or rales  Abdominal:      General: Bowel sounds are normal  There is no distension  Palpations: Abdomen is soft  Tenderness: There is no abdominal tenderness  Musculoskeletal:         General: Normal range of motion  Cervical back: Normal range of motion and neck supple  Skin:     General: Skin is warm and dry  Neurological:      General: No focal deficit present  Mental Status: Mental status is at baseline  Cranial Nerves: No cranial nerve deficit  Comments: Oriented to Person only  Lethargic  Moves all extremities to physical stimuli            Laboratory and Diagnostics:  Results from last 7 days   Lab Units 06/03/22  0356 06/02/22  1600   WBC Thousand/uL 2 35* 5 22   HEMOGLOBIN g/dL 15 0 14 9   HEMATOCRIT % 45 2 44 6   PLATELETS Thousands/uL 111* 125*   NEUTROS PCT %  --  49   BANDS PCT % 1  --    MONOS PCT %  --  10   MONO PCT % 1*  --      Results from last 7 days   Lab Units 06/03/22  1939 06/03/22  0356 06/02/22  1600   SODIUM mmol/L 135* 135* 138   POTASSIUM mmol/L 4 3 4 5 4 0   CHLORIDE mmol/L 101 98* 101   CO2 mmol/L 19* 19* 23   ANION GAP mmol/L 15* 18* 14*   BUN mg/dL 10 8 10   CREATININE mg/dL 0 78 0 67 0 67   CALCIUM mg/dL 8 5 8 6 8 6   GLUCOSE RANDOM mg/dL 183* 91 82   ALT U/L  --  103* 106*   AST U/L  --  172* 212*   ALK PHOS U/L  --  80 81   ALBUMIN g/dL  --  3 9 3 9   TOTAL BILIRUBIN mg/dL  --  1 00 0 50                 EKG: SR, 85 BPM  Imaging: I have personally reviewed pertinent reports  Intake and Output  I/O       06/02 0701  06/03 0700 06/03 0701  06/04 0700    P  O   480    I V  (mL/kg)  1000 (8 8)    IV Piggyback  150    Total Intake(mL/kg)  1630 (14 4)    Urine (mL/kg/hr)  400 (0 1)    Total Output  400    Net  +1230                Height and Weights   Height: 5' 4" (162 6 cm)     Body mass index is 42 91 kg/m²  Weight (last 2 days)     Date/Time Weight    06/02/22 1513 113 (250)            Nutrition       Diet Orders   (From admission, onward)             Start     Ordered    06/03/22 2108  Diet NPO; Sips of clear liquids  Diet effective now        References:    Nutrtion Support Algorithm Enteral vs  Parenteral   Question Answer Comment   Diet Type NPO    NPO Except: Sips of clear liquids    RD to adjust diet per protocol?  Yes        06/03/22 2107                  Active Medications  Scheduled Meds:  Current Facility-Administered Medications   Medication Dose Route Frequency Provider Last Rate    acetaminophen  650 mg Oral Q6H PRN Tyrone Potter PA-C      aluminum-magnesium hydroxide-simethicone  30 mL Oral Q6H PRN Tyrone Potter PA-C      bisoprolol  15 mg Oral Daily Tyrone Potter, [place]      famotidine  20 mg Oral BID Tyrone Potter PA-C      folic acid  336 mcg Oral Daily Tyrone Tariq [place]      insulin lispro  2-12 Units Subcutaneous 4x Daily (AC & HS) GRACIELA Dietz      levothyroxine  50 mcg Oral Daily Tyrone Potter PA-C      LORazepam  1 mg Intravenous Once in imaging [facility]      methylPREDNISolone sodium succinate  1,000 mg Intravenous Daily Carl Cronin MD 1,000 mg (06/03/22 7968)    multivitamin stress formula  1 tablet Oral Daily Harman Closs, PA-C      nicotine  1 patch Transdermal Daily Harman Closs, [place]      ondansetron  4 mg Intravenous Q4H PRN Harman Closs, PA-C      sodium chloride  75 mL/hr Intravenous Continuous [address] BEKA Fermin 75 mL/hr (06/03/22 1553)    sucralfate  1 g Oral 4x Daily (AC & HS) Harman Closs, PA-C      thiamine  500 mg Intravenous Q8H Mini Ceron [place], CRNP 500 mg (06/03/22 2213)     Continuous Infusions:  sodium chloride, 75 mL/hr, Last Rate: 75 mL/hr (06/03/22 1553)      PRN Meds:   acetaminophen, 650 mg, Q6H PRN  aluminum-magnesium hydroxide-simethicone, 30 mL, Q6H PRN  LORazepam, 1 mg, Once in imaging  ondansetron, 4 mg, Q4H PRN        Invasive Devices Review  Invasive Devices  Report    Peripheral Intravenous Line  Duration           Peripheral IV 06/02/22 Right Antecubital 1 day                Rationale for remaining devices: NA  ---------------------------------------------------------------------------------------  Advance Directive and Living Will:      Power of :    POLST:    ---------------------------------------------------------------------------------------  Care Time Delivered:   Upon my evaluation, this patient had a high probability of imminent or life-threatening deterioration due to DTs, which required my direct attention, intervention, and personal management  I have personally provided 30 minutes (0010 to 0040) of critical care time, exclusive of procedures, teaching, family meetings, and any prior time recorded by providers other than myself  James OfficerGRACIELA      Portions of the record may have been created with voice recognition software  Occasional wrong word or "sound a like" substitutions may have occurred due to the inherent limitations of voice recognition software    Read the chart carefully and recognize, using context, where substitutions have occurred

## 2022-06-04 NOTE — ASSESSMENT & PLAN NOTE
· On admission patient reports 3-5 shots of vodka per day, does report that she has drank more heavily over the holiday weekend and this past week prior to admission    · No history of seizures secondary to withdrawal  · Does have a history of prior inpatient rehab stays  · Alcohol level on admission was 337  · 6/3 On the floor was on CIWA protocol but noted to have a sudden increase an CIWA score this evening, up to 29 from 7 on prior exams  · Patient was very anxious, pacing room, delusional, hallucinating, tremors despite ativan and librium  · Tx'd to SD1 for further care  · Given phenobarbital 260 mg IV once overnight with improvement, pt sleeping   · will continue to dose phenobarbital 130 mg IV as needed for symptom control  · Conservative for Wernicke encephalopathy:  3/6 Started high-dose thiamine 500 mg IV q 8 hours for 3 days  · Continue folic acid supplement and multivitamin  · One-to-one observation

## 2022-06-04 NOTE — QUICK NOTE
Patient seen and evaluated by Critical Care today and deemed to be appropriate for transfer to [facility]   Spoke to Dr Denzel Bolden from [address] [place] [address] to accept patient transfer via [facility]  Major changes to treatment plan from the day of transfer include diet advanced, pending MRI, continue HD thiamine and folic acid for wernicke's encephalopathy    Critical care can be contacted via [employer] with any questions or concerns

## 2022-06-04 NOTE — DISCHARGE SUMMARY
[place]  Discharge- Elizabeth Mantle 1985, 30 y o  female MRN: [id number]  Unit/Bed#: -01 Encounter: [id number]  Primary Care Provider: Lennie Sims DO   Date and time admitted to hospital: 6/2/2022  3:20 PM    * Diplopia  Assessment & Plan  · Patient with complaints of diplopia and blurry vision in her left eye x3 days prior to admission  Concern for MS flare verses optic neuritis  · Neurology following  · 6/2 Started on pulse dose steroids with Solu-Medrol 1000 mg daily, currently day 3/3  · Continue sliding scale insulin for glycemic control with pulse steroids  · MRI brain/orbits for optic neuritis ordered and final read pending-read by neuro and WNL  · 6/3 LP performed by IR with low opening pressure  · minimally elevated protein at 51, glucose slightly elevated at 91, WBC 0, normal RBC, ME panel negative, culture/g stain preliminarily with no growth  · pending studies include MS panel, NMO, protein electrophoresis, ACE  · Routine neuro exams and delirium precautions    PT left AMA      Alcohol withdrawal delirium, acute, hyperactive ([facility] [address] )  Assessment & Plan  · On admission patient reports 3-5 shots of vodka per day, does report that she has drank more heavily over the holiday weekend and this past week prior to admission  ? No history of seizures secondary to withdrawal  ? Does have a history of prior inpatient rehab stays  · Alcohol level on admission was 337  · 6/3 On the floor was on CIWA protocol but noted to have a sudden increase an CIWA score evening of 6/3, up to 29 from 7 on prior exams  ?  Patient was very anxious, pacing room, delusional, hallucinating, tremors despite ativan and librium  · Tx'd to SD1 for further care  · Given phenobarbital 260 mg IV once overnight with improvement, pt sleeping   · will continue to dose phenobarbital 130 mg IV as needed for symptom control  · Conservative for Wernicke encephalopathy:  3/6 Started high-dose thiamine 500 mg IV q 8 hours for 3 days  · Continue folic acid supplement and multivitamin  · One-to-one observation       Right flank pain  Assessment & Plan  · On admission patient reported chronic right flank pain since 2019  Also noted to have nausea/vomiting x3 days prior to admission  · RUQ US: 1  Hepatomegaly with moderately increased liver echotexture consistent with hepatic steatosis  2  Unremarkable liver Doppler  · GI consulted      ETOH abuse  Assessment & Plan  · Ongoing alcohol abuse, patient refused rehab and psych evaluation on admission  · Further details of plan as discussed above under alcohol withdrawal       Essential hypertension  Assessment & Plan  · Supposed to be On bisoprolol 15 mg at home, but she reports that she has not been taking that  · Bisoprolol restarted this admission, continue     Generalized anxiety disorder  Assessment & Plan  · Home medication is Effexor 150 mg daily and follows with psych as outpatient  · Continue outpatient follow-up  · Home medications held for now due to active alcohol withdrawal    Hypothyroidism  Assessment & Plan  · Patient with non-compliance at home - TSH elevated and T4 low this admission  · Restarted home levothyroxine        Medical Problems             Resolved Problems  Date Reviewed: 6/4/2022   None               Discharging Physician / Practitioner: Sara Sharpe MD  PCP: Alycia Fragoso DO  Admission Date:   Admission Orders (From admission, onward)     Ordered        06/02/22 1843  INPATIENT ADMISSION  Once                      Discharge Date: 06/04/22    Consultations During Hospital Stay:  · Neurology   · Critical care  · Gastroenterology    Procedures Performed:      [address] right upper quadrant with liver dopplers   Final Result by Jennifer Etienne MD (06/03 5916)      1  Hepatomegaly with moderately increased liver echotexture consistent with hepatic steatosis  2   Unremarkable liver Doppler        Workstation performed: [id number] IR lumbar puncture   Final Result by Riddhi Farah MD (06/03 6327)   Impression:       Successful fluoroscopic-guided lumbar puncture  Workstation performed: [id number]         MRI brain and orbits wo and w contrast    (Results Pending)   ·       Significant Findings / Test Results:   · RUQ US: hepatomegaly with moderately increased echotexture consistent with hepatic steatosis    Incidental Findings:   · None    Test Results Pending at Discharge (will require follow up):   · MRI brain/orbits  · MS panel, NMO, protein electrophoresis, Ace     Outpatient Tests Requested:  · None    Complications:  Acute alcohol withdrawal requiring transfer to Critical Care on 06/03, Patient leaving [facility]    Reason for Admission:  Diplopia and abdominal pain    Hospital Course:   Raheem Hinojosa is a 30 y o  female patient with PMH of alcoholism, hypothyroidism, depression, hypertension who originally presented to the hospital on 6/2/2022 due to acute on chronic right upper quadrant/right flank pain with associated nausea/vomiting x3 days in addition to blurry vision and horizontal diplopia in left eye  Notably the patient has history of chronic alcohol abuse with prior inpatient admission/prior rehab who had been self weaning her daily intake of alcohol since April 2022 with acute relapse shortly prior to admission  The patient was admitted with Neurology consult for workup for possible multiple sclerosis as patient has strong family history  An MRI of the brain/orbits was obtained and read as unremarkable by Neurology team however remains pending final read at discharge as patient left AMA  The patient also received a lumbar puncture on 06/03 which revealed minimally elevated protein at 51, glucose slightly elevated at 91, normal WBC/RBC, negative any panel, culture/Gram stain preliminarily without growth  Pending studies at discharge include MS panel, NMO, protein electrophoresis, ACE    Throughout admission patient received Solu-Medrol 1 g daily for total of 3 days  Regarding the patient's right sided abdominal pain a right upper quadrant ultrasound was obtained revealing hepatic steatosis with normal liver Doppler study  Notably liver function studies revealed mild transaminitis and hepatitis panel was negative  Gastroenterology team was consulted however was not able to evaluate patient/off recommendations as patient left AMA prior to formal consultation taking place  On evening of 6/3 patient experienced acute worsening of alcohol withdrawal symptoms as evidenced clinically by new onset AH/VH and agitation as well as CIWA score increasing from 7-29  Patient was treated with IV phenobarb for suspected Wernicke's encephalopathy prior to patient leaving AMA  Notably the patient had also reported noncompliance with prior medications including home levothyroxine resulting in elevated TSH/low T4 on admission  Patient was restarted on home levothyroxine and home antihypertensives while admitted  The patient was educated regarding severe medical conditions and high risk of seizure, death, other adverse outcome without continued treatment for the above conditions  Patient signed AMA paperwork on 06/04 despite risks verbalized patient  Please see above list of diagnoses and related plan for additional information  Condition at Discharge: guarded    Discharge Day Visit / Exam:   Subjective:  Patient was extensively educated regarding severity of her symptoms requiring IV medications due to severe alcohol withdrawal   Although patient does not have history of alcohol withdrawal seizures due to her present symptoms she is at high risk for seizure and/or death if patient leaves against medical advice   and children were at bedside during time of discussion, has been encouraged patient to stay in hospital due to present illness    Ultimately patient decided to leave against medical advice and signed paperwork with reason including anxiety, poor sleep and discomfort in hospital     Vitals: Blood Pressure: 159/95 (06/04/22 1500)  Pulse: 79 (06/04/22 1500)  Temperature: 98 °F (36 7 °C) (06/04/22 1500)  Temp Source: Oral (06/04/22 1500)  Respirations: 15 (06/04/22 1500)  Height: 5' 4" (162 6 cm) (06/02/22 1513)  Weight - Scale: 113 kg (250 lb) (06/02/22 1513)  SpO2: 97 % (06/04/22 1500)      Discussion with Family: Updated  () at bedside  Discharge instructions/Information to patient and family:   See after visit summary for information provided to patient and family  Provisions for Follow-Up Care:  See after visit summary for information related to follow-up care and any pertinent home health orders  Disposition:   Home AMA    Planned Readmission: none     Discharge Statement:  I spent 30 minutes discharging the patient  This time was spent on the day of discharge  I had direct contact with the patient on the day of discharge  Greater than 50% of the total time was spent examining patient, answering all patient questions, arranging and discussing plan of care with patient as well as directly providing post-discharge instructions  Additional time then spent on discharge activities  Discharge Medications:  See after visit summary for reconciled discharge medications provided to patient and/or family        **Please Note: This note may have been constructed using a voice recognition system**

## 2022-06-04 NOTE — PLAN OF CARE
Problem: PAIN - ADULT  Goal: Verbalizes/displays adequate comfort level or baseline comfort level  Description: Interventions:  - Encourage patient to monitor pain and request assistance  - Assess pain using appropriate pain scale  - Administer analgesics based on type and severity of pain and evaluate response  - Implement non-pharmacological measures as appropriate and evaluate response  - Consider cultural and social influences on pain and pain management  - Notify physician/advanced practitioner if interventions unsuccessful or patient reports new pain  Outcome: Progressing     Problem: INFECTION - ADULT  Goal: Absence or prevention of progression during hospitalization  Description: INTERVENTIONS:  - Assess and monitor for signs and symptoms of infection  - Monitor lab/diagnostic results  - Monitor all insertion sites, i e  indwelling lines, tubes, and drains  - Monitor endotracheal if appropriate and nasal secretions for changes in amount and color  - [place] appropriate cooling/warming therapies per order  - Administer medications as ordered  - Instruct and encourage patient and family to use good hand hygiene technique  - Identify and instruct in appropriate isolation precautions for identified infection/condition  Outcome: Progressing  Goal: Absence of fever/infection during neutropenic period  Description: INTERVENTIONS:  - Monitor WBC    Outcome: Progressing     Problem: DISCHARGE PLANNING  Goal: Discharge to home or other facility with appropriate resources  Description: INTERVENTIONS:  - Identify barriers to discharge w/patient and caregiver  - Arrange for needed discharge resources and transportation as appropriate  - Identify discharge learning needs (meds, wound care, etc )  - Arrange for interpretive services to assist at discharge as needed  - Refer to Case Management Department for coordinating discharge planning if the patient needs post-hospital services based on physician/advanced practitioner order or complex needs related to functional status, cognitive ability, or social support system  Outcome: Progressing     Problem: Knowledge Deficit  Goal: Patient/family/caregiver demonstrates understanding of disease process, treatment plan, medications, and discharge instructions  Description: Complete learning assessment and assess knowledge base  Interventions:  - Provide teaching at level of understanding  - Provide teaching via preferred learning methods  Outcome: Progressing     Problem: Nutrition/Hydration-ADULT  Goal: Nutrient/Hydration intake appropriate for improving, restoring or maintaining nutritional needs  Description: Monitor and assess patient's nutrition/hydration status for malnutrition  Collaborate with interdisciplinary team and initiate plan and interventions as ordered  Monitor patient's weight and dietary intake as ordered or per policy  Utilize nutrition screening tool and intervene as necessary  Determine patient's food preferences and provide high-protein, high-caloric foods as appropriate       INTERVENTIONS:  - Monitor oral intake, urinary output, labs, and treatment plans  - Assess nutrition and hydration status and recommend course of action  - Evaluate amount of meals eaten  - Assist patient with eating if necessary   - Allow adequate time for meals  - Recommend/ encourage appropriate diets, oral nutritional supplements, and vitamin/mineral supplements  - Order, calculate, and assess calorie counts as needed  - Recommend, monitor, and adjust tube feedings and TPN/PPN based on assessed needs  - Assess need for intravenous fluids  - Provide specific nutrition/hydration education as appropriate  - Include patient/family/caregiver in decisions related to nutrition  Outcome: Progressing

## 2022-06-04 NOTE — ASSESSMENT & PLAN NOTE
· Patient with complaints of diplopia and blurry vision in her left eye x3 days prior to admission    Concern for MS flare verses optic neuritis  · Neurology following  · 6/2 Started on pulse dose steroids with Solu-Medrol 1000 mg daily, currently day 3/3  · Continue sliding scale insulin for glycemic control with pulse steroids  · MRI brain/orbits for optic neuritis ordered and final read pending-read by neuro and WNL  · 6/3 LP performed by IR with low opening pressure  · minimally elevated protein at 51, glucose slightly elevated at 91, WBC 0, normal RBC, ME panel negative, culture/g stain preliminarily with no growth  · pending studies include MS panel, NMO, protein electrophoresis, ACE  · Routine neuro exams and delirium precautions    PT left AMA

## 2022-06-04 NOTE — PLAN OF CARE
Problem: PAIN - ADULT  Goal: Verbalizes/displays adequate comfort level or baseline comfort level  Description: Interventions:  - Encourage patient to monitor pain and request assistance  - Assess pain using appropriate pain scale  - Administer analgesics based on type and severity of pain and evaluate response  - Implement non-pharmacological measures as appropriate and evaluate response  - Consider cultural and social influences on pain and pain management  - Notify physician/advanced practitioner if interventions unsuccessful or patient reports new pain  Outcome: Progressing     Problem: INFECTION - ADULT  Goal: Absence or prevention of progression during hospitalization  Description: INTERVENTIONS:  - Assess and monitor for signs and symptoms of infection  - Monitor lab/diagnostic results  - Monitor all insertion sites, i e  indwelling lines, tubes, and drains  - Monitor endotracheal if appropriate and nasal secretions for changes in amount and color  - [place] appropriate cooling/warming therapies per order  - Administer medications as ordered  - Instruct and encourage patient and family to use good hand hygiene technique  - Identify and instruct in appropriate isolation precautions for identified infection/condition  Outcome: Progressing  Goal: Absence of fever/infection during neutropenic period  Description: INTERVENTIONS:  - Monitor WBC    Outcome: Progressing     Problem: SAFETY ADULT  Goal: Patient will remain free of falls  Description: INTERVENTIONS:  - Educate patient/family on patient safety including physical limitations  - Instruct patient to call for assistance with activity   - Consult OT/PT to assist with strengthening/mobility   - Keep Call bell within reach  - Keep bed low and locked with side rails adjusted as appropriate  - Keep care items and personal belongings within reach  - Initiate and maintain comfort rounds  - Make Fall Risk Sign visible to staff  - Offer Toileting every 2 Hours, in advance of need  - Initiate/Maintain alarm  - Obtain necessary fall risk management equipment:   - Apply yellow socks and bracelet for high fall risk patients  - Consider moving patient to room near nurses station  Outcome: Progressing  Goal: Maintain or return to baseline ADL function  Description: INTERVENTIONS:  -  Assess patient's ability to carry out ADLs; assess patient's baseline for ADL function and identify physical deficits which impact ability to perform ADLs (bathing, care of mouth/teeth, toileting, grooming, dressing, etc )  - Assess/evaluate cause of self-care deficits   - Assess range of motion  - Assess patient's mobility; develop plan if impaired  - Assess patient's need for assistive devices and provide as appropriate  - Encourage maximum independence but intervene and supervise when necessary  - Involve family in performance of ADLs  - Assess for home care needs following discharge   - Consider OT consult to assist with ADL evaluation and planning for discharge  - Provide patient education as appropriate  Outcome: Progressing  Goal: Maintains/Returns to pre admission functional level  Description: INTERVENTIONS:  - Perform BMAT or MOVE assessment daily    - Set and communicate daily mobility goal to care team and patient/family/caregiver  - Collaborate with rehabilitation services on mobility goals if consulted  - Perform Range of Motion 3 times a day  - Reposition patient every 2 hours    - Dangle patient 3 times a day  - Stand patient 3 times a day  - Ambulate patient 3 times a day  - Out of bed to chair 3 times a day   - Out of bed for meals 3 times a day  - Out of bed for toileting  - Record patient progress and toleration of activity level   Outcome: Progressing     Problem: DISCHARGE PLANNING  Goal: Discharge to home or other facility with appropriate resources  Description: INTERVENTIONS:  - Identify barriers to discharge w/patient and caregiver  - Arrange for needed discharge resources and transportation as appropriate  - Identify discharge learning needs (meds, wound care, etc )  - Arrange for interpretive services to assist at discharge as needed  - Refer to Case Management Department for coordinating discharge planning if the patient needs post-hospital services based on physician/advanced practitioner order or complex needs related to functional status, cognitive ability, or social support system  Outcome: Progressing     Problem: Knowledge Deficit  Goal: Patient/family/caregiver demonstrates understanding of disease process, treatment plan, medications, and discharge instructions  Description: Complete learning assessment and assess knowledge base    Interventions:  - Provide teaching at level of understanding  - Provide teaching via preferred learning methods  Outcome: Progressing     Problem: NEUROSENSORY - ADULT  Goal: Achieves stable or improved neurological status  Description: INTERVENTIONS  - Monitor and report changes in neurological status  - Monitor vital signs such as temperature, blood pressure, glucose, and any other labs ordered   - Initiate measures to prevent increased intracranial pressure  - Monitor for seizure activity and implement precautions if appropriate      Outcome: Progressing  Goal: Remains free of injury related to seizures activity  Description: INTERVENTIONS  - Maintain airway, patient safety  and administer oxygen as ordered  - Monitor patient for seizure activity, document and report duration and description of seizure to physician/advanced practitioner  - If seizure occurs,  ensure patient safety during seizure  - Reorient patient post seizure  - Seizure pads on all 4 side rails  - Instruct patient/family to notify RN of any seizure activity including if an aura is experienced  - Instruct patient/family to call for assistance with activity based on nursing assessment  - Administer anti-seizure medications if ordered    Outcome: Progressing  Goal: Achieves maximal functionality and self care  Description: INTERVENTIONS  - Monitor swallowing and airway patency with patient fatigue and changes in neurological status  - Encourage and assist patient to increase activity and self care  - Encourage visually impaired, hearing impaired and aphasic patients to use assistive/communication devices  Outcome: Progressing     Problem: Potential for Falls  Goal: Patient will remain free of falls  Description: INTERVENTIONS:  - Educate patient/family on patient safety including physical limitations  - Instruct patient to call for assistance with activity   - Consult OT/PT to assist with strengthening/mobility   - Keep Call bell within reach  - Keep bed low and locked with side rails adjusted as appropriate  - Keep care items and personal belongings within reach  - Initiate and maintain comfort rounds  - Make Fall Risk Sign visible to staff  - Offer Toileting every 2 Hours, in advance of need  - Initiate/Maintain alarm  - Obtain necessary fall risk management equipment:   - Apply yellow socks and bracelet for high fall risk patients  - Consider moving patient to room near nurses station  Outcome: Progressing     Problem: Nutrition/Hydration-ADULT  Goal: Nutrient/Hydration intake appropriate for improving, restoring or maintaining nutritional needs  Description: Monitor and assess patient's nutrition/hydration status for malnutrition  Collaborate with interdisciplinary team and initiate plan and interventions as ordered  Monitor patient's weight and dietary intake as ordered or per policy  Utilize nutrition screening tool and intervene as necessary  Determine patient's food preferences and provide high-protein, high-caloric foods as appropriate       INTERVENTIONS:  - Monitor oral intake, urinary output, labs, and treatment plans  - Assess nutrition and hydration status and recommend course of action  - Evaluate amount of meals eaten  - Assist patient with eating if necessary   - Allow adequate time for meals  - Recommend/ encourage appropriate diets, oral nutritional supplements, and vitamin/mineral supplements  - Order, calculate, and assess calorie counts as needed  - Recommend, monitor, and adjust tube feedings and TPN/PPN based on assessed needs  - Assess need for intravenous fluids  - Provide specific nutrition/hydration education as appropriate  - Include patient/family/caregiver in decisions related to nutrition  Outcome: Progressing     Problem: MOBILITY - ADULT  Goal: Maintain or return to baseline ADL function  Description: INTERVENTIONS:  -  Assess patient's ability to carry out ADLs; assess patient's baseline for ADL function and identify physical deficits which impact ability to perform ADLs (bathing, care of mouth/teeth, toileting, grooming, dressing, etc )  - Assess/evaluate cause of self-care deficits   - Assess range of motion  - Assess patient's mobility; develop plan if impaired  - Assess patient's need for assistive devices and provide as appropriate  - Encourage maximum independence but intervene and supervise when necessary  - Involve family in performance of ADLs  - Assess for home care needs following discharge   - Consider OT consult to assist with ADL evaluation and planning for discharge  - Provide patient education as appropriate  Outcome: Progressing  Goal: Maintains/Returns to pre admission functional level  Description: INTERVENTIONS:  - Perform BMAT or MOVE assessment daily    - Set and communicate daily mobility goal to care team and patient/family/caregiver  - Collaborate with rehabilitation services on mobility goals if consulted  - Perform Range of Motion 3 times a day  - Reposition patient every 2 hours    - Dangle patient 3 times a day  - Stand patient 3 times a day  - Ambulate patient 3 times a day  - Out of bed to chair 3 times a day   - Out of bed for meals 3 times a day  - Out of bed for toileting  - Record patient progress and toleration of activity level   Outcome: Progressing

## 2022-06-04 NOTE — QUICK NOTE
Notified of CIWA of 29 and pt experiencing visual and auditory hallucinations  Pt upgraded to SD2 and lithium adjusted to 25mg q6h

## 2022-06-04 NOTE — ASSESSMENT & PLAN NOTE
· Patient with complaints of diplopia and blurry vision in her left eye x3 days prior to admission    Concern for MS flare verses optic neuritis  · Neurology following  · Started on pulse dose steroids with Solu-Medrol 1000 mg daily, currently day 2  · Start sliding scale insulin for glycemic control with pulse steroids  · MRI brain/orbits for optic neuritis ordered and pending  · MS panel pending  · 6/3 LP performed by IR with low opening pressure, follow-up results  · Starting routine neuro exams and delirium precautions

## 2022-06-04 NOTE — ASSESSMENT & PLAN NOTE
· Home medication is Effexor 150 mg daily and follows with psych as outpatient  · Continue outpatient follow-up  · Home medications held for now due to active alcohol withdrawal

## 2022-06-04 NOTE — ASSESSMENT & PLAN NOTE
· On admission patient reported chronic right flank pain since 2019  Also noted to have nausea/vomiting x3 days prior to admission  · RUQ US: 1  Hepatomegaly with moderately increased liver echotexture consistent with hepatic steatosis  2  Unremarkable liver Doppler    · GI consulted

## 2022-06-04 NOTE — ASSESSMENT & PLAN NOTE
· Patient with non-compliance at home - TSH elevated and T4 low this admission  · Restarted home levothyroxine

## 2022-06-04 NOTE — NURSING NOTE
Upon entering Pts room for beginning of shift to give a scheduled medication, pt came out into hallway with Iv pole and pump stating her pump is playing lullabies and rock music  Pt was visually agitated, sweaty, shaking and pacing  Pt scored a CIWA of 34 and RN made provider aware  Upon giving protocol ativan order, pt asked why there was a red headed baby down the fontanez  Pts level of care was updated to [facility]  RN called unit to give report

## 2022-06-04 NOTE — ASSESSMENT & PLAN NOTE
· Ongoing alcohol abuse, patient refused rehab and psych evaluation on admission  · Further details of plan as discussed above under alcohol withdrawal

## 2022-06-04 NOTE — ASSESSMENT & PLAN NOTE
· Ongoing alcohol abuse, patient refused rehab and psych evaluation on admission  · Further details of plan as discussed above under alcohol withdrawal Home

## 2022-06-04 NOTE — DISCHARGE SUMMARY
[place]  Discharge- Delorise Maribel 1985, 30 y o  female MRN: [id number]  Unit/Bed#: -01 Encounter: [id number]  Primary Care Provider: Steven Hamilton DO   Date and time admitted to hospital: 6/2/2022  3:20 PM    * Diplopia  Assessment & Plan  · Patient with complaints of diplopia and blurry vision in her left eye x3 days prior to admission  Concern for MS flare verses optic neuritis  · Neurology following  · 6/2 Started on pulse dose steroids with Solu-Medrol 1000 mg daily, currently day 3/3  · Continue sliding scale insulin for glycemic control with pulse steroids  · MRI brain/orbits for optic neuritis ordered and final read pending-read by neuro and WNL  · 6/3 LP performed by IR with low opening pressure  · minimally elevated protein at 51, glucose slightly elevated at 91, WBC 0, normal RBC, ME panel negative, culture/g stain preliminarily with no growth  · pending studies include MS panel, NMO, protein electrophoresis, ACE  · Routine neuro exams and delirium precautions    PT left AMA      Alcohol withdrawal delirium, acute, hyperactive ([facility] [address] )  Assessment & Plan  · On admission patient reports 3-5 shots of vodka per day, does report that she has drank more heavily over the holiday weekend and this past week prior to admission  ? No history of seizures secondary to withdrawal  ? Does have a history of prior inpatient rehab stays  · Alcohol level on admission was 337  · 6/3 On the floor was on CIWA protocol but noted to have a sudden increase an CIWA score evening of 6/3, up to 29 from 7 on prior exams  ?  Patient was very anxious, pacing room, delusional, hallucinating, tremors despite ativan and librium  · Tx'd to SD1 for further care  · Given phenobarbital 260 mg IV once overnight with improvement, pt sleeping   · will continue to dose phenobarbital 130 mg IV as needed for symptom control  · Conservative for Wernicke encephalopathy:  3/6 Started high-dose thiamine 500 mg IV q 8 hours for 3 days  · Continue folic acid supplement and multivitamin  · One-to-one observation       Right flank pain  Assessment & Plan  · On admission patient reported chronic right flank pain since 2019  Also noted to have nausea/vomiting x3 days prior to admission  · RUQ US: 1  Hepatomegaly with moderately increased liver echotexture consistent with hepatic steatosis  2  Unremarkable liver Doppler  · GI consulted      ETOH abuse  Assessment & Plan  · Ongoing alcohol abuse, patient refused rehab and psych evaluation on admission  · Further details of plan as discussed above under alcohol withdrawal       Essential hypertension  Assessment & Plan  · Supposed to be On bisoprolol 15 mg at home, but she reports that she has not been taking that  · Bisoprolol restarted this admission, continue     Generalized anxiety disorder  Assessment & Plan  · Home medication is Effexor 150 mg daily and follows with psych as outpatient  · Continue outpatient follow-up  · Home medications held for now due to active alcohol withdrawal    Hypothyroidism  Assessment & Plan  · Patient with non-compliance at home - TSH elevated and T4 low this admission  · Restarted home levothyroxine        Medical Problems             Resolved Problems  Date Reviewed: 6/4/2022   None               Discharging Physician / Practitioner: Cat Betancourt PA-C  PCP: Rubina Hdez DO  Admission Date:   Admission Orders (From admission, onward)     Ordered        06/02/22 1843  INPATIENT ADMISSION  Once                      Discharge Date: 06/04/22    Consultations During Hospital Stay:  · Neurology   · Critical care  · Gastroenterology    Procedures Performed:      [address] right upper quadrant with liver dopplers   Final Result by García Hough MD (06/03 6023)      1  Hepatomegaly with moderately increased liver echotexture consistent with hepatic steatosis  2   Unremarkable liver Doppler        Workstation performed: [id number]         IR lumbar puncture   Final Result by Macey Pedroza MD (06/03 4171)   Impression:       Successful fluoroscopic-guided lumbar puncture  Workstation performed: [id number]         MRI brain and orbits wo and w contrast    (Results Pending)   ·       Significant Findings / Test Results:   · RUQ US: hepatomegaly with moderately increased echotexture consistent with hepatic steatosis    Incidental Findings:   · None    Test Results Pending at Discharge (will require follow up):   · MRI brain/orbits  · MS panel, NMO, protein electrophoresis, Ace     Outpatient Tests Requested:  · None    Complications:  Acute alcohol withdrawal requiring transfer to Critical Care on 06/03, Patient leaving [facility]    Reason for Admission:  Diplopia and abdominal pain    Hospital Course:   Velia Lucero is a 30 y o  female patient with PMH of alcoholism, hypothyroidism, depression, hypertension who originally presented to the hospital on 6/2/2022 due to acute on chronic right upper quadrant/right flank pain with associated nausea/vomiting x3 days in addition to blurry vision and horizontal diplopia in left eye  Notably the patient has history of chronic alcohol abuse with prior inpatient admission/prior rehab who had been self weaning her daily intake of alcohol since April 2022 with acute relapse shortly prior to admission  The patient was admitted with Neurology consult for workup for possible multiple sclerosis as patient has strong family history  An MRI of the brain/orbits was obtained and read as unremarkable by Neurology team however remains pending final read at discharge as patient left AMA  The patient also received a lumbar puncture on 06/03 which revealed minimally elevated protein at 51, glucose slightly elevated at 91, normal WBC/RBC, negative any panel, culture/Gram stain preliminarily without growth  Pending studies at discharge include MS panel, NMO, protein electrophoresis, ACE  Throughout admission patient received Solu-Medrol 1 g daily for total of 3 days  Regarding the patient's right sided abdominal pain a right upper quadrant ultrasound was obtained revealing hepatic steatosis with normal liver Doppler study  Notably liver function studies revealed mild transaminitis and hepatitis panel was negative  Gastroenterology team was consulted however was not able to evaluate patient/off recommendations as patient left AMA prior to formal consultation taking place  On evening of 6/3 patient experienced acute worsening of alcohol withdrawal symptoms as evidenced clinically by new onset AH/VH and agitation as well as CIWA score increasing from 7-29  Patient was treated with IV phenobarb for suspected Wernicke's encephalopathy prior to patient leaving AMA  Notably the patient had also reported noncompliance with prior medications including home levothyroxine resulting in elevated TSH/low T4 on admission  Patient was restarted on home levothyroxine and home antihypertensives while admitted  The patient was educated regarding severe medical conditions and high risk of seizure, death, other adverse outcome without continued treatment for the above conditions  Patient signed AMA paperwork on 06/04 despite risks verbalized patient  Please see above list of diagnoses and related plan for additional information  Condition at Discharge: guarded    Discharge Day Visit / Exam:   Subjective:  Patient was extensively educated by physician regarding severity of her symptoms requiring IV medications due to severe alcohol withdrawal   Although patient does not have history of alcohol withdrawal seizures due to her present symptoms she is at high risk for seizure and/or death if patient leaves against medical advice   and children were at bedside during time of discussion, has been encouraged patient to stay in hospital due to present illness    Ultimately patient decided to leave against medical advice and signed paperwork with reason including anxiety, poor sleep and discomfort in hospital     Vitals: Blood Pressure: 159/95 (06/04/22 1500)  Pulse: 79 (06/04/22 1500)  Temperature: 98 °F (36 7 °C) (06/04/22 1500)  Temp Source: Oral (06/04/22 1500)  Respirations: 15 (06/04/22 1500)  Height: 5' 4" (162 6 cm) (06/02/22 1513)  Weight - Scale: 113 kg (250 lb) (06/02/22 1513)  SpO2: 97 % (06/04/22 1500)      Discussion with Family: Updated  () at bedside  Discharge instructions/Information to patient and family:   See after visit summary for information provided to patient and family  Provisions for Follow-Up Care:  See after visit summary for information related to follow-up care and any pertinent home health orders  Disposition:   Home AMA    Planned Readmission: none     Discharge Statement:  I spent 30 minutes discharging the patient  This time was spent on the day of discharge  I had direct contact with the patient on the day of discharge  Greater than 50% of the total time was spent examining patient, answering all patient questions, arranging and discussing plan of care with patient as well as directly providing post-discharge instructions  Additional time then spent on discharge activities  Discharge Medications:  See after visit summary for reconciled discharge medications provided to patient and/or family        **Please Note: This note may have been constructed using a voice recognition system**

## 2022-06-04 NOTE — ASSESSMENT & PLAN NOTE
· On admission patient reports 3-5 shots of vodka per day, does report that she has drank more heavily over the holiday weekend and this past week prior to admission  ? No history of seizures secondary to withdrawal  ? Does have a history of prior inpatient rehab stays  · Alcohol level on admission was 337  · 6/3 On the floor was on CIWA protocol but noted to have a sudden increase an CIWA score evening of 6/3, up to 29 from 7 on prior exams  ?  Patient was very anxious, pacing room, delusional, hallucinating, tremors despite ativan and librium  · Tx'd to SD1 for further care  · Given phenobarbital 260 mg IV once overnight with improvement, pt sleeping   · will continue to dose phenobarbital 130 mg IV as needed for symptom control  · Conservative for Wernicke encephalopathy:  3/6 Started high-dose thiamine 500 mg IV q 8 hours for 3 days  · Continue folic acid supplement and multivitamin  · One-to-one observation

## 2022-06-04 NOTE — ASSESSMENT & PLAN NOTE
· Supposed to be On bisoprolol 15 mg at home, but she reports that she has not been taking that  · Bisoprolol restarted this admission, continue

## 2022-06-04 NOTE — PROGRESS NOTES
Progress Note - Neurology   Susan Ayala 30 y o  female MRN: [id number]  Unit/Bed#: -01 Encounter: [id number]      Assessment/Plan   * Diplopia  Assessment & Plan  44-year-old female with history of notable alcohol abuse, obesity, diabetes, thyroid dysfunction, presenting initially on 06/02 following multiple complaints including chronic right-sided chest/flank pain, shortness of breath  Neurology asked to evaluate regarding diplopia/blurred vision (family history of MS)  Also with ongoing alcohol abuse  6/3-6/4:  ETOH withdrawal/delirium overnight, upgraded care to step-down/ICU monitoring with CIWA, high-dose thiamine/folic acid (treating for Wernicke's), phenobarbital, librium  Await brain/orbital imaging this afternoon  Vision minimally improved over past day or two following treatment  Plan:  -LP/CSF analysis performed yesterday, results thus far include:   -minimally elevated protein at 51, glucose slightly elevated at 91, WBC 0, normal RBC, ME panel negative, culture/g stain preliminarily with no growth   -pending studies include MS panel, NMO, protein electrophoresis, ACE  -MRI brain/orbits pending for this afternoon (Ativan ordered pre imaging for claustrophobia)  -status post Solu-Medrol 1 G daily initiation, dose 3/3 this evening  -GI workup for right flank pain, ultrasound pending, trending LFTs/CMP  -withdrawal management for alcohol abuse,    -CIWA protocol, high-dose thiamine, folic acid, Librium, phenobarbital  -lab work resulted:   -Lyme negative   -TSH greater than 4, T4 slightly low (patient notes noncompliance with levothyroxine)   -UDS positive for THC   -CRP, ESR both WNL  -lab work pending   -B1, B12, MMA  -supportive care    Will further discuss plan of care with attending neurologist      Recommendations for outpatient neurological follow up have yet to be determined  Subjective:   Patient resting in bed, doing ok today   Notes vision still blurry/needs to correct with EOM's, but feels it is slightly better than past few days  Reviewed notes, with ETOH withdrawal and delirium overnight (patient felt diaphoretic, clammy), with escalation of withdrawal management, she feels more comfortable today  No new neurologic symptoms/deficits otherwise  Vitals: Blood pressure 126/76, pulse 73, temperature 97 9 °F (36 6 °C), temperature source Axillary, resp  rate 16, height 5' 4" (1 626 m), weight 113 kg (250 lb), SpO2 97 %  ,Body mass index is 42 91 kg/m²  Examined alongside Dr Nimisha Mcdonough  Physical Exam:  Physical Exam  Constitutional:       Appearance: She is obese  She is diaphoretic (mildly diaphoretic)  HENT:      Head: Normocephalic and atraumatic  Eyes:      Extraocular Movements: Extraocular movements intact and EOM normal       Conjunctiva/sclera: Conjunctivae normal       Pupils: Pupils are equal, round, and reactive to light  Cardiovascular:      Rate and Rhythm: Normal rate and regular rhythm  Pulmonary:      Effort: Pulmonary effort is normal    Abdominal:      General: There is no distension  Musculoskeletal:      Cervical back: Normal range of motion and neck supple  Neurological:      Mental Status: She is alert and oriented to person, place, and time  Coordination: Finger-Nose-Finger Test and Heel to [facility] Test normal       Deep Tendon Reflexes: Strength normal    Psychiatric:         Speech: Speech normal         Neurologic Exam     Mental Status   Oriented to person, place, and time  Attention: normal  Concentration: normal    Speech: speech is normal   Normal comprehension  Cranial Nerves     CN II   Visual fields full to confrontation  CN III, IV, VI   Pupils are equal, round, and reactive to light  Extraocular motions are normal      CN V   Facial sensation intact  CN VII   Facial expression full, symmetric       CN VIII   CN VIII normal      CN IX, X   CN IX normal    CN X normal      CN XI   CN XI normal      CN XII   CN XII normal      Motor Exam   Muscle bulk: normal  Overall muscle tone: normal  Right arm pronator drift: absent  Left arm pronator drift: absent    Strength   Strength 5/5 throughout  Sensory Exam   Light touch normal      Gait, Coordination, and Reflexes     Coordination   Finger to nose coordination: normal  Heel to shin coordination: normal    Tremor   Resting tremor: absent  Intention tremor: absent  Gait deferred for safety  Lab, Imaging and other studies:   No new neuroimaging at time of evaluation  CBC:   Results from last 7 days   Lab Units 06/04/22  0521 06/03/22  0356 06/02/22  1600   WBC Thousand/uL 4 55 2  35* 5 22   RBC Million/uL 4 28 4 80 4 72   HEMOGLOBIN g/dL 13 6 15 0 14 9   HEMATOCRIT % 41 2 45 2 44 6   MCV fL 96 94 95   PLATELETS Thousands/uL 101* 111* 125*   , BMP/CMP:   Results from last 7 days   Lab Units 06/04/22  0521 06/03/22  1939 06/03/22  0356 06/02/22  1600   SODIUM mmol/L 134* 135* 135* 138   POTASSIUM mmol/L 4 3 4 3 4 5 4 0   CHLORIDE mmol/L 100 101 98* 101   CO2 mmol/L 18* 19* 19* 23   BUN mg/dL 7 10 8 10   CREATININE mg/dL 0 61 0 78 0 67 0 67   CALCIUM mg/dL 8 5 8 5 8 6 8 6   AST U/L 93*  --  172* 212*   ALT U/L 74  --  103* 106*   ALK PHOS U/L 74  --  80 81   EGFR ml/min/1 73sq m 117 98 114 114   , Vitamin B12:   Results from last 7 days   Lab Units 06/02/22  1857   VITAMIN B 12 pg/mL 1,236*   , HgBA1C:   , TSH:   Results from last 7 days   Lab Units 06/02/22  1857   TSH 3RD GENERATON uIU/mL 4 631*   , Coagulation:   Results from last 7 days   Lab Units 06/04/22  1307   INR  1 15   , Lipid Profile:   , Ammonia:   , Urinalysis:   Results from last 7 days   Lab Units 06/02/22  1626   COLOR UA  Yellow   CLARITY UA  Clear   SPEC GRAV UA  <=1 005   PH UA  6 0   LEUKOCYTES UA  Negative   NITRITE UA  Negative   GLUCOSE UA mg/dl Negative   KETONES UA mg/dl Negative   BILIRUBIN UA  Negative   BLOOD UA  Negative   , Drug Screen:   Results from last 7 days   Lab Units 06/02/22  1626 BARBITURATE UR  Negative   BENZODIAZEPINE UR  Negative   THC UR  Positive*   COCAINE UR  Negative   METHADONE URINE  Negative   OPIATE UR  Negative   PCP UR  Negative   , Medication Drug Levels:       Invalid input(s): CARBAMAZEPINE,  PHENOBARB, LACOSAMIDE, OXCARBAZEPINE  VTE Prophylaxis: Sequential compression device (Venodyne)     Total time spent today 25 minutes

## 2022-06-04 NOTE — ASSESSMENT & PLAN NOTE
· Patient with complaints of diplopia and blurry vision in her left eye x3 days prior to admission    Concern for MS flare verses optic neuritis  · Neurology following  · 6/2 Started on pulse dose steroids with Solu-Medrol 1000 mg daily, currently day 3  · Continue sliding scale insulin for glycemic control with pulse steroids  · MRI brain/orbits for optic neuritis ordered and pending  · MS panel pending  · 6/3 LP performed by IR with low opening pressure, follow-up results  · Routine neuro exams and delirium precautions

## 2022-06-06 ENCOUNTER — TELEPHONE (OUTPATIENT)
Dept: FAMILY MEDICINE CLINIC | Facility: HOSPITAL | Age: 37
End: 2022-06-06

## 2022-06-06 ENCOUNTER — TRANSITIONAL CARE MANAGEMENT (OUTPATIENT)
Dept: FAMILY MEDICINE CLINIC | Facility: HOSPITAL | Age: 37
End: 2022-06-06

## 2022-06-06 LAB
ACE CSF-CCNC: <1.5 U/L (ref 0–2.5)
BACTERIA CSF CULT: NO GROWTH

## 2022-06-06 NOTE — UTILIZATION REVIEW
Notification of Discharge   This is a Notification of Discharge from our facility [address]  Please be advised that this patient has been discharge from our facility  Below you will find the admission and discharge date and time including the patients disposition  UTILIZATION REVIEW CONTACT:  Vani Blake  Utilization   Network Utilization Review Department  Phone: [phone] carefully listen to the prompts  All voicemails are confidential   Email: [email or web address]     PHYSICIAN ADVISORY SERVICES:  FOR KNEO-XU-WCXF REVIEW - MEDICAL NECESSITY DENIAL  Phone: [phone]  Fax: [phone]  Email: [email or web address]     PRESENTATION DATE: 6/2/2022  3:20 PM  OBERVATION ADMISSION DATE:   INPATIENT ADMISSION DATE: 6/2/22  6:43 PM   DISCHARGE DATE: 6/4/2022  6:31 PM  DISPOSITION: Left against medical advice or discontinued care Left against medical advice or discontinued care      IMPORTANT INFORMATION:  Send all requests for admission clinical reviews, approved or denied determinations and any other requests to dedicated fax number below belonging to the campus where the patient is receiving treatment   List of dedicated fax numbers:  [address] DENIALS (Administrative/Medical Necessity) [phone]   [address] (Maternity/NICU/Pediatrics) [phone]   Ayesha Lyn [phone]   130 [facility] [phone]   [address] [place] [address] [phone]   [address] [phone]   [facility]  [phone]   [address] [place] [address], [facility]  [address] [facility] 1000 [address] [facility] [phone]

## 2022-06-06 NOTE — UTILIZATION REVIEW
Continued Stay Review    Date: 6/4/22                          Current Patient Class: inpatient   Current Level of Care: med surg    HPI:37 y o  female initially admitted on 6/2/22 inpatient due to Diplopia/Right flank pain/ETOH abuse  PMH of alcoholism, hypothyroidism, depression  Assessment/Plan:   6/4/22:   Last evening CIWA to 29, has visual and auditory hallucinations  Librium increased and given IV ativan  Transfer to stepdown  On exam anxious, tremulous and hallucinating   + horizontal and vertical nystagmus  No diplopia  Overnight received 2 doses of IV phenobarb, thiamine, folic acid and IVF continue  On high dose IV Solumedrol     Vital Signs:   06/04/22 1500 98 °F (36 7 °C) 79 15 159/95 112 97 % None (Room air) Sitting   06/04/22 1200 -- 82 -- -- -- -- -- --   06/04/22 1118 97 9 °F (36 6 °C) 73 16 126/76 94 97 % None (Room air) Sitting   06/04/22 1100 -- -- -- -- -- 98 % -- --   06/04/22 0725 97 8 °F (36 6 °C) 91 20 123/86 -- -- -- Lying   06/04/22 0322 97 5 °F (36 4 °C) 72 24 Abnormal  129/85 102 96 % None (Room air) Lying   06/04/22 0147 -- 90 -- -- -- -- -- --   06/03/22 2255 98 °F (36 7 °C) 76 22 141/92 112 94 % None (Room air) Lying   06/03/22 1924 97 4 °F (36 3 °C) Abnormal  90 -- 136/86 106 98 %       CIWA:    6/4/22:   0 at 1200   0 at 1118    6 at 0725    4 at 0322   10 at 0147  6/3/22 23 at 2000    29 at 1924    7 at 1830   7 at 1430  Pertinent Labs/Diagnostic Results:   MRI brain and orbits wo and w contrast   Final Result by Elsa Flores MD (06/04 2044)         1  No evidence of optic neuritis  2   Few nonspecific subcortical white matter lesions in the frontal region could represent sequela of migraine  Other etiologies such as a demyelinating process or Lyme disease are  less likely though not entirely excluded in the appropriate clinical    context              Workstation performed: [id number]         US right upper quadrant with liver dopplers   Final Result by Allyssa Lopez MD (06/03 1705)      1  Hepatomegaly with moderately increased liver echotexture consistent with hepatic steatosis  2   Unremarkable liver Doppler  Workstation performed: [id number]         IR lumbar puncture   Final Result by Briseyda Baird MD (06/03 1647)   Impression:       Successful fluoroscopic-guided lumbar puncture  Workstation performed: [id number]           6/2/22 ecg Normal sinus rhythm  Normal ECG  Results from last 7 days   Lab Units 06/02/22  1600   SARS-COV-2  Negative     Results from last 7 days   Lab Units 06/04/22  0521 06/03/22  0356 06/02/22  1600   WBC Thousand/uL 4 55 2  35* 5 22   HEMOGLOBIN g/dL 13 6 15 0 14 9   HEMATOCRIT % 41 2 45 2 44 6   PLATELETS Thousands/uL 101* 111* 125*   NEUTROS ABS Thousands/µL 4 02  --  2 56   BANDS PCT %  --  1  --      Results from last 7 days   Lab Units 06/04/22  0521 06/03/22  1939 06/03/22  0356 06/02/22  1600   SODIUM mmol/L 134* 135* 135* 138   POTASSIUM mmol/L 4 3 4 3 4 5 4 0   CHLORIDE mmol/L 100 101 98* 101   CO2 mmol/L 18* 19* 19* 23   ANION GAP mmol/L 16* 15* 18* 14*   BUN mg/dL 7 10 8 10   CREATININE mg/dL 0 61 0 78 0 67 0 67   EGFR ml/min/1 73sq m 117 98 114 114   CALCIUM mg/dL 8 5 8 5 8 6 8 6   MAGNESIUM mg/dL 1 6  --   --   --    PHOSPHORUS mg/dL 2 7  --   --   --      Results from last 7 days   Lab Units 06/04/22  0521 06/03/22  0356 06/02/22  1600   AST U/L 93* 172* 212*   ALT U/L 74 103* 106*   ALK PHOS U/L 74 80 81   TOTAL PROTEIN g/dL 6 9 8 0 7 6   ALBUMIN g/dL 3 4* 3 9 3 9   TOTAL BILIRUBIN mg/dL 1 20* 1 00 0 50     Results from last 7 days   Lab Units 06/04/22  1528 06/04/22  1123 06/04/22  0722 06/03/22  2212   POC GLUCOSE mg/dl 177* 235* 162* 161*     Results from last 7 days   Lab Units 06/04/22  0521 06/03/22  1939 06/03/22  0356 06/02/22  1600   GLUCOSE RANDOM mg/dL 180* 183* 91 82     Results from last 7 days   Lab Units 06/04/22  1307   PROTIME seconds 14 5   INR  1 15     Results from last 7 days   Lab Units 06/02/22  1857   TSH 3RD GENERATON uIU/mL 4 631*     Results from last 7 days   Lab Units 06/04/22  0849   LACTIC ACID mmol/L 0 4*     Results from last 7 days   Lab Units 06/03/22  1246   HEP B S AG  Non-reactive   HEP C AB  Non-reactive   HEP B C IGM  Non-reactive     Results from last 7 days   Lab Units 06/02/22  1600   LIPASE u/L 200     Results from last 7 days   Lab Units 06/02/22  1600   CRP mg/L <0 5   SED RATE mm/hour 16     Results from last 7 days   Lab Units 06/02/22  1626   CLARITY UA  Clear   COLOR UA  Yellow   SPEC GRAV UA  <=1 005   PH UA  6 0   GLUCOSE UA mg/dl Negative   KETONES UA mg/dl Negative   BLOOD UA  Negative   PROTEIN UA mg/dl 30 (1+)*   NITRITE UA  Negative   BILIRUBIN UA  Negative   UROBILINOGEN UA E U /dl 0 2   LEUKOCYTES UA  Negative   WBC UA /hpf None Seen   RBC UA /hpf None Seen   BACTERIA UA /hpf None Seen   EPITHELIAL CELLS WET PREP /hpf Occasional   MUCUS THREADS  None Seen     Results from last 7 days   Lab Units 06/02/22  1600   INFLUENZA A PCR  Negative   INFLUENZA B PCR  Negative     Results from last 7 days   Lab Units 06/02/22  1626   AMPH/METH  Negative   BARBITURATE UR  Negative   BENZODIAZEPINE UR  Negative   COCAINE UR  Negative   METHADONE URINE  Negative   OPIATE UR  Negative   PCP UR  Negative   THC UR  Positive*     Results from last 7 days   Lab Units 06/02/22  1600   ETHANOL LVL mg/dL 327*     Results from last 7 days   Lab Units 06/03/22  1253 06/03/22  1246   APPEARANCE CSF   --  Colorless, Clear   TUBE NUM CSF   --  4   WBC CSF /uL  --  0   XANTHOCHROMIA   --  No   GLUCOSE CSF mg/dL  --  91*   PROTEIN CSF mg/dL  --  51*   RBC CSF uL  --  7   CSF CULTURE  No growth  --          Medications:   Scheduled Medications:  Medication    methylPREDNISolone sodium succinate (Solu-MEDROL) 1,000 mg in sodium chloride 0 9 % 250 mL IVPB IV daily     sodium chloride 0 9 % infusion iv 75 cc/h     chlordiazePOXIDE (LIBRIUM) capsule 10 mg po every 8 hours and increased to     chlordiazePOXIDE (LIBRIUM) capsule 25 mg po every 6 hours 6/3/22     venlafaxine (EFFEXOR-XR) 24 hr capsule 150 mg po daily     gabapentin (NEURONTIN) capsule 100 mg daily at bedtime     gabapentin (NEURONTIN) capsule 100 mg po TID qid before meals and at bedtime     folic acid (FOLVITE) tablet 1 mg po daily    thiamine (VITAMIN B1) 500 mg in sodium chloride 0 9 % 50 mL IVPB every 8 hours     insulin lispro (HumaLOG) 100 units/mL subcutaneous injection 2-12 Units    sucralfate (CARAFATE) tablet 1 g po qid     nicotine (NICODERM CQ) 14 mg/24hr TD 24 hr patch 1 patch TD daily     levothyroxine tablet 50 mcg po daily     famotidine (PEPCID) tablet 20 mg po BID    bisoprolol (ZEBETA) tablet 15 mg po daily    multivitamin stress formula tablet 1 tablet po daily     ondansetron (ZOFRAN) injection 4 mg     magnesium sulfate 2 g/50 mL IVPB (premix) 2 g  Dose: 2 g  Freq: Once Route: IV  Last Dose: Stopped (06/04/22 0800)  Start: 06/04/22 0645 End: 06/04/22 0800    PHENobarbital 130 mg/mL 260 mg in sodium chloride 0 9 % 100 mL IVPB  Dose: 260 mg  Freq: Once Route: IV  Last Dose: Stopped (06/03/22 2200)    PHENobarbital 130 mg/mL injection 130 mg  Dose: 130 mg  Freq: Once Route: IV  Start: 06/04/22 0200 End: 06/04/22 0205    Discharge Plan: Patient left [facility] 6/4/22     Network Utilization Review Department  ATTENTION: Please call with any questions or concerns to [phone] and carefully listen to the prompts so that you are directed to the right person  All voicemails are confidential   [employer] all requests for admission clinical reviews, approved or denied determinations and any other requests to dedicated fax number below belonging to the campus where the patient is receiving treatment   List of dedicated fax numbers for the Facilities:  FACILITY NAME UR FAX NUMBER   ADMISSION DENIALS (Administrative/Medical Necessity) [phone]   [address] (Maternity/NICU/Pediatrics) [address] [facility],[address]  [phone]   [facility] [address] [id number] [place] [address] [address] [facility] [place] [address] [phone]

## 2022-06-07 LAB
ALB CSF/SERPL: 6 {RATIO} (ref 0–8)
ALBUMIN CSF-MCNC: 28 MG/DL (ref 7–29)
ALBUMIN MFR CSF ELPH: 63.3 % (ref 56.8–76.4)
ALBUMIN SERPL-MCNC: 4.4 G/DL (ref 3.8–4.8)
ALPHA1 GLOB MFR CSF ELPH: 5.1 % (ref 1.1–6.6)
ALPHA2 GLOB MFR CSF ELPH: 5.2 % (ref 3–12.6)
B-GLOBULIN MFR CSF ELPH: 16.7 % (ref 7.3–17.9)
GAMMA GLOB MFR CSF ELPH: 7.1 % (ref 3–13)
IGG CSF-MCNC: 4 MG/DL (ref 0–6.7)
IGG SERPL-MCNC: 1134 MG/DL (ref 586–1602)
IGG SYNTH RATE SER+CSF CALC-MRATE: -0.3 MG/DAY
IGG/ALB CLEAR SER+CSF-RTO: 0.6 (ref 0–0.7)
IGG/ALB CSF: 0.14 {RATIO} (ref 0–0.25)
INTERPRETATION CSF IFE-IMP: ABNORMAL
MBP CSF-MCNC: 12.1 NG/ML (ref 0–3.7)
OLIGOCLONAL BANDS CSF ELPH-IMP: ABNORMAL %
OLIGOCLONAL BANDS.IT SER+CSF QL: ABNORMAL
PREALB MFR CSF ELPH: 2.8 % (ref 2.2–7.1)
PROT CSF-MCNC: 45.8 MG/DL (ref 0–44)

## 2022-06-08 LAB — AQP4 H2O CHANNEL IGG CSF QL: NORMAL

## 2022-06-10 LAB — METHYLMALONATE SERPL-SCNC: 59 NMOL/L (ref 0–378)

## 2022-06-11 LAB — VIT B1 BLD-SCNC: 100 NMOL/L (ref 66.5–200)

## 2022-06-13 ENCOUNTER — OFFICE VISIT (OUTPATIENT)
Dept: FAMILY MEDICINE CLINIC | Facility: HOSPITAL | Age: 37
End: 2022-06-13
Payer: COMMERCIAL

## 2022-06-13 VITALS
DIASTOLIC BLOOD PRESSURE: 76 MMHG | WEIGHT: 240.6 LBS | HEIGHT: 64 IN | TEMPERATURE: 98.5 F | BODY MASS INDEX: 41.07 KG/M2 | SYSTOLIC BLOOD PRESSURE: 114 MMHG | HEART RATE: 110 BPM

## 2022-06-13 DIAGNOSIS — F32.1 CURRENT MODERATE EPISODE OF MAJOR DEPRESSIVE DISORDER, UNSPECIFIED WHETHER RECURRENT (HCC): ICD-10-CM

## 2022-06-13 DIAGNOSIS — I10 ESSENTIAL HYPERTENSION: ICD-10-CM

## 2022-06-13 DIAGNOSIS — Z09 HOSPITAL DISCHARGE FOLLOW-UP: Primary | ICD-10-CM

## 2022-06-13 DIAGNOSIS — Z12.4 SCREENING FOR CERVICAL CANCER: ICD-10-CM

## 2022-06-13 DIAGNOSIS — H53.2 DIPLOPIA: ICD-10-CM

## 2022-06-13 DIAGNOSIS — F10.10 ETOH ABUSE: ICD-10-CM

## 2022-06-13 DIAGNOSIS — F10.231 ALCOHOL WITHDRAWAL DELIRIUM, ACUTE, HYPERACTIVE (HCC): ICD-10-CM

## 2022-06-13 DIAGNOSIS — E03.9 HYPOTHYROIDISM, UNSPECIFIED TYPE: ICD-10-CM

## 2022-06-13 DIAGNOSIS — R10.10 PAIN OF UPPER ABDOMEN: ICD-10-CM

## 2022-06-13 DIAGNOSIS — R74.01 TRANSAMINITIS: ICD-10-CM

## 2022-06-13 DIAGNOSIS — R10.9 RIGHT FLANK PAIN: ICD-10-CM

## 2022-06-13 PROCEDURE — 99495 TRANSJ CARE MGMT MOD F2F 14D: CPT | Performed by: INTERNAL MEDICINE

## 2022-06-13 PROCEDURE — 1111F DSCHRG MED/CURRENT MED MERGE: CPT | Performed by: INTERNAL MEDICINE

## 2022-06-13 RX ORDER — FAMOTIDINE 20 MG/1
20 TABLET, FILM COATED ORAL 2 TIMES DAILY PRN
Qty: 60 TABLET | Refills: 2
Start: 2022-06-13 | End: 2022-08-01

## 2022-06-13 RX ORDER — GABAPENTIN 100 MG/1
CAPSULE ORAL
Qty: 60 CAPSULE | Refills: 1
Start: 2022-06-13

## 2022-06-13 NOTE — ASSESSMENT & PLAN NOTE
States mood is doing well currently, on Effexor, following with therapist but still cannot get in with psych, con't current meds for now, call with new/worse mood

## 2022-06-13 NOTE — ASSESSMENT & PLAN NOTE
Chronic and unchanged, had CT Abd previously and RUQ US this visit - fatty liver noted, no further eval at this time, encouraged to quit drinking

## 2022-06-13 NOTE — ASSESSMENT & PLAN NOTE
Left AMA, denies seizures or current withdrawal symptoms, has stopped drinking liquor and down to just beer, follows with therapist at [facility], call with new/worse symptoms

## 2022-06-13 NOTE — ASSESSMENT & PLAN NOTE
Again encouraged to quit drinking, fatty liver also noted, diet/exercise/wgt loss encouraged, will follow

## 2022-06-13 NOTE — ASSESSMENT & PLAN NOTE
Trying to cut back - stopped liquor and just drinking beer which is good for her, following with a therapist at [facility], call with any issues or if any further assistance is needed

## 2022-06-13 NOTE — PROGRESS NOTES
Assessment/Plan:    Alcohol withdrawal delirium, acute, hyperactive ([address] )  Left AMA, denies seizures or current withdrawal symptoms, has stopped drinking liquor and down to just beer, follows with therapist at [facility], call with new/worse symptoms    Diplopia  Extensive neuro w/u in hospital done - BW/MRI brain and orbits/LP - nonspecific white matter changes in front lobe on MRI, one myelin protein mildly elevated on MS panel, pt saw eye doc for f/u and was found to have been given wrong contact rx - new contact received and vision issues completely resolved, call with relapse in symptoms or with any other neuro symptoms    ETOH abuse  Trying to cut back - stopped liquor and just drinking beer which is good for her, following with a therapist at [facility], call with any issues or if any further assistance is needed    Right flank pain  Chronic and unchanged, had CT Abd previously and RUQ US this visit - fatty liver noted, no further eval at this time, encouraged to quit drinking    Transaminitis  Again encouraged to quit drinking, fatty liver also noted, diet/exercise/wgt loss encouraged, will follow    Essential hypertension  BP at goal back on Bisoprolol, con't current regimen    Hypothyroidism  TSH elevated while IP, restarted on levothyroxine, check TFT's in 6 wks - order given, will follow    Current moderate episode of major depressive disorder ([facility] [address] )  States mood is doing well currently, on Effexor, following with therapist but still cannot get in with psych, con't current meds for now, call with new/worse mood       Diagnoses and all orders for this visit:    Hospital discharge follow-up    Diplopia    ETOH abuse    Alcohol withdrawal delirium, acute, hyperactive ([address] )    Right flank pain  -     gabapentin (Neurontin) 100 mg capsule; 1 tab PO qhs x 2 wks then increase to 2 tab PO qhs and stay on that    Transaminitis    Essential hypertension    Hypothyroidism, unspecified type  -     TSH, 3rd generation with Free T4 reflex; Future  -     TSH, 3rd generation with Free T4 reflex    Current moderate episode of major depressive disorder, unspecified whether recurrent (HCC)    Pain of upper abdomen  Comments:  Tender on exam, check, RUQ US with ETOH history and try a trial of Pepcid bid, may be related to constipation - start MiraLax or Sennekot, encouraged high fiber  Orders:  -     famotidine (PEPCID) 20 mg tablet; Take 1 tablet (20 mg total) by mouth 2 (two) times a day as needed for heartburn    Screening for cervical cancer  -     Ambulatory Referral to Gynecology; Future      Has not seen GYN in over 2 yrs - had GYN in [place] but they moved and practice dissolved, number Dr Tommie Burt office given today and urged to call for appt        Subjective:      Patient ID: Krissy Jacobs is a 30 y o  female  HPI Pt here for TCM/Hospital follow up  Pt was admitted to [facility] on 6/2/22  She left AMA 6/4/22  Records were reviewed by myself in detail and events are summarized below  TCM Call (since 5/13/2022)     Date and time call was made  6/6/2022 10:44 AM    Hospital care reviewed  Records reviewed    Patient was hospitialized at  [facility]    Date of Admission  06/02/22    Date of discharge  06/04/22    Diagnosis  Diplopia    Disposition  Home    Were the patients medications reviewed and updated  No    Current Symptoms  None      TCM Call (since 5/13/2022)     Post hospital issues  None    Should patient be enrolled in anticoag monitoring? No    Scheduled for follow up? Yes    Did you obtain your prescribed medications  Yes    Do you need help managing your prescriptions or medications  No    Is transportation to your appointment needed  No    I have advised the patient to call PCP with any new or worsening symptoms  David Mendosa MA        Pt presented to [facility] on 6/2/22 with c/o con't chronic abd/R flank pain and N/V and increase ETOH use to control her pain   She was noted to have "trouble seeing" double vision (horizontal diplopia) for 3 days as well  In the ED /115 and   Exam notable for tachycardia and L retina with not as sharp margins at L optic disc, L chest wall tenderness was noted with palpation as well  W/u in ED notable for ETOH of 327, UDS + THC, UA +1 protein, CBC with plt 125 and RDW elevated at 16 2  AST/ALT elevated at 212/106  EXR/CRP/lipase nml  ECG w/o any acute ischemia  Pt was admitted with diplopia and ETOH use with high risk for withdrawal  Neuro was consulted  Vitamin levels were drawn  She was started on IV Solumedrol  LP was done and no elevation of opening pressures were noted  Mild increase in protein and glucose was noted in CSF otherwise nml CSF noted  MRI of brain and orbits was done and results showed:1  No evidence of optic neuritis  2  Few nonspecific subcortical white matter lesions in the frontal region could represent sequela of migraine  Other etiologies such as a demyelinating process or Lyme disease are  less likely though not entirely excluded  She was placed on ETOH withdrawal protocol  The night of 6/3/22 she had acute hallucinations and was transferred to step down unit  She was given Ativan and Librium as well as phenobarbital and thiamine  Her R flank pain was evaluated with RUQ US and hepatomegaly and visualization c/w hepatic steatosis was noted  Her TSH was very elevated and her levothyroxine was restarted  Her BP was very elevated and her BP med was restarted  Pt signed out [facility] 6/4/22  Med list was reviewed  Pt was very unhappy with hospital stay  She feels she was treated a certain way d/t her ETOH  She is still drinking but has stopped drinking liquor and is down to just beer - had 4 beers yesterday  She has not been able to f/u with psych d/t difficulty getting appt  Pt is seeing [facility]  therapist once a week - they are trying to help her with her mental health and ETOH use    Pt is taking her Effexor 150 mg vivian as directed  Pt saw her eye doc as follow up and was noted to have been sent the wrong rx for her contacts  She got a new pair of contacts and she notes no further issues with vision/focus since she got her new contacts  Her NMO CSF came back negative  Her MS panel showed only an elevated myelin basic protein  She is off Gabapentin d/t her chronic flank pain as she felt it made her too sleepy  She is still having the R flank pain  She is interested in trying Gabapentin again but just at bedtime  Review of Systems   Constitutional: Negative for chills, fever and unexpected weight change  HENT: Negative for congestion and trouble swallowing  Eyes: Negative for pain and visual disturbance  Respiratory: Negative for cough and shortness of breath  Cardiovascular: Negative for chest pain, palpitations and leg swelling  Gastrointestinal: Positive for nausea and vomiting  Negative for abdominal pain, blood in stool, constipation and diarrhea  Endocrine: Negative for polydipsia and polyuria  Genitourinary: Negative for difficulty urinating and dysuria  Musculoskeletal: Negative for back pain and neck pain  Skin: Negative for rash and wound  Neurological: Negative for dizziness, light-headedness and headaches  Hematological: Does not bruise/bleed easily  Psychiatric/Behavioral: Negative for dysphoric mood  Objective:    /76   Pulse (!) 110   Temp 98 5 °F (36 9 °C) (Tympanic)   Ht 5' 4" (1 626 m)   Wt 109 kg (240 lb 9 6 oz)   BMI 41 30 kg/m²      Physical Exam  Vitals and nursing note reviewed  Constitutional:       General: She is not in acute distress  Appearance: She is well-developed  She is obese  She is not ill-appearing  HENT:      Head: Normocephalic and atraumatic  Eyes:      General:         Right eye: No discharge  Left eye: No discharge        Conjunctiva/sclera: Conjunctivae normal    Neck:      Trachea: No tracheal deviation  Cardiovascular:      Rate and Rhythm: Normal rate and regular rhythm  Heart sounds: Normal heart sounds  No murmur heard  No friction rub  Pulmonary:      Effort: Pulmonary effort is normal  No respiratory distress  Breath sounds: Normal breath sounds  No wheezing, rhonchi or rales  Abdominal:      General: There is no distension  Palpations: Abdomen is soft  Tenderness: There is no abdominal tenderness  There is no guarding or rebound  Musculoskeletal:         General: No deformity or signs of injury  Cervical back: Neck supple  Skin:     General: Skin is warm  Coloration: Skin is not pale  Findings: No rash  Neurological:      General: No focal deficit present  Mental Status: She is alert  Motor: No abnormal muscle tone  Gait: Gait normal    Psychiatric:         Mood and Affect: Mood normal          Behavior: Behavior normal          Thought Content:  Thought content normal          Judgment: Judgment normal

## 2022-06-13 NOTE — ASSESSMENT & PLAN NOTE
Extensive neuro w/u in hospital done - BW/MRI brain and orbits/LP - nonspecific white matter changes in front lobe on MRI, one myelin protein mildly elevated on MS panel, pt saw eye doc for f/u and was found to have been given wrong contact rx - new contact received and vision issues completely resolved, call with relapse in symptoms or with any other neuro symptoms

## 2022-07-18 ENCOUNTER — TELEPHONE (OUTPATIENT)
Dept: FAMILY MEDICINE CLINIC | Facility: HOSPITAL | Age: 37
End: 2022-07-18

## 2022-07-18 NOTE — TELEPHONE ENCOUNTER
I would need specifics - where she sought treatment/the dates and what specifically she needs written

## 2022-07-18 NOTE — TELEPHONE ENCOUNTER
Pt states she was at [facility] in [place] from 6/20/22-7/17/22  She states that the letter must say she needed to have the leave for treatment from this facility

## 2022-07-18 NOTE — TELEPHONE ENCOUNTER
Pt was recently seen for alcohol abuse and in treatment facility  Pt did not have enough FMLA time and employer is requesting dr note stating that PCP recommended treatment   PCB

## 2022-08-01 ENCOUNTER — OFFICE VISIT (OUTPATIENT)
Dept: FAMILY MEDICINE CLINIC | Facility: HOSPITAL | Age: 37
End: 2022-08-01
Payer: COMMERCIAL

## 2022-08-01 VITALS
SYSTOLIC BLOOD PRESSURE: 126 MMHG | TEMPERATURE: 97.6 F | WEIGHT: 252.6 LBS | HEIGHT: 64 IN | HEART RATE: 80 BPM | BODY MASS INDEX: 43.13 KG/M2 | DIASTOLIC BLOOD PRESSURE: 78 MMHG

## 2022-08-01 DIAGNOSIS — E66.01 MORBID OBESITY WITH BMI OF 40.0-44.9, ADULT (HCC): ICD-10-CM

## 2022-08-01 DIAGNOSIS — F41.1 GENERALIZED ANXIETY DISORDER: ICD-10-CM

## 2022-08-01 DIAGNOSIS — F10.931 ALCOHOL WITHDRAWAL DELIRIUM, ACUTE, HYPERACTIVE (HCC): ICD-10-CM

## 2022-08-01 DIAGNOSIS — Z23 ENCOUNTER FOR IMMUNIZATION: ICD-10-CM

## 2022-08-01 DIAGNOSIS — Z00.00 ANNUAL PHYSICAL EXAM: Primary | ICD-10-CM

## 2022-08-01 DIAGNOSIS — R74.01 TRANSAMINITIS: ICD-10-CM

## 2022-08-01 DIAGNOSIS — I10 ESSENTIAL HYPERTENSION: ICD-10-CM

## 2022-08-01 DIAGNOSIS — Z12.4 CERVICAL CANCER SCREENING: ICD-10-CM

## 2022-08-01 PROCEDURE — 99395 PREV VISIT EST AGE 18-39: CPT | Performed by: INTERNAL MEDICINE

## 2022-08-01 PROCEDURE — 90471 IMMUNIZATION ADMIN: CPT | Performed by: INTERNAL MEDICINE

## 2022-08-01 PROCEDURE — 90677 PCV20 VACCINE IM: CPT | Performed by: INTERNAL MEDICINE

## 2022-08-01 RX ORDER — TRAZODONE HYDROCHLORIDE 50 MG/1
TABLET ORAL
COMMUNITY
Start: 2022-07-15 | End: 2022-08-12 | Stop reason: SDUPTHER

## 2022-08-01 RX ORDER — BISOPROLOL FUMARATE 5 MG/1
TABLET, FILM COATED ORAL
Qty: 30 TABLET | Refills: 5 | Status: SHIPPED | OUTPATIENT
Start: 2022-08-01

## 2022-08-01 RX ORDER — VENLAFAXINE HYDROCHLORIDE 75 MG/1
CAPSULE, EXTENDED RELEASE ORAL
Qty: 30 CAPSULE | Refills: 5 | Status: SHIPPED | OUTPATIENT
Start: 2022-08-01

## 2022-08-01 RX ORDER — BISOPROLOL FUMARATE 10 MG/1
TABLET, FILM COATED ORAL
Qty: 30 TABLET | Refills: 5 | Status: SHIPPED | OUTPATIENT
Start: 2022-08-01

## 2022-08-01 RX ORDER — NALTREXONE HYDROCHLORIDE 50 MG/1
50 TABLET, FILM COATED ORAL DAILY
COMMUNITY
Start: 2022-07-15

## 2022-08-01 RX ORDER — DISULFIRAM 250 MG/1
TABLET ORAL
COMMUNITY
Start: 2022-07-15

## 2022-08-01 RX ORDER — VENLAFAXINE HYDROCHLORIDE 75 MG/1
CAPSULE, EXTENDED RELEASE ORAL
COMMUNITY
Start: 2022-07-15

## 2022-08-01 NOTE — PROGRESS NOTES
ADULT ANNUAL [address] [facility] [address]     NAME: Dominga Humphreys  AGE: 30 y o  SEX: female  : 1985     DATE: 2022     Assessment and Plan:     Problem List Items Addressed This Visit        Cardiovascular and Mediastinum    Essential hypertension     Bp at goal, con't current regimen - med list updated today         Relevant Medications    bisoprolol (ZEBETA) 10 MG tablet    bisoprolol (ZEBETA) 5 mg tablet       Nervous and Auditory    Alcohol withdrawal delirium, acute, hyperactive ([facility] [address] )     Just finished IP tx, in IOP tx currently, feeling great, congratulated and urged to con't with avoidance of ETOH         Relevant Medications    disulfiram (ANTABUSE) 250 mg tablet    traZODone (DESYREL) 50 mg tablet    venlafaxine (EFFEXOR-XR) 75 mg 24 hr capsule    venlafaxine (EFFEXOR-XR) 75 mg 24 hr capsule    Other Relevant Orders    CBC and differential       Other    Morbid obesity with BMI of 40 0-44 9, adult ([facility] [address] )     Diet/exercise/wgt loss encouraged         Generalized anxiety disorder     Mood greatly improved with cessation of ETOH and increase in Effexor, con't current regimen         Relevant Medications    disulfiram (ANTABUSE) 250 mg tablet    traZODone (DESYREL) 50 mg tablet    venlafaxine (EFFEXOR-XR) 75 mg 24 hr capsule    venlafaxine (EFFEXOR-XR) 75 mg 24 hr capsule    Transaminitis     Improved with cessation of drinking - will recheck -order given, will follow         Relevant Orders    CBC and differential    Comprehensive metabolic panel      Other Visit Diagnoses     Annual physical exam    -  Primary    Cervical cancer screening        Relevant Orders    Ambulatory Referral to Gynecology          Immunizations and preventive care screenings were discussed with patient today  Appropriate education was printed on patient's after visit summary      Counseling:  Alcohol/drug use: discussed moderation in alcohol intake, the recommendations for healthy alcohol use, and avoidance of illicit drug use  Dental Health: discussed importance of regular tooth brushing, flossing, and dental visits  Injury prevention: discussed safety/seat belts, safety helmets, smoke detectors, carbon dioxide detectors, and smoking near bedding or upholstery  · Exercise: the importance of regular exercise/physical activity was discussed  Recommend exercise 3-5 times per week for at least 30 minutes  · Cervical cancer screening: overdue for PAP - number for GYN given      Tobacco Cessation Counseling: Tobacco cessation counseling was provided  The patient is sincerely urged to quit consumption of tobacco  She is not ready to quit tobacco  Patient refused medication  Return in about 3 months (around 11/1/2022) for Recheck  Chief Complaint:     Chief Complaint   Patient presents with    Follow-up      History of Present Illness:     Adult Annual Physical   Patient here for a comprehensive physical exam  The patient reports problems - Was IP for ETOH rehab from June 21st for 25 days  She has been doing well since then  Both her and her  are doing IOP 4 days a week  She states she is feeling better mentally  She had BW done and meds started  Diet and Physical Activity  · Diet/Nutrition: limited junk food and limited fruits/vegetables  · Exercise: pel[place] 3-4 days a week for 60 min  · BMI reviewed     Depression Screening  PHQ-2/9 Depression Screening         General Health  · Sleep: sleeps well and uses Trazodone as needed w/great benefit  · Hearing: decreased - bilateral   · Vision: no vision problems, most recent eye exam <1 year ago and wears glasses and contacts  · Dental: no dental visits for >1 year, brushes teeth twice daily and flosses teeth occasionally  /GYN Health  · Last menstrual period: spotting currently  · Contraceptive method: not currently sexually active    · History of STDs?: no      Review of Systems:     Review of Systems   Constitutional: Negative for chills, fever and unexpected weight change  HENT: Positive for postnasal drip  Negative for congestion and sore throat  Eyes: Negative for pain and visual disturbance  Respiratory: Positive for cough  Negative for shortness of breath and wheezing  Cardiovascular: Negative for chest pain, palpitations and leg swelling  Gastrointestinal: Negative for abdominal pain, blood in stool, constipation, diarrhea, nausea and vomiting  Endocrine: Negative for polydipsia and polyuria  Genitourinary: Negative for difficulty urinating and dysuria  Musculoskeletal: Positive for arthralgias  Negative for back pain and neck pain  Skin: Negative for rash and wound  Neurological: Positive for headaches  Negative for dizziness and light-headedness  Hematological: Does not bruise/bleed easily  Psychiatric/Behavioral: Negative for dysphoric mood  The patient is not nervous/anxious  Past Medical History:     Past Medical History:   Diagnosis Date    ETOH abuse     Insulin controlled gestational diabetes mellitus (GDM) during pregnancy, antepartum 2019    Metformin dinner/HS insulin  Last Assessment & Plan:  BG log reviewed today:- previous review on Friday with recommendation for increasing HS insulin  Fastings: high normal 1 hour PP: within the desired range   Discussed rationale and recommendation to change current medical therapy as listed:   Bedtime: 18 units Levemir  Continue same dose of Metformin, 1000 mg at dinner    Denies signs and sympto      Past Surgical History:     Past Surgical History:   Procedure Laterality Date     SECTION  2018     SECTION  10/20/2017    DILATION AND CURETTAGE OF UTERUS      IR LUMBAR PUNCTURE  6/3/2022    MASTOIDECTOMY  2013    MASTOIDECTOMY Right     WISDOM TOOTH EXTRACTION        Social History:     Social History     Socioeconomic History    Marital status: /Civil Union     Spouse name: None    Number of children: None    Years of education: None    Highest education level: None   Occupational History    None   Tobacco Use    Smoking status: Current Every Day Smoker     Packs/day: 0 50     Types: Cigarettes     Last attempt to quit: 2017     Years since quittin 5    Smokeless tobacco: Never Used   Vaping Use    Vaping Use: Never used   Substance and Sexual Activity    Alcohol use: Not Currently     Comment: Quit 2022    Drug use: Not Currently     Types: Marijuana    Sexual activity: Yes     Partners: Male     Birth control/protection: Coitus interruptus   Other Topics Concern    None   Social History Narrative    None     Social Determinants of Health     Financial Resource Strain: Not on file   Food Insecurity: No Food Insecurity    Worried About Running Out of Food in the Last Year: Never true    Luis of Food in the Last Year: Never true   Transportation Needs: No Transportation Needs    Lack of Transportation (Medical): No    Lack of Transportation (Non-Medical):  No   Physical Activity: Not on file   Stress: Not on file   Social Connections: Not on file   Intimate Partner Violence: Not on file   Housing Stability: Low Risk     Unable to Pay for Housing in the Last Year: No    Number of Places Lived in the Last Year: 1    Unstable Housing in the Last Year: No      Family History:     Family History   Problem Relation Age of Onset    Multiple sclerosis Mother     Thyroid disease Mother     Diabetes Mother     Hypertension Mother     COPD Mother     Rectal cancer Father 47    Hyperlipidemia Father     Hypertension Father     Alcohol abuse Father     Depression Sister     Hypertension Sister       Current Medications:     Current Outpatient Medications   Medication Sig Dispense Refill    bisoprolol (ZEBETA) 10 MG tablet 5 mg + 10 mg = 15 mg daily 30 tablet 5    bisoprolol (ZEBETA) 5 mg tablet 5 mg + 10 mg = 15 mg daily 30 tablet 5    venlafaxine (EFFEXOR-XR) 75 mg 24 hr capsule 75 mg + 150 mg = 225 mg daily 30 capsule 5    disulfiram (ANTABUSE) 250 mg tablet TAKE 1 TABLET BY MOUTH EVERYDAY AS NEEDED IN HIGH RISK SETTINGS      fluticasone (FLONASE) 50 mcg/act nasal spray 1 spray into each nostril daily      gabapentin (Neurontin) 100 mg capsule 1 tab PO qhs x 2 wks then increase to 2 tab PO qhs and stay on that 60 capsule 1    hydrOXYzine pamoate (VISTARIL) 50 mg capsule TAKE 1 CAPSULE BY MOUTH EVERY DAY AS NEEDED NO MORE THAN 6 CAPSULES IN 24 HOURS      levothyroxine 50 mcg tablet Take 1 tablet (50 mcg total) by mouth daily 30 tablet 5    Multiple Vitamins-Minerals (MULTIVITAMIN ADULT PO) Take 1 tablet by mouth daily (Patient not taking: Reported on 2/18/2022 )      naltrexone (REVIA) 50 mg tablet Take 50 mg by mouth daily      traZODone (DESYREL) 50 mg tablet TAKE 1-2 TABLETS AT BEDTIME AS NEEDED FOR SLEEP      venlafaxine (EFFEXOR-XR) 150 mg 24 hr capsule Take 1 capsule (150 mg total) by mouth daily 30 capsule 5    venlafaxine (EFFEXOR-XR) 75 mg 24 hr capsule TAKE 1 CAPSULE BY MOUTH EVERYDAY AT 8AM       No current facility-administered medications for this visit  Allergies: Allergies   Allergen Reactions    Contrast [Iodinated Diagnostic Agents] Anaphylaxis      Physical Exam:     /78   Pulse 80   Temp 97 6 °F (36 4 °C) (Tympanic)   Ht 5' 4" (1 626 m)   Wt 115 kg (252 lb 9 6 oz)   BMI 43 36 kg/m²     Physical Exam  Vitals and nursing note reviewed  Constitutional:       General: She is not in acute distress  Appearance: She is well-developed  She is obese  She is not ill-appearing  HENT:      Head: Normocephalic and atraumatic  Right Ear: Tympanic membrane and external ear normal       Left Ear: Tympanic membrane and external ear normal    Eyes:      General:         Right eye: No discharge  Left eye: No discharge        Conjunctiva/sclera: Conjunctivae normal    Neck:      Thyroid: No thyromegaly  Trachea: No tracheal deviation  Cardiovascular:      Rate and Rhythm: Normal rate and regular rhythm  Heart sounds: Normal heart sounds  No murmur heard  Pulmonary:      Effort: Pulmonary effort is normal  No respiratory distress  Breath sounds: Normal breath sounds  No wheezing, rhonchi or rales  Abdominal:      General: There is no distension  Palpations: Abdomen is soft  Tenderness: There is no abdominal tenderness  There is no guarding or rebound  Musculoskeletal:         General: No deformity or signs of injury  Cervical back: Neck supple  Lymphadenopathy:      Cervical: No cervical adenopathy  Skin:     General: Skin is warm and dry  Coloration: Skin is not pale  Findings: No rash  Neurological:      General: No focal deficit present  Mental Status: She is alert  Motor: No abnormal muscle tone  Gait: Gait normal    Psychiatric:         Mood and Affect: Mood normal          Behavior: Behavior normal          Thought Content:  Thought content normal          Judgment: Judgment normal           Albertus Bernheim, DO   [address]

## 2022-08-01 NOTE — ASSESSMENT & PLAN NOTE
Just finished IP tx, in IOP tx currently, feeling great, congratulated and urged to con't with avoidance of ETOH

## 2022-08-01 NOTE — PATIENT INSTRUCTIONS
Wellness Visit for Adults   AMBULATORY CARE:   A wellness visit  is when you see your healthcare provider to get screened for health problems  Your healthcare provider will also give you advice on how to stay healthy  Write down your questions so you remember to ask them  Ask your healthcare provider how often you should have a wellness visit  What happens at a wellness visit:  Your healthcare provider will ask about your health, and your family history of health problems  This includes high blood pressure, heart disease, and cancer  He or she will ask if you have symptoms that concern you, if you smoke, and about your mood  You may also be asked about your intake of medicines, supplements, food, and alcohol  Any of the following may be done:  · Your weight  will be checked  Your height may also be checked so your body mass index (BMI) can be calculated  Your BMI shows if you are at a healthy weight  · Your blood pressure  and heart rate will be checked  Your temperature may also be checked  · Blood and urine tests  may be done  Blood tests may be done to check your cholesterol levels  Abnormal cholesterol levels increase your risk for heart disease and stroke  You may also need a blood or urine test to check for diabetes if you are at increased risk  Urine tests may be done to look for signs of an infection or kidney disease  · A physical exam  includes checking your heartbeat and lungs with a stethoscope  Your healthcare provider may also check your skin to look for sun damage  · Screening tests  may be recommended  A screening test is done to check for diseases that may not cause symptoms  The screening tests you may need depend on your age, gender, family history, and lifestyle habits  For example, colorectal screening may be recommended if you are 48years old or older  Screening tests you need if you are a woman:   · A Pap smear  is used to screen for cervical cancer   Pap smears are usually done every 3 to 5 years depending on your age  You may need them more often if you have had abnormal Pap smear test results in the past  Ask your healthcare provider how often you should have a Pap smear  · A mammogram  is an x-ray of your breasts to screen for breast cancer  Experts recommend mammograms every 2 years starting at age 48 years  You may need a mammogram at age 52 years or younger if you have an increased risk for breast cancer  Talk to your healthcare provider about when you should start having mammograms and how often you need them  Vaccines you may need:   · Get an influenza vaccine  every year  The influenza vaccine protects you from the flu  Several types of viruses cause the flu  The viruses change over time, so new vaccines are made each year  · Get a tetanus-diphtheria (Td) booster vaccine  every 10 years  This vaccine protects you against tetanus and diphtheria  Tetanus is a severe infection that may cause painful muscle spasms and lockjaw  Diphtheria is a severe bacterial infection that causes a thick covering in the back of your mouth and throat  · Get a human papillomavirus (HPV) vaccine  if you are female and aged 23 to 32 or male 23 to 24 and never received it  This vaccine protects you from HPV infection  HPV is the most common infection spread by sexual contact  HPV may also cause vaginal, penile, and anal cancers  · Get a pneumococcal vaccine  if you are aged 72 years or older  The pneumococcal vaccine is an injection given to protect you from pneumococcal disease  Pneumococcal disease is an infection caused by pneumococcal bacteria  The infection may cause pneumonia, meningitis, or an ear infection  · Get a shingles vaccine  if you are 60 or older, even if you have had shingles before  The shingles vaccine is an injection to protect you from the varicella-zoster virus  This is the same virus that causes chickenpox   Shingles is a painful rash that develops in people who had chickenpox or have been exposed to the virus  How to eat healthy:  My Plate is a model for planning healthy meals  It shows the types and amounts of foods that should go on your plate  Fruits and vegetables make up about half of your plate, and grains and protein make up the other half  A serving of dairy is included on the side of your plate  The amount of calories and serving sizes you need depends on your age, gender, weight, and height  Examples of healthy foods are listed below:  · Eat a variety of vegetables  such as dark green, red, and orange vegetables  You can also include canned vegetables low in sodium (salt) and frozen vegetables without added butter or sauces  · Eat a variety of fresh fruits , canned fruit in 100% juice, frozen fruit, and dried fruit  · Include whole grains  At least half of the grains you eat should be whole grains  Examples include whole-wheat bread, wheat pasta, brown rice, and whole-grain cereals such as oatmeal     · Eat a variety of protein foods such as seafood (fish and shellfish), lean meat, and poultry without skin (turkey and chicken)  Examples of lean meats include pork leg, shoulder, or tenderloin, and beef round, sirloin, tenderloin, and extra lean ground beef  Other protein foods include eggs and egg substitutes, beans, peas, soy products, nuts, and seeds  · Choose low-fat dairy products such as skim or 1% milk or low-fat yogurt, cheese, and cottage cheese  · Limit unhealthy fats  such as butter, hard margarine, and shortening  Exercise:  Exercise at least 30 minutes per day on most days of the week  Some examples of exercise include walking, biking, dancing, and swimming  You can also fit in more physical activity by taking the stairs instead of the elevator or parking farther away from stores  Include muscle strengthening activities 2 days each week  Regular exercise provides many health benefits   It helps you manage your weight, and decreases your risk for type 2 diabetes, heart disease, stroke, and high blood pressure  Exercise can also help improve your mood  Ask your healthcare provider about the best exercise plan for you  General health and safety guidelines:   · Do not smoke  Nicotine and other chemicals in cigarettes and cigars can cause lung damage  Ask your healthcare provider for information if you currently smoke and need help to quit  E-cigarettes or smokeless tobacco still contain nicotine  Talk to your healthcare provider before you use these products  · Limit alcohol  A drink of alcohol is 12 ounces of beer, 5 ounces of wine, or 1½ ounces of liquor  · Lose weight, if needed  Being overweight increases your risk of certain health conditions  These include heart disease, high blood pressure, type 2 diabetes, and certain types of cancer  · Protect your skin  Do not sunbathe or use tanning beds  Use sunscreen with a SPF 15 or higher  Apply sunscreen at least 15 minutes before you go outside  Reapply sunscreen every 2 hours  Wear protective clothing, hats, and sunglasses when you are outside  · Drive safely  Always wear your seatbelt  Make sure everyone in your car wears a seatbelt  A seatbelt can save your life if you are in an accident  Do not use your cell phone when you are driving  This could distract you and cause an accident  Pull over if you need to make a call or send a text message  · Practice safe sex  Use latex condoms if are sexually active and have more than one partner  Your healthcare provider may recommend screening tests for sexually transmitted infections (STIs)  · Wear helmets, lifejackets, and protective gear  Always wear a helmet when you ride a bike or motorcycle, go skiing, or play sports that could cause a head injury  Wear protective equipment when you play sports  Wear a lifejacket when you are on a boat or doing water sports      © Copyright [employer] 2022 Information is for End User's use only and may not be sold, redistributed or otherwise used for commercial purposes  All illustrations and images included in CareNotes® are the copyrighted property of A D A M , Inc  or Yvonne Thomas  The above information is an  only  It is not intended as medical advice for individual conditions or treatments  Talk to your doctor, nurse or pharmacist before following any medical regimen to see if it is safe and effective for you  Obesity   AMBULATORY CARE:   Obesity  means your body mass index (BMI) is greater than 30  Your healthcare provider will use your height and weight to measure your BMI  The risks of obesity include  many health problems, including injuries or physical disability  · Diabetes (high blood sugar level)    · High blood pressure or high cholesterol    · Heart disease    · Stroke    · Gallbladder or liver disease    · Cancer of the colon, breast, prostate, liver, or kidney    · Sleep apnea    · Arthritis or gout    Screening  is done to check for health conditions before you have signs or symptoms  If you are 28to 79years old, your blood sugar level may be checked every 3 years for signs of prediabetes or diabetes  Your healthcare provider will check your blood pressure at each visit  High blood pressure can lead to a stroke or other problems  Your provider may check for signs of heart disease, cancer, or other health problems  Seek care immediately if:   · You have a severe headache, confusion, or difficulty speaking  · You have weakness on one side of your body  · You have chest pain, sweating, or shortness of breath  Call your doctor if:   · You have symptoms of gallbladder or liver disease, such as pain in your upper abdomen  · You have knee or hip pain and discomfort while walking  · You have symptoms of diabetes, such as intense hunger and thirst, and frequent urination      · You have symptoms of sleep apnea, such as snoring or daytime sleepiness  · You have questions or concerns about your condition or care  Treatment for obesity  focuses on helping you lose weight to improve your health  Even a small decrease in BMI can reduce the risk for many health problems  Your healthcare provider will help you set a weight-loss goal   · Lifestyle changes  are the first step in treating obesity  These include making healthy food choices and getting regular physical activity  Your healthcare provider may suggest a weight-loss program that involves coaching, education, and therapy  · Medicine  may help you lose weight when it is used with a healthy foods and physical activity  · Surgery  can help you lose weight if you are very obese and have other health problems  There are several types of weight-loss surgery  Ask your healthcare provider for more information  Tips for safe weight loss:   · Set small, realistic goals  An example of a small goal is to walk for 20 minutes 5 days a week  Anther goal is to lose 5% of your body weight  · Tell friends, family members, and coworkers about your goals  and ask for their support  Ask a friend to lose weight with you, or join a weight-loss support group  · Identify foods or triggers that may cause you to overeat , and find ways to avoid them  Remove tempting high-calorie foods from your home and workplace  Place a bowl of fresh fruit on your kitchen counter  If stress causes you to eat, then find other ways to cope with stress  A counselor or therapist may be able to help you  · Keep a diary to track what you eat and drink  Also write down how many minutes of physical activity you do each day  Weigh yourself once a week and record it in your diary  Eating changes: You will need to eat 500 to 1,000 fewer calories each day than you currently eat to lose 1 to 2 pounds a week  The following changes will help you cut calories:  · Eat smaller portions    Use small plates, no larger than 9 inches in diameter  Fill your plate half full of fruits and vegetables  Measure your food using measuring cups until you know what a serving size looks like  · Eat 3 meals and 1 or 2 snacks each day  Plan your meals in advance  [place] and eat at home most of the time  Eat slowly  Do not skip meals  Skipping meals can lead to overeating later in the day  This can make it harder for you to lose weight  Talk with a dietitian to help you make a meal plan and schedule that is right for you  · Eat fruits and vegetables at every meal   They are low in calories and high in fiber, which makes you feel full  Do not add butter, margarine, or cream sauce to vegetables  Use herbs to season steamed vegetables  · Eat less fat and fewer fried foods  Eat more baked or grilled chicken and fish  These protein sources are lower in calories and fat than red meat  Limit fast food  Dress your salads with olive oil and vinegar instead of bottled dressing  · Limit the amount of sugar you eat  Do not drink sugary beverages  Limit alcohol  Activity changes:  Physical activity is good for your body in many ways  It helps you burn calories and build strong muscles  It decreases stress and depression, and improves your mood  It can also help you sleep better  Talk to your healthcare provider before you begin an exercise program   · Exercise for at least 30 minutes 5 days a week  Start slowly  Set aside time each day for physical activity that you enjoy and that is convenient for you  It is best to do both weight training and an activity that increases your heart rate, such as walking, bicycling, or swimming  · Find ways to be more active  Do yard work and housecleaning  Walk up the stairs instead of using elevators  Spend your leisure time going to events that require walking, such as outdoor festivals or fairs  This extra physical activity can help you lose weight and keep it off         Follow up with your doctor as directed: You may need to meet with a dietitian  Write down your questions so you remember to ask them during your visits  © Copyright [address] 2022 Information is for End User's use only and may not be sold, redistributed or otherwise used for commercial purposes  All illustrations and images included in CareNotes® are the copyrighted property of A D A M , Inc  or [facility] Donald Castillo   The above information is an  only  It is not intended as medical advice for individual conditions or treatments  Talk to your doctor, nurse or pharmacist before following any medical regimen to see if it is safe and effective for you

## 2022-08-12 DIAGNOSIS — R10.9 RIGHT FLANK PAIN: ICD-10-CM

## 2022-08-12 DIAGNOSIS — F41.1 GENERALIZED ANXIETY DISORDER: Primary | ICD-10-CM

## 2022-08-12 LAB
ALBUMIN SERPL-MCNC: 4.1 G/DL (ref 3.6–5.1)
ALBUMIN/GLOB SERPL: 1.6 (CALC) (ref 1–2.5)
ALP SERPL-CCNC: 46 U/L (ref 31–125)
ALT SERPL-CCNC: 12 U/L (ref 6–29)
AST SERPL-CCNC: 14 U/L (ref 10–30)
BASOPHILS # BLD AUTO: 60 CELLS/UL (ref 0–200)
BASOPHILS NFR BLD AUTO: 0.7 %
BILIRUB SERPL-MCNC: 0.4 MG/DL (ref 0.2–1.2)
BUN SERPL-MCNC: 11 MG/DL (ref 7–25)
BUN/CREAT SERPL: NORMAL (CALC) (ref 6–22)
CALCIUM SERPL-MCNC: 9.5 MG/DL (ref 8.6–10.2)
CHLORIDE SERPL-SCNC: 102 MMOL/L (ref 98–110)
CO2 SERPL-SCNC: 27 MMOL/L (ref 20–32)
CREAT SERPL-MCNC: 0.63 MG/DL (ref 0.5–0.97)
EOSINOPHIL # BLD AUTO: 17 CELLS/UL (ref 15–500)
EOSINOPHIL NFR BLD AUTO: 0.2 %
ERYTHROCYTE [DISTWIDTH] IN BLOOD BY AUTOMATED COUNT: 13.2 % (ref 11–15)
GFR/BSA.PRED SERPLBLD CYS-BASED-ARV: 119 ML/MIN/1.73M2
GLOBULIN SER CALC-MCNC: 2.6 G/DL (CALC) (ref 1.9–3.7)
GLUCOSE SERPL-MCNC: 84 MG/DL (ref 65–99)
HCT VFR BLD AUTO: 39.5 % (ref 35–45)
HGB BLD-MCNC: 13.4 G/DL (ref 11.7–15.5)
LYMPHOCYTES # BLD AUTO: 2554 CELLS/UL (ref 850–3900)
LYMPHOCYTES NFR BLD AUTO: 29.7 %
MCH RBC QN AUTO: 30.9 PG (ref 27–33)
MCHC RBC AUTO-ENTMCNC: 33.9 G/DL (ref 32–36)
MCV RBC AUTO: 91 FL (ref 80–100)
MONOCYTES # BLD AUTO: 628 CELLS/UL (ref 200–950)
MONOCYTES NFR BLD AUTO: 7.3 %
NEUTROPHILS # BLD AUTO: 5341 CELLS/UL (ref 1500–7800)
NEUTROPHILS NFR BLD AUTO: 62.1 %
PLATELET # BLD AUTO: 224 THOUSAND/UL (ref 140–400)
PMV BLD REES-ECKER: 9.9 FL (ref 7.5–12.5)
POTASSIUM SERPL-SCNC: 4.7 MMOL/L (ref 3.5–5.3)
PROT SERPL-MCNC: 6.7 G/DL (ref 6.1–8.1)
RBC # BLD AUTO: 4.34 MILLION/UL (ref 3.8–5.1)
SODIUM SERPL-SCNC: 135 MMOL/L (ref 135–146)
WBC # BLD AUTO: 8.6 THOUSAND/UL (ref 3.8–10.8)

## 2022-08-12 RX ORDER — GABAPENTIN 100 MG/1
CAPSULE ORAL
Qty: 60 CAPSULE | Refills: 1 | Status: SHIPPED | OUTPATIENT
Start: 2022-08-12

## 2022-08-12 RX ORDER — TRAZODONE HYDROCHLORIDE 50 MG/1
TABLET ORAL
Qty: 60 TABLET | Refills: 0 | Status: SHIPPED | OUTPATIENT
Start: 2022-08-12 | End: 2022-09-04

## 2022-08-16 ENCOUNTER — OFFICE VISIT (OUTPATIENT)
Dept: OBGYN CLINIC | Facility: CLINIC | Age: 37
End: 2022-08-16
Payer: COMMERCIAL

## 2022-08-16 VITALS
BODY MASS INDEX: 44.49 KG/M2 | DIASTOLIC BLOOD PRESSURE: 70 MMHG | SYSTOLIC BLOOD PRESSURE: 118 MMHG | HEIGHT: 64 IN | WEIGHT: 260.6 LBS

## 2022-08-16 DIAGNOSIS — N92.0 MENORRHAGIA WITH REGULAR CYCLE: ICD-10-CM

## 2022-08-16 DIAGNOSIS — Z12.4 CERVICAL CANCER SCREENING: Primary | ICD-10-CM

## 2022-08-16 DIAGNOSIS — Z11.51 SCREENING FOR HPV (HUMAN PAPILLOMAVIRUS): ICD-10-CM

## 2022-08-16 DIAGNOSIS — Z12.4 SCREENING FOR CERVICAL CANCER: ICD-10-CM

## 2022-08-16 DIAGNOSIS — Z01.419 ENCOUNTER FOR ANNUAL ROUTINE GYNECOLOGICAL EXAMINATION: ICD-10-CM

## 2022-08-16 PROCEDURE — S0610 ANNUAL GYNECOLOGICAL EXAMINA: HCPCS | Performed by: OBSTETRICS & GYNECOLOGY

## 2022-08-16 RX ORDER — TRANEXAMIC ACID 650 MG/1
1300 TABLET ORAL 3 TIMES DAILY
Qty: 18 TABLET | Refills: 3 | Status: SHIPPED | OUTPATIENT
Start: 2022-08-16

## 2022-08-16 NOTE — PROGRESS NOTES
Assessment/Plan:    Pap and HPV done    Discussed self breast exams    Heavy menses-we discussed the use of Lysteda, she would like to try this and prescription was sent  Reviewed the instructions and side effects and risks  Contraception-she is unsure about future pregnancy  We briefly discussed AMA issues  Her weight is also an issue for future pregnancy although she states that she recently lost 80 lb  We reviewed contraception such as oral contraceptives, and IUD as she had questions about this  discussed preventive care, regular exercise and a healthy diet      No problem-specific Assessment & Plan notes found for this encounter  Diagnoses and all orders for this visit:    Cervical cancer screening  -     Thinprep Tis and HPV mRNA E6/E7    Encounter for annual routine gynecological examination  -     Thinprep Tis and HPV mRNA E6/E7    Menorrhagia with regular cycle  -     Tranexamic Acid 650 MG TABS; Take 2 tablets (1,300 mg total) by mouth 3 (three) times a day During heavy days of menstrual cycle, maximum 5 days of use per month    Screening for HPV (human papillomavirus)  -     Thinprep Tis and HPV mRNA E6/E7          Subjective:      Patient ID: Feng Maloney is a 30 y o  female  New patient-30year-old female presents for yearly  Last exam was about 3 years ago  Menses are regular, but she will have a very heavy cycle about 4 times a year with 2 heavy days  She is not using contraception  She is contemplating another pregnancy  Past medical and surgical history reviewed      The following portions of the patient's history were reviewed and updated as appropriate: allergies, current medications, past family history, past medical history, past social history, past surgical history and problem list     Review of Systems   Constitutional: Negative  Gastrointestinal: Negative  Genitourinary: Negative  Objective: There were no vitals taken for this visit  Physical Exam  Vitals reviewed  Constitutional:       Appearance: She is well-developed  Neck:      Thyroid: No thyromegaly  Trachea: No tracheal deviation  Cardiovascular:      Rate and Rhythm: Normal rate and regular rhythm  Pulmonary:      Effort: Pulmonary effort is normal       Breath sounds: Normal breath sounds  Chest:   Breasts: Breasts are symmetrical       Right: No inverted nipple, mass, nipple discharge, skin change or tenderness  Left: No inverted nipple, mass, nipple discharge, skin change or tenderness  Abdominal:      General: There is no distension  Palpations: Abdomen is soft  There is no mass  Tenderness: There is no abdominal tenderness  Genitourinary:     Labia:         Right: No rash, tenderness, lesion or injury  Left: No rash, tenderness, lesion or injury  Vagina: Normal       Cervix: No cervical motion tenderness, discharge or friability  Adnexa:         Right: No mass, tenderness or fullness  Left: No mass, tenderness or fullness

## 2022-08-18 LAB
CLINICAL INFO: NORMAL
CYTO CVX: NORMAL
CYTOLOGY CMNT CVX/VAG CYTO-IMP: NORMAL
DATE PREVIOUS BX: NORMAL
HPV E6+E7 MRNA CVX QL NAA+PROBE: NOT DETECTED
LMP START DATE: NORMAL
SL AMB PREV. PAP:: NORMAL
SPECIMEN SOURCE CVX/VAG CYTO: NORMAL

## 2022-09-02 ENCOUNTER — TELEPHONE (OUTPATIENT)
Dept: FAMILY MEDICINE CLINIC | Facility: CLINIC | Age: 37
End: 2022-09-02

## 2022-09-04 DIAGNOSIS — F41.1 GENERALIZED ANXIETY DISORDER: ICD-10-CM

## 2022-09-04 RX ORDER — TRAZODONE HYDROCHLORIDE 50 MG/1
TABLET ORAL
Qty: 60 TABLET | Refills: 0 | Status: SHIPPED | OUTPATIENT
Start: 2022-09-04 | End: 2022-09-27

## 2022-09-13 DIAGNOSIS — E03.9 HYPOTHYROIDISM, UNSPECIFIED TYPE: ICD-10-CM

## 2022-09-13 RX ORDER — LEVOTHYROXINE SODIUM 0.05 MG/1
50 TABLET ORAL DAILY
Qty: 30 TABLET | Refills: 5 | Status: SHIPPED | OUTPATIENT
Start: 2022-09-13

## 2022-09-27 DIAGNOSIS — F41.1 GENERALIZED ANXIETY DISORDER: ICD-10-CM

## 2022-09-27 RX ORDER — TRAZODONE HYDROCHLORIDE 50 MG/1
TABLET ORAL
Qty: 60 TABLET | Refills: 0 | Status: SHIPPED | OUTPATIENT
Start: 2022-09-27

## 2022-10-05 ENCOUNTER — TELEPHONE (OUTPATIENT)
Dept: PSYCHIATRY | Facility: CLINIC | Age: 37
End: 2022-10-05

## 2022-11-01 ENCOUNTER — OFFICE VISIT (OUTPATIENT)
Dept: FAMILY MEDICINE CLINIC | Facility: HOSPITAL | Age: 37
End: 2022-11-01

## 2022-11-01 VITALS
TEMPERATURE: 96.8 F | HEART RATE: 66 BPM | DIASTOLIC BLOOD PRESSURE: 72 MMHG | WEIGHT: 277 LBS | HEIGHT: 64 IN | BODY MASS INDEX: 47.29 KG/M2 | SYSTOLIC BLOOD PRESSURE: 100 MMHG

## 2022-11-01 DIAGNOSIS — R10.9 RIGHT FLANK PAIN: ICD-10-CM

## 2022-11-01 DIAGNOSIS — L67.8 ABNORMAL FACIAL HAIR: ICD-10-CM

## 2022-11-01 DIAGNOSIS — F33.1 MODERATE EPISODE OF RECURRENT MAJOR DEPRESSIVE DISORDER (HCC): ICD-10-CM

## 2022-11-01 DIAGNOSIS — H69.83 DYSFUNCTION OF BOTH EUSTACHIAN TUBES: ICD-10-CM

## 2022-11-01 DIAGNOSIS — Z23 ENCOUNTER FOR IMMUNIZATION: ICD-10-CM

## 2022-11-01 DIAGNOSIS — I10 ESSENTIAL HYPERTENSION: Primary | ICD-10-CM

## 2022-11-01 DIAGNOSIS — F41.1 GENERALIZED ANXIETY DISORDER: ICD-10-CM

## 2022-11-01 RX ORDER — BISOPROLOL FUMARATE 10 MG/1
10 TABLET, FILM COATED ORAL DAILY
Qty: 30 TABLET | Refills: 5
Start: 2022-11-01

## 2022-11-01 NOTE — PROGRESS NOTES
Name: María Herrera      : 1985      MRN: [id number]  Encounter Provider: Rosana Frausto DO  Encounter Date: 2022   Encounter [facility] [address] [facility]  Essential hypertension  Assessment & Plan:  Bp a bit low today, pt states it always goes down when she stops drinking, will stop the 5 mg of Bisoprolol and con't just the 10 mg, check BP at home and call if > 140/90, re-eval in 3 mos    Orders:  -     bisoprolol (ZEBETA) 10 MG tablet; Take 1 tablet (10 mg total) by mouth daily    2  Moderate episode of recurrent major depressive disorder (HCC)  Assessment & Plan:  Mood con't to be well controlled with current Effexor and Trazodone, call with new/worse mood      3  Generalized anxiety disorder  Assessment & Plan:  Mood con't to be well controlled with current Effexor and Trazodone, has hydroxyzine for prn use, call with new/worse mood      4  Right flank pain  Assessment & Plan:  Reporting mild benefit with Gabapentin but only taking 100 mg 1 tab PO q day, max dose reviewed - pt states when she increased to 100 mg bid she felt joint pain was worse - indications for Gabapentin reviewed - she wants to try and re-titrate up to 100 mg 1 tab tid, re-eval in 3 mos      5  Dysfunction of both eustachian tubes  Comments:  Reassured no s/sx of infection and minimal fluid, con't OTC Flonase and antihistamine, call with worse pain/drainage/blood drainage/F/C    6  Abnormal facial hair  Comments:  Check TSH/FSH/LH and testosterone, just had nml PAP and reports nml menses  Orders:  -     Testosterone; Future  -     FSH and LH; Future  -     TSH, 3rd generation with Free T4 reflex  -     Testosterone  -     FSH and LH      PAP     BW     Flu vaccine recommended and given today      Subjective      HPI Pt here for follow up appt    BP at goal today and meds were reviewed and no changes have occurred    She denies missing doses of meds or SE with the meds  She does not check her BP outside the office  She notes no frequent Ha's/dizziness/double vision/CP  She notes con't benefit with her Effexor  She is using Trazodone qhs and is sleeping well  She has hydroxyzine for prn use - has only used 1 tab in the past month  She has finish IOP and is now on general OP and is in therapy approx 7 hrs a week  She is taking Gabapentin 100 mg 1 tab PO once daily  She notes UTT the 1 tab bid as she felt it made her joint pain worse  She still has some R side pain but thinks it may be a bit more dull with the rx  Is willing to try and re-titrate to 1 tab tid  Pt noting B/L ear pain for approx a week or so  She had a cold a week ago and now feels it has "gone to her ears" - worse the past 3-4 days  Uses Flonase and OTC antihistamine daily  Noting a lot more facial hair growth - on chin and even cheek  She notes menses are normal and just saw GYN and had PAP  Last BS were normal 8/22  Review of Systems   Constitutional: Negative for chills and fever  HENT: Positive for congestion and ear pain  Negative for ear discharge and sore throat  Eyes: Negative for pain and visual disturbance  Respiratory: Positive for cough  Negative for shortness of breath and wheezing  Cardiovascular: Negative for chest pain and palpitations  Gastrointestinal: Negative for abdominal pain, constipation, diarrhea, nausea and vomiting  Genitourinary: Negative for difficulty urinating and dysuria  Musculoskeletal: Negative for back pain and neck pain  Skin: Negative for rash and wound  Neurological: Negative for dizziness, light-headedness and headaches  Psychiatric/Behavioral: Negative for dysphoric mood and sleep disturbance  The patient is not nervous/anxious          Current Outpatient Medications on File Prior to Visit   Medication Sig   • disulfiram (ANTABUSE) 250 mg tablet TAKE 1 TABLET BY MOUTH EVERYDAY AS NEEDED IN HIGH RISK SETTINGS   • fluticasone (FLONASE) 50 mcg/act nasal spray 1 spray into each nostril daily   • gabapentin (Neurontin) 100 mg capsule 1 tab PO qhs x 2 wks then increase to 2 tab PO qhs and stay on that   • hydrOXYzine pamoate (VISTARIL) 50 mg capsule TAKE 1 CAPSULE BY MOUTH EVERY DAY AS NEEDED NO MORE THAN 6 CAPSULES IN 24 HOURS   • levothyroxine 50 mcg tablet Take 1 tablet (50 mcg total) by mouth daily   • Multiple Vitamins-Minerals (MULTIVITAMIN ADULT PO) Take 1 tablet by mouth daily   • naltrexone (REVIA) 50 mg tablet Take 50 mg by mouth daily   • Tranexamic Acid 650 MG TABS Take 2 tablets (1,300 mg total) by mouth 3 (three) times a day During heavy days of menstrual cycle, maximum 5 days of use per month   • traZODone (DESYREL) 50 mg tablet TAKE 1-2 TABLETS BY MOUTH AT BEDTIME AS NEEDED FOR SLEEP   • venlafaxine (EFFEXOR-XR) 150 mg 24 hr capsule Take 1 capsule (150 mg total) by mouth daily   • venlafaxine (EFFEXOR-XR) 75 mg 24 hr capsule 75 mg + 150 mg = 225 mg daily   • [DISCONTINUED] bisoprolol (ZEBETA) 10 MG tablet 5 mg + 10 mg = 15 mg daily   • [DISCONTINUED] bisoprolol (ZEBETA) 5 mg tablet 5 mg + 10 mg = 15 mg daily   • [DISCONTINUED] venlafaxine (EFFEXOR-XR) 75 mg 24 hr capsule TAKE 1 CAPSULE BY MOUTH EVERYDAY AT 8AM       Objective     /72   Pulse 66   Temp (!) 96 8 °F (36 °C) (Tympanic)   Ht 5' 4" (1 626 m)   Wt 126 kg (277 lb)   BMI 47 55 kg/m²     Physical Exam  Vitals and nursing note reviewed  Constitutional:       General: She is not in acute distress  Appearance: She is well-developed  She is not ill-appearing  HENT:      Head: Normocephalic and atraumatic  Right Ear: Tympanic membrane and external ear normal  There is no impacted cerumen  Left Ear: Tympanic membrane and external ear normal  There is no impacted cerumen  Eyes:      General:         Right eye: No discharge  Left eye: No discharge        Conjunctiva/sclera: Conjunctivae normal    Neck:      Trachea: No tracheal deviation  Cardiovascular:      Rate and Rhythm: Normal rate and regular rhythm  Heart sounds: Normal heart sounds  No murmur heard  No friction rub  Pulmonary:      Effort: Pulmonary effort is normal  No respiratory distress  Breath sounds: Normal breath sounds  No wheezing, rhonchi or rales  Abdominal:      General: There is no distension  Palpations: Abdomen is soft  Tenderness: There is no abdominal tenderness  There is no guarding or rebound  Musculoskeletal:      Cervical back: Neck supple  Right lower leg: No edema  Left lower leg: No edema  Skin:     General: Skin is warm  Coloration: Skin is not pale  Findings: No rash  Neurological:      General: No focal deficit present  Mental Status: She is alert  Motor: No abnormal muscle tone  Gait: Gait normal    Psychiatric:         Mood and Affect: Mood normal          Behavior: Behavior normal          Thought Content:  Thought content normal          Judgment: Judgment normal        Blake Moore DO

## 2022-11-01 NOTE — ASSESSMENT & PLAN NOTE
Reporting mild benefit with Gabapentin but only taking 100 mg 1 tab PO q day, max dose reviewed - pt states when she increased to 100 mg bid she felt joint pain was worse - indications for Gabapentin reviewed - she wants to try and re-titrate up to 100 mg 1 tab tid, re-eval in 3 mos

## 2022-11-01 NOTE — ASSESSMENT & PLAN NOTE
Bp a bit low today, pt states it always goes down when she stops drinking, will stop the 5 mg of Bisoprolol and con't just the 10 mg, check BP at home and call if > 140/90, re-eval in 3 mos

## 2022-11-01 NOTE — ASSESSMENT & PLAN NOTE
Mood con't to be well controlled with current Effexor and Trazodone, has hydroxyzine for prn use, call with new/worse mood

## 2022-11-10 LAB
FSH SERPL-ACNC: 3.7 MIU/ML
LH SERPL-ACNC: 5.3 MIU/ML
TESTOST SERPL-MCNC: 21 NG/DL (ref 2–45)
TSH SERPL-ACNC: 1.01 MIU/L

## 2022-11-29 ENCOUNTER — OFFICE VISIT (OUTPATIENT)
Dept: FAMILY MEDICINE CLINIC | Facility: HOSPITAL | Age: 37
End: 2022-11-29

## 2022-11-29 VITALS
DIASTOLIC BLOOD PRESSURE: 84 MMHG | SYSTOLIC BLOOD PRESSURE: 118 MMHG | WEIGHT: 271.6 LBS | HEIGHT: 64 IN | HEART RATE: 80 BPM | TEMPERATURE: 98.2 F | BODY MASS INDEX: 46.37 KG/M2

## 2022-11-29 DIAGNOSIS — M25.551 HIP PAIN, ACUTE, RIGHT: ICD-10-CM

## 2022-11-29 DIAGNOSIS — H65.191 OTHER NON-RECURRENT ACUTE NONSUPPURATIVE OTITIS MEDIA OF RIGHT EAR: Primary | ICD-10-CM

## 2022-11-29 DIAGNOSIS — I10 ESSENTIAL HYPERTENSION: ICD-10-CM

## 2022-11-29 RX ORDER — AMOXICILLIN AND CLAVULANATE POTASSIUM 875; 125 MG/1; MG/1
1 TABLET, FILM COATED ORAL EVERY 12 HOURS SCHEDULED
Qty: 14 TABLET | Refills: 0 | Status: SHIPPED | OUTPATIENT
Start: 2022-11-29 | End: 2022-12-06

## 2022-11-29 NOTE — PROGRESS NOTES
Name: Eliz Pierson      : 1985      MRN: [id number]  Encounter Provider: Lucita Ayala DO  Encounter Date: 2022   Encounter department: [facility]      1  Other non-recurrent acute nonsuppurative otitis media of right ear  Comments:  Rx for Augmentin sent, gargles/hot tea/lozenges/rest/fluids/Flonase encouraged, can use Tylenol and warm compresses for pain, call with new/worse symptoms  Orders:  -     amoxicillin-clavulanate (AUGMENTIN) 875-125 mg per tablet; Take 1 tablet by mouth every 12 (twelve) hours for 7 days    2  Hip pain, acute, right  Comments:  Likely d/t overuse with new job, out of work rest of week and return 22 with limit of 4-5 hr shifts for now, call with persistent/new/worse symptoms    3  Essential hypertension  Assessment & Plan:  BP improved and at goal by end of appt, con't current medication        PAP     BW       Subjective      HPI Pt here for an acute visit    Pt here with approx 6- 7 days of cold symptoms  Symptoms started with body aches/sweats  She did not check her temp as her thermometer was not working  Her kids were seen in the ED and tested + for the flu   She is now coughing and has some congestion and runny nose  She notes ST and R ear pain  She had some discharge from the R ear and crackling in the R ear  She has chronic tinnitus but states "this is escalated"  She has been using Ibuprofen and nasal sprays  She feels better over all but was concerned about her R ear symptoms  Noting a new job at [employer] 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  Has had L ankle and R hip pain  Ankle pain has improved but R hip pain persisting  Notes some upper R back pain but no R low back pain  No radiation into RLE and no weakness/numbness noted  Bp elevated on presentation  She is taking her Bisoprolol daily as directed    She notes no frequent OROPEZA's/dizziness/double vision  Review of Systems   Constitutional: Positive for appetite change, chills and fatigue  Negative for fever  HENT: Positive for ear discharge, ear pain and sore throat  Negative for trouble swallowing  Eyes: Negative for discharge, redness and itching  Respiratory: Positive for cough  Negative for shortness of breath and wheezing  Gastrointestinal: Positive for nausea  Negative for diarrhea  Genitourinary: Negative for difficulty urinating and dysuria  Musculoskeletal: Positive for arthralgias and back pain  Skin: Negative for rash  Neurological: Negative for dizziness and headaches  Hematological: Negative for adenopathy         Current Outpatient Medications on File Prior to Visit   Medication Sig   • bisoprolol (ZEBETA) 10 MG tablet Take 1 tablet (10 mg total) by mouth daily   • disulfiram (ANTABUSE) 250 mg tablet TAKE 1 TABLET BY MOUTH EVERYDAY AS NEEDED IN HIGH RISK SETTINGS   • fluticasone (FLONASE) 50 mcg/act nasal spray 1 spray into each nostril daily   • gabapentin (NEURONTIN) 100 mg capsule TAKE 1 CAPSULE BY MOUTH TID   • hydrOXYzine pamoate (VISTARIL) 50 mg capsule TAKE 1 CAPSULE BY MOUTH EVERY DAY AS NEEDED NO MORE THAN 6 CAPSULES IN 24 HOURS   • levothyroxine 50 mcg tablet Take 1 tablet (50 mcg total) by mouth daily   • Multiple Vitamins-Minerals (MULTIVITAMIN ADULT PO) Take 1 tablet by mouth daily   • naltrexone (REVIA) 50 mg tablet Take 50 mg by mouth daily   • Tranexamic Acid 650 MG TABS Take 2 tablets (1,300 mg total) by mouth 3 (three) times a day During heavy days of menstrual cycle, maximum 5 days of use per month   • traZODone (DESYREL) 50 mg tablet TAKE 1 TO 2 TABLETS BY MOUTH AT BEDTIME AS NEEDED FOR SLEEP   • venlafaxine (EFFEXOR-XR) 150 mg 24 hr capsule Take 1 capsule (150 mg total) by mouth daily   • venlafaxine (EFFEXOR-XR) 75 mg 24 hr capsule 75 mg + 150 mg = 225 mg daily       Objective     /84   Pulse 80   Temp 98 2 °F (36 8 °C) (Tympanic)   Ht 5' 4" (1 626 m)   Wt 123 kg (271 lb 9 6 oz)   BMI 46 62 kg/m²     Physical Exam  Vitals and nursing note reviewed  Constitutional:       General: She is not in acute distress  Appearance: She is well-developed and well-nourished  She is not ill-appearing  HENT:      Head: Normocephalic and atraumatic  Left Ear: Tympanic membrane and external ear normal  There is no impacted cerumen  Ears:      Comments: R canal and TM erythematous with retrataction  Eyes:      General:         Right eye: No discharge  Left eye: No discharge  Conjunctiva/sclera: Conjunctivae normal    Neck:      Trachea: No tracheal deviation  Cardiovascular:      Rate and Rhythm: Normal rate and regular rhythm  Heart sounds: Normal heart sounds  No murmur heard  No friction rub  Pulmonary:      Effort: Pulmonary effort is normal  No respiratory distress  Breath sounds: Normal breath sounds  No wheezing, rhonchi or rales  Abdominal:      General: There is no distension  Tenderness: There is no abdominal tenderness  There is no guarding or rebound  Musculoskeletal:         General: No swelling, deformity, signs of injury or edema  Cervical back: Neck supple  Comments: Lumbar spine: no pain with palp of spinous processes  R hip: no pain with flexions or external rotation, some pain with internal rotation   Lymphadenopathy:      Cervical: Cervical adenopathy present  Skin:     General: Skin is warm  Coloration: Skin is not pale  Findings: No rash  Neurological:      General: No focal deficit present  Mental Status: She is alert  Sensory: No sensory deficit  Motor: No abnormal muscle tone  Gait: Gait normal    Psychiatric:         Mood and Affect: Mood and affect and mood normal          Behavior: Behavior normal          Thought Content:  Thought content normal          Judgment: Judgment normal        Radha Gan DO

## 2022-11-29 NOTE — LETTER
November 29, 2022     Patient: Eyal Carmen  YOB: 1985  Date of Visit: 11/29/2022      To Whom it May Concern:    Eyal Carmen is under my professional care  Susan was seen in my office on 11/29/2022  Please excuse Susan from work 11/26/22 to 11/4/2022 due to flu like symptoms  Giovanni Holbrook may return to work with limitations on 12/5/2022  Please allow Susan to resume normal activities with reduced hours to 4-5 hrs at a time  If you have any questions or concerns, please don't hesitate to call           Sincerely,          Raul Chambers DO        CC: No Recipients

## 2022-12-03 DIAGNOSIS — F41.1 GENERALIZED ANXIETY DISORDER: ICD-10-CM

## 2022-12-03 RX ORDER — TRAZODONE HYDROCHLORIDE 50 MG/1
TABLET ORAL
Qty: 60 TABLET | Refills: 0 | Status: SHIPPED | OUTPATIENT
Start: 2022-12-03

## 2023-01-01 DIAGNOSIS — F41.1 GENERALIZED ANXIETY DISORDER: ICD-10-CM

## 2023-01-01 RX ORDER — TRAZODONE HYDROCHLORIDE 50 MG/1
TABLET ORAL
Qty: 60 TABLET | Refills: 0 | Status: SHIPPED | OUTPATIENT
Start: 2023-01-01

## 2023-01-13 DIAGNOSIS — F41.1 GENERALIZED ANXIETY DISORDER: ICD-10-CM

## 2023-01-13 RX ORDER — VENLAFAXINE HYDROCHLORIDE 150 MG/1
150 CAPSULE, EXTENDED RELEASE ORAL DAILY
Qty: 30 CAPSULE | Refills: 0 | Status: SHIPPED | OUTPATIENT
Start: 2023-01-13

## 2023-01-27 DIAGNOSIS — F41.1 GENERALIZED ANXIETY DISORDER: ICD-10-CM

## 2023-01-27 DIAGNOSIS — I10 ESSENTIAL HYPERTENSION: ICD-10-CM

## 2023-01-27 RX ORDER — TRAZODONE HYDROCHLORIDE 50 MG/1
TABLET ORAL
Qty: 60 TABLET | Refills: 0 | Status: SHIPPED | OUTPATIENT
Start: 2023-01-27 | End: 2023-02-02

## 2023-01-27 RX ORDER — BISOPROLOL FUMARATE 10 MG/1
TABLET, FILM COATED ORAL
Qty: 30 TABLET | Refills: 5 | Status: SHIPPED | OUTPATIENT
Start: 2023-01-27 | End: 2023-02-02

## 2023-01-30 DIAGNOSIS — F41.1 GENERALIZED ANXIETY DISORDER: ICD-10-CM

## 2023-01-30 RX ORDER — VENLAFAXINE HYDROCHLORIDE 75 MG/1
CAPSULE, EXTENDED RELEASE ORAL
Qty: 30 CAPSULE | Refills: 5 | Status: SHIPPED | OUTPATIENT
Start: 2023-01-30

## 2023-02-02 ENCOUNTER — OFFICE VISIT (OUTPATIENT)
Dept: FAMILY MEDICINE CLINIC | Facility: HOSPITAL | Age: 38
End: 2023-02-02

## 2023-02-02 VITALS
DIASTOLIC BLOOD PRESSURE: 90 MMHG | HEIGHT: 64 IN | SYSTOLIC BLOOD PRESSURE: 132 MMHG | TEMPERATURE: 98 F | WEIGHT: 282.4 LBS | HEART RATE: 84 BPM | BODY MASS INDEX: 48.21 KG/M2

## 2023-02-02 DIAGNOSIS — R10.9 RIGHT FLANK PAIN: ICD-10-CM

## 2023-02-02 DIAGNOSIS — F41.1 GENERALIZED ANXIETY DISORDER: ICD-10-CM

## 2023-02-02 DIAGNOSIS — F33.1 MODERATE EPISODE OF RECURRENT MAJOR DEPRESSIVE DISORDER (HCC): ICD-10-CM

## 2023-02-02 DIAGNOSIS — I10 ESSENTIAL HYPERTENSION: Primary | ICD-10-CM

## 2023-02-02 PROBLEM — F10.931 ALCOHOL WITHDRAWAL DELIRIUM, ACUTE, HYPERACTIVE (HCC): Status: RESOLVED | Noted: 2022-06-03 | Resolved: 2023-02-02

## 2023-02-02 RX ORDER — BISOPROLOL FUMARATE 10 MG/1
10 TABLET, FILM COATED ORAL DAILY
Qty: 30 TABLET | Refills: 5
Start: 2023-02-02

## 2023-02-02 RX ORDER — TRAZODONE HYDROCHLORIDE 100 MG/1
100 TABLET ORAL
Qty: 90 TABLET | Refills: 1 | Status: SHIPPED | OUTPATIENT
Start: 2023-02-02

## 2023-02-02 NOTE — PROGRESS NOTES
Name: Tanja Ford      : 1985      MRN: [id number]  Encounter Provider: Denilson Polanco DO  Encounter Date: 2023   Encounter department: [facility]  Essential hypertension  Assessment & Plan:  BP better by end of appt, DBP still a bit above goal - pt actively drinking coffee in visit, urged no caffeine 1 hr prior to appt next time, con't current meds for now    Orders:  -     bisoprolol (ZEBETA) 10 MG tablet; Take 1 tablet (10 mg total) by mouth daily    2  Right flank pain  Assessment & Plan:  Improved, tolerating Gabapentin      3  Generalized anxiety disorder  Assessment & Plan:  Anxiety still up despite max Effexor, not using hydroxyzine regularly but has for prn use, on Gabapentin for hip/flank pain - discussed adding a third medication for mood OR just titrating up Gabapentin as can see benefit with anxiety - pt agreeable to try increase in Gabapentin - will increase from 100 mg bid to 100 mg tid x 2 wks then will adjust gradually to 100 mg 2 tab tid as tolerated, SE reviewed - call if they occur, re-eval in 2 mos    Orders:  -     traZODone (DESYREL) 100 mg tablet; Take 1 tablet (100 mg total) by mouth daily at bedtime    4  Moderate episode of recurrent major depressive disorder [facility])  Assessment & Plan:  Well controlled with current Effexor, call with new/worse mood        PAP     BW       Subjective      HPI Pt here for follow up appt    DBP above goal today and SBP upper end of acceptable as well  Meds were reviewed and no changes have occurred  She denies missing doses of meds or SE with the meds  She does not check her BP outside the office  She notes no frequent dizziness/double vision/CP  She has had some HA's since she had COVID recently  Last routine visit in Nov we increased Gabapentin from 100 mg 1 tab daily to 100 mg tid d/t persistent chronic R flank/back pain    She notes her hip/flank pain has improved  She is currently taking the Gabapentin    She con't to take her Effexor once daily as directed  She notes "if I don't take it on time its emotionally noticeable"  She is using Trazodone 50 mg 2 tab every night with great benefit  She still feels anxious but notes her depression is well controlled  She does feel tired a lot  She has not used the hydroxyzine recently  Review of Systems   Constitutional: Positive for fatigue  Negative for chills and fever  HENT: Negative for congestion and sore throat  Eyes: Negative for pain and visual disturbance  Respiratory: Negative for cough, shortness of breath and wheezing  Cardiovascular: Negative for chest pain and palpitations  Gastrointestinal: Negative for abdominal pain, diarrhea and nausea  Genitourinary: Negative for difficulty urinating and dysuria  Musculoskeletal: Negative for back pain and neck pain  Skin: Negative for rash and wound  Neurological: Positive for headaches  Negative for dizziness, weakness, light-headedness and numbness  Hematological: Does not bruise/bleed easily  Psychiatric/Behavioral: Negative for dysphoric mood  The patient is nervous/anxious          Current Outpatient Medications on File Prior to Visit   Medication Sig   • disulfiram (ANTABUSE) 250 mg tablet TAKE 1 TABLET BY MOUTH EVERYDAY AS NEEDED IN HIGH RISK SETTINGS   • fluticasone (FLONASE) 50 mcg/act nasal spray 1 spray into each nostril daily   • gabapentin (NEURONTIN) 100 mg capsule TAKE 1 CAPSULE BY MOUTH TID   • hydrOXYzine pamoate (VISTARIL) 50 mg capsule TAKE 1 CAPSULE BY MOUTH EVERY DAY AS NEEDED NO MORE THAN 6 CAPSULES IN 24 HOURS   • levothyroxine 50 mcg tablet Take 1 tablet (50 mcg total) by mouth daily   • Multiple Vitamins-Minerals (MULTIVITAMIN ADULT PO) Take 1 tablet by mouth daily   • naltrexone (REVIA) 50 mg tablet Take 50 mg by mouth daily   • Tranexamic Acid 650 MG TABS Take 2 tablets (1,300 mg total) by mouth 3 (three) times a day During heavy days of menstrual cycle, maximum 5 days of use per month   • venlafaxine (EFFEXOR-XR) 150 mg 24 hr capsule Take 1 capsule (150 mg total) by mouth daily   • venlafaxine (EFFEXOR-XR) 75 mg 24 hr capsule TAKE 1 CAPSULE (75 MG) ALONG WITH 150 MG CAPSULE TO = 225 MG DAILY   • [DISCONTINUED] bisoprolol (ZEBETA) 10 MG tablet TAKE 1 TABLET (10 MG) ALONG WITH A 5MG TABLET TO = 15 MG DAILY   • [DISCONTINUED] traZODone (DESYREL) 50 mg tablet TAKE 1 TO 2 TABLETS BY MOUTH AT BEDTIME AS NEEDED FOR SLEEP       Objective     /90   Pulse 84   Temp 98 °F (36 7 °C) (Tympanic)   Ht 5' 4" (1 626 m)   Wt 128 kg (282 lb 6 4 oz)   BMI 48 47 kg/m²     Physical Exam  Vitals and nursing note reviewed  Constitutional:       General: She is not in acute distress  Appearance: She is well-developed  She is not ill-appearing  HENT:      Head: Normocephalic and atraumatic  Eyes:      General:         Right eye: No discharge  Left eye: No discharge  Conjunctiva/sclera: Conjunctivae normal    Neck:      Trachea: No tracheal deviation  Cardiovascular:      Rate and Rhythm: Normal rate and regular rhythm  Heart sounds: Normal heart sounds  No murmur heard  No friction rub  Pulmonary:      Effort: Pulmonary effort is normal  No respiratory distress  Breath sounds: Normal breath sounds  No wheezing, rhonchi or rales  Musculoskeletal:      Cervical back: Neck supple  Skin:     General: Skin is warm  Coloration: Skin is not pale  Findings: No rash  Neurological:      General: No focal deficit present  Mental Status: She is alert  Mental status is at baseline  Motor: No abnormal muscle tone  Gait: Gait normal    Psychiatric:         Mood and Affect: Mood normal          Behavior: Behavior normal          Thought Content:  Thought content normal          Judgment: Judgment normal        Myrtis Anger, DO

## 2023-02-02 NOTE — ASSESSMENT & PLAN NOTE
Anxiety still up despite max Effexor, not using hydroxyzine regularly but has for prn use, on Gabapentin for hip/flank pain - discussed adding a third medication for mood OR just titrating up Gabapentin as can see benefit with anxiety - pt agreeable to try increase in Gabapentin - will increase from 100 mg bid to 100 mg tid x 2 wks then will adjust gradually to 100 mg 2 tab tid as tolerated, SE reviewed - call if they occur, re-eval in 2 mos

## 2023-02-02 NOTE — ASSESSMENT & PLAN NOTE
BP better by end of appt, DBP still a bit above goal - pt actively drinking coffee in visit, urged no caffeine 1 hr prior to appt next time, con't current meds for now

## 2023-02-07 DIAGNOSIS — F41.1 GENERALIZED ANXIETY DISORDER: ICD-10-CM

## 2023-02-07 RX ORDER — VENLAFAXINE HYDROCHLORIDE 150 MG/1
CAPSULE, EXTENDED RELEASE ORAL
Qty: 30 CAPSULE | Refills: 0 | Status: SHIPPED | OUTPATIENT
Start: 2023-02-07

## 2023-02-23 DIAGNOSIS — F41.1 GENERALIZED ANXIETY DISORDER: ICD-10-CM

## 2023-02-23 RX ORDER — VENLAFAXINE HYDROCHLORIDE 75 MG/1
CAPSULE, EXTENDED RELEASE ORAL
Qty: 90 CAPSULE | Refills: 2 | Status: SHIPPED | OUTPATIENT
Start: 2023-02-23

## 2023-03-08 DIAGNOSIS — F41.1 GENERALIZED ANXIETY DISORDER: ICD-10-CM

## 2023-03-08 RX ORDER — VENLAFAXINE HYDROCHLORIDE 150 MG/1
CAPSULE, EXTENDED RELEASE ORAL
Qty: 90 CAPSULE | Refills: 1 | Status: SHIPPED | OUTPATIENT
Start: 2023-03-08

## 2023-03-11 ENCOUNTER — NURSE TRIAGE (OUTPATIENT)
Dept: OTHER | Facility: OTHER | Age: 38
End: 2023-03-11

## 2023-03-11 NOTE — TELEPHONE ENCOUNTER
Regarding: anxiety episode  ----- Message from Maranda Ceballos sent at 3/11/2023  6:30 PM EST -----  Pt's spouse called, " my wife had a big anxiety episode today at the sore  I don't think the medications are helping  her   I am very concerned for her "

## 2023-03-11 NOTE — TELEPHONE ENCOUNTER
Communication consent verified to speak with  Candace Arzate  Reason for Disposition  • Taking thyroid medications  • [1] Symptoms of anxiety or panic AND [2] has not been evaluated for this by physician    Answer Assessment - Initial Assessment Questions  1  CONCERN: "What happened that made you call today?"      Anxiety panic anxiety   Triggered in store     Panic attack in store checking out screaming in car I am not alright I need help couldn't turn car on   Drug regiman not working    Specifically asked is patient suicidal   Prescribed Clonipine but didn't start it  took   On thyroid med gabapentin , Effexor     2  ANXIETY SYMPTOM SCREENING: "Can you describe how you have been feeling?"  (e g , tense, restless, panicky, anxious, keyed up, trouble sleeping, trouble concentrating)    Yes     3  ONSET: "How long have you been feeling this way?"       1 week    4  RECURRENT: "Have you felt this way before?"  If Yes, ask: "What happened that time?" "What helped these feelings go away in the past?"          5  RISK OF HARM - SUICIDAL IDEATION:  "Do you ever have thoughts of hurting or killing yourself?"  (e g , yes, no, no but preoccupation with thoughts about death)    - INTENT:  "Do you have thoughts of hurting or killing yourself right NOW?" (e g , yes, no, N/A)    - PLAN: "Do you have a specific plan for how you would do this?" (e g , gun, knife, overdose, no plan, N/A)        Denies    6  RISK OF HARM - HOMICIDAL IDEATION:  "Do you ever have thoughts of hurting or killing someone else?"  (e g , yes, no, no but preoccupation with thoughts about death)    - INTENT:  "Do you have thoughts of hurting or killing someone right NOW?" (e g , yes, no, N/A)    - PLAN: "Do you have a specific plan for how you would do this?" (e g , gun, knife, no plan, N/A)        Denies      7  FUNCTIONAL IMPAIRMENT: "How have things been going for you overall?  Have you had more difficulty than usual doing your normal daily activities?"  (e g , better, same, worse; self-care, school, work, interactions)       Anxiety impacts her daily function recently    8  SUPPORT: "Who is with you now?" "Who do you live with?" "Do you have family or friends who you can talk to?"         is with patient and supportive    9  THERAPIST: "Do you have a counselor or therapist? Name?"       States has been on a waiting list for 2 years/   was in rehab     10  STRESSORS: "Has there been any new stress or recent changes in your life?"         Unsure     11  CAFFEINE USE: "Do you drink caffeinated beverages, and how much each day?" (e g , coffee, tea, bernadette)         Unsure    12  ALCOHOL USE OR SUBSTANCE USE (DRUG USE): "Do you drink alcohol or use any illegal drugs?"          Denies use and denies symptoms of withdrawal     13   OTHER SYMPTOMS: "Do you have any other physical symptoms right now?" (e g , chest pain, palpitations, difficulty breathing, fever)         Denies    Protocols used: ANXIETY AND PANIC ATTACK-ADULT-

## 2023-03-12 NOTE — TELEPHONE ENCOUNTER
Communication consent verified with  Haley Espana calling in concerned patient had 2nd anxiety attack of the week   would like to set up appointment to discuss her medication regimen and therapy  Denies  Suicidal ideation or homicidal ideation   Denies SOB , chest pain, drug or alcohol abuse or symptoms of withdrawal   Denies dizziness shaking or passing out  Appointment scheduled for 3 13 2023 with Dr Moe Boone

## 2023-03-13 ENCOUNTER — OFFICE VISIT (OUTPATIENT)
Dept: FAMILY MEDICINE CLINIC | Facility: HOSPITAL | Age: 38
End: 2023-03-13

## 2023-03-13 VITALS
OXYGEN SATURATION: 98 % | HEART RATE: 90 BPM | HEIGHT: 64 IN | WEIGHT: 270.6 LBS | BODY MASS INDEX: 46.2 KG/M2 | SYSTOLIC BLOOD PRESSURE: 144 MMHG | DIASTOLIC BLOOD PRESSURE: 92 MMHG | TEMPERATURE: 98.9 F

## 2023-03-13 DIAGNOSIS — R10.9 RIGHT FLANK PAIN: ICD-10-CM

## 2023-03-13 DIAGNOSIS — F41.1 GENERALIZED ANXIETY DISORDER: Primary | ICD-10-CM

## 2023-03-13 RX ORDER — BUSPIRONE HYDROCHLORIDE 7.5 MG/1
7.5 TABLET ORAL 2 TIMES DAILY
Qty: 60 TABLET | Refills: 2 | Status: SHIPPED | OUTPATIENT
Start: 2023-03-13

## 2023-03-13 NOTE — ASSESSMENT & PLAN NOTE
Left voicemail for patient to call back to relay result message.     No great benefit with Gabapentin and symptoms really not bothersome as long as she is avoiding ETOH, wean off Gabapentin, call with new/worse symptoms

## 2023-03-13 NOTE — ASSESSMENT & PLAN NOTE
Anxiety not well controlled - no real benefit with adding Gabapentin - to simplify regimen will wean off Gabapentin and try trial of Buspar 7 5 mg bid,  d/w pt that it takes 4-6 wks to get maximum benefit of med and that med has to be taken every day and to not miss doses of med, call with SE/new/worse mood, re-eval in 3-4 wks, discussed needing to see therapist and/or psych - referral to [facility] and will reach out to complex care and see if any further assistance if available to help pt get in with psych

## 2023-03-13 NOTE — PROGRESS NOTES
Name: Job Harrison      : 1985      MRN: [id number]  Encounter Provider: Cathy Hyman DO  Encounter Date: 3/13/2023   [facility]  Generalized anxiety disorder  Assessment & Plan:  Anxiety not well controlled - no real benefit with adding Gabapentin - to simplify regimen will wean off Gabapentin and try trial of Buspar 7 5 mg bid,  d/w pt that it takes 4-6 wks to get maximum benefit of med and that med has to be taken every day and to not miss doses of med, call with SE/new/worse mood, re-eval in 3-4 wks, discussed needing to see therapist and/or psych - referral to [facility] and will reach out to complex care and see if any further assistance if available to help pt get in with psych      Orders:  -     Ambulatory Referral to [facility]; Future  -     busPIRone (BUSPAR) 7 5 mg tablet; Take 1 tablet (7 5 mg total) by mouth 2 (two) times a day  -     Ambulatory Referral to Complex Care [facility] Program; Future    2  Right flank pain  Assessment & Plan:  No great benefit with Gabapentin and symptoms really not bothersome as long as she is avoiding ETOH, wean off Gabapentin, call with new/worse symptoms           Subjective      HPI Pt here for an acute visit    She notes recent "major freak outs" the past few weeks  She notes one was while in the car d/t another  and another was shopping and a  in her personal space  She notes no new stressors triggering the increase in panic attacks  She states she just feels "anxious all the time"  She was started on Gabapentin last time for anxiety and R flank pain  She is taking her Effexor daily as directed as well as Trazodone qhs  She has prn hydroxyzine but has not taken recently as she doesn't have them on her often  She is sleeping well and appetite is good   She doesn't feel down but gets anxious and feels "like I can't function, its like a tunnel"  She did drink once d/t her anxiety  Review of Systems   Constitutional: Negative for chills and fever  Respiratory: Negative for cough, shortness of breath and wheezing  Gastrointestinal: Negative for abdominal pain, diarrhea and nausea  Skin: Negative for rash and wound  Neurological: Negative for dizziness and headaches  Psychiatric/Behavioral: Negative for dysphoric mood and sleep disturbance  The patient is nervous/anxious  Current Outpatient Medications on File Prior to Visit   Medication Sig   • bisoprolol (ZEBETA) 10 MG tablet Take 1 tablet (10 mg total) by mouth daily   • disulfiram (ANTABUSE) 250 mg tablet TAKE 1 TABLET BY MOUTH EVERYDAY AS NEEDED IN HIGH RISK SETTINGS   • fluticasone (FLONASE) 50 mcg/act nasal spray 1 spray into each nostril daily   • gabapentin (NEURONTIN) 100 mg capsule TAKE 1 CAPSULE BY MOUTH TID   • hydrOXYzine pamoate (VISTARIL) 50 mg capsule TAKE 1 CAPSULE BY MOUTH EVERY DAY AS NEEDED NO MORE THAN 6 CAPSULES IN 24 HOURS   • levothyroxine 50 mcg tablet Take 1 tablet (50 mcg total) by mouth daily   • Multiple Vitamins-Minerals (MULTIVITAMIN ADULT PO) Take 1 tablet by mouth daily   • naltrexone (REVIA) 50 mg tablet Take 50 mg by mouth daily   • Tranexamic Acid 650 MG TABS Take 2 tablets (1,300 mg total) by mouth 3 (three) times a day During heavy days of menstrual cycle, maximum 5 days of use per month   • traZODone (DESYREL) 100 mg tablet Take 1 tablet (100 mg total) by mouth daily at bedtime   • venlafaxine (EFFEXOR-XR) 150 mg 24 hr capsule TAKE 1 CAPSULE BY MOUTH EVERY DAY   • venlafaxine (EFFEXOR-XR) 75 mg 24 hr capsule TAKE 1 CAPSULE (75 MG) ALONG WITH 150 MG CAPSULE TO = 225 MG DAILY       Objective     /92   Pulse 90   Temp 98 9 °F (37 2 °C)   Ht 5' 4" (1 626 m)   Wt 123 kg (270 lb 9 6 oz)   SpO2 98%   BMI 46 45 kg/m²     Physical Exam  Vitals and nursing note reviewed  Constitutional:       General: She is not in acute distress  Appearance: She is well-developed  She is not ill-appearing  HENT:      Head: Normocephalic and atraumatic  Eyes:      General:         Right eye: No discharge  Left eye: No discharge  Conjunctiva/sclera: Conjunctivae normal    Neck:      Trachea: No tracheal deviation  Pulmonary:      Effort: Pulmonary effort is normal  No respiratory distress  Skin:     General: Skin is warm  Coloration: Skin is not pale  Findings: No rash  Neurological:      Mental Status: She is alert  Motor: No abnormal muscle tone  Gait: Gait normal    Psychiatric:         Behavior: Behavior normal          Thought Content:  Thought content normal          Judgment: Judgment normal       Comments: Tearful at times       Floyde Ditch, DO

## 2023-03-15 ENCOUNTER — TELEPHONE (OUTPATIENT)
Dept: FAMILY MEDICINE CLINIC | Facility: HOSPITAL | Age: 38
End: 2023-03-15

## 2023-03-16 ENCOUNTER — PATIENT OUTREACH (OUTPATIENT)
Dept: FAMILY MEDICINE CLINIC | Facility: HOSPITAL | Age: 38
End: 2023-03-16

## 2023-03-16 DIAGNOSIS — Z71.89 COMPLEX CARE COORDINATION: Primary | ICD-10-CM

## 2023-03-16 NOTE — PROGRESS NOTES
Outpatient Care Management Note:    New direct referral received from Dr Rosette Celeste  Patient needs assistance with scheduling mental health services  CM will forward referral to our  to outreach and assess

## 2023-03-24 ENCOUNTER — PATIENT OUTREACH (OUTPATIENT)
Dept: FAMILY MEDICINE CLINIC | Facility: HOSPITAL | Age: 38
End: 2023-03-24

## 2023-03-24 NOTE — PROGRESS NOTES
RICHARD MOSLEY received referral from patient's PCP to assist patient with [facility] care [facility]  RICHARD MOSLEY reviewed patient's chart and called patient ([phone])  Patient did not answer  RICHARD MOSLEY left a message including RICHARD MOSLEY contact information and requested a call back  RICHARD MOSLEY will attempt to call again at a later date

## 2023-03-29 ENCOUNTER — PATIENT OUTREACH (OUTPATIENT)
Dept: FAMILY MEDICINE CLINIC | Facility: HOSPITAL | Age: 38
End: 2023-03-29

## 2023-03-29 NOTE — PROGRESS NOTES
RICHARD MOSLEY received referral from patient's PCP to assist patient with establish care with an OP [address] provider  RICHARD MOSLEY reviewed patient's chart and called patient ([phone]) a second time  Patient did not answer  RICHARD MOSLEY left a message including RICHARD MOSLEY contact information and requested a call back  RICHARD MOSLEY will place unable to reach letter in outgoing mail  RICHARD MOSLEY will close  Please re consult RICHARD MOSLEY as needed

## 2023-03-29 NOTE — LETTER
[facility]    Re: Val [place] to Reach   3/29/2023       Dear Logan isabel [address] [facility] [address] [facility] Work  and wanted to be certain you had information to contact me should you desire assistance with or have questions about non-medical aspects of your care such as [but not limited to] medical insurance, housing, transportation, material needs, or emergency needs  If I do not have an answer I will assist you in finding the appropriate agency or individual who can help  Please feel free to contact me at [phone]  Thank You      Sincerely,         Benja Dimas, MSW, LSW

## 2023-05-11 ENCOUNTER — TELEMEDICINE (OUTPATIENT)
Dept: BEHAVIORAL/MENTAL HEALTH CLINIC | Facility: CLINIC | Age: 38
End: 2023-05-11

## 2023-05-11 DIAGNOSIS — F33.1 MODERATE EPISODE OF RECURRENT MAJOR DEPRESSIVE DISORDER (HCC): ICD-10-CM

## 2023-05-11 DIAGNOSIS — F41.1 GENERALIZED ANXIETY DISORDER: Primary | ICD-10-CM
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
## 2023-07-11 DIAGNOSIS — F41.1 GENERALIZED ANXIETY DISORDER: ICD-10-CM

## 2023-07-11 RX ORDER — BUSPIRONE HYDROCHLORIDE 7.5 MG/1
TABLET ORAL
Qty: 180 TABLET | Refills: 1 | Status: SHIPPED | OUTPATIENT
Start: 2023-07-11

## 2023-07-17 ENCOUNTER — OFFICE VISIT (OUTPATIENT)
Dept: FAMILY MEDICINE CLINIC | Facility: HOSPITAL | Age: 38
End: 2023-07-17
Payer: COMMERCIAL

## 2023-07-17 VITALS
HEART RATE: 110 BPM | DIASTOLIC BLOOD PRESSURE: 88 MMHG | HEIGHT: 64 IN | SYSTOLIC BLOOD PRESSURE: 114 MMHG | WEIGHT: 273.6 LBS | TEMPERATURE: 98.1 F | BODY MASS INDEX: 46.71 KG/M2

## 2023-07-17 DIAGNOSIS — R74.01 TRANSAMINITIS: ICD-10-CM

## 2023-07-17 DIAGNOSIS — I10 ESSENTIAL HYPERTENSION: Primary | ICD-10-CM

## 2023-07-17 DIAGNOSIS — E66.01 MORBID OBESITY WITH BMI OF 45.0-49.9, ADULT (HCC): ICD-10-CM

## 2023-07-17 DIAGNOSIS — E03.9 HYPOTHYROIDISM, UNSPECIFIED TYPE: ICD-10-CM

## 2023-07-17 PROCEDURE — 99214 OFFICE O/P EST MOD 30 MIN: CPT | Performed by: INTERNAL MEDICINE

## 2023-07-17 RX ORDER — BISOPROLOL FUMARATE 10 MG/1
10 TABLET, FILM COATED ORAL DAILY
Qty: 30 TABLET | Refills: 5
Start: 2023-07-17 | End: 2023-07-19

## 2023-07-17 RX ORDER — LAMOTRIGINE 100 MG/1
100 TABLET ORAL DAILY
COMMUNITY
Start: 2023-06-11 | End: 2023-07-18 | Stop reason: SDUPTHER

## 2023-07-17 RX ORDER — RISPERIDONE 0.25 MG/1
TABLET ORAL
COMMUNITY
Start: 2023-06-20

## 2023-07-17 NOTE — PROGRESS NOTES
Name: Lenore Garcia      : 1985      MRN: [id number]  Encounter Provider: Maldonado Lal DO  Encounter Date: 2023   Encounter department: [address]     1. Essential hypertension  Assessment & Plan:  BP stable, con't current meds, recheck in 3 mos, healthy diet and regular exercise encouraged    Orders:  -     bisoprolol (ZEBETA) 10 MG tablet; Take 1 tablet (10 mg total) by mouth daily  -     Comprehensive metabolic panel    2. Hypothyroidism, unspecified type  Assessment & Plan:  Clinically euthyroid, TFT's at goal, con't current regimen, recheck annually      3. Transaminitis  Assessment & Plan: Will recheck in 3 mos - BW order given, healthy diet and regular exercise encouraged    Orders:  -     Comprehensive metabolic panel    4. Morbid obesity with BMI of 45.0-49.9, adult ([address])  Comments:  healthy diet and regular exercise      PAP     BW     FLP         Subjective      HPI Pt here for follow up appt    BP at goal today and meds were reviewed and no changes have occurred. She denies missing doses of meds or SE with the meds. She does not check her BP outside the office. She notes no frequent dizziness/double vision/CP. She has had an increase in her migraines recently. Pt is taking their thyroid medication daily w/o any other meds and prior to eating. They deny any significant wgt changes/fatigue/C/D/palp/hair loss or skin changes. She shakes if she gets anxious. She had BW done while IP and AST/ALT elevated. She admits diet has not been good. She just started riding her bike again. She has been doing well since her IP admission for ETOH. She was IP for 28 days. She is in IOP tx and is following with psych. She has had some med adjustments and med list removed. She is sleeping much better with Trazodone.  She still has some down mood          Review of Systems   Constitutional: Negative for chills and fever.   Eyes: Negative for pain and visual disturbance. Respiratory: Negative for cough and shortness of breath. Cardiovascular: Negative for chest pain and palpitations. Gastrointestinal: Negative for abdominal pain, constipation, diarrhea, nausea and vomiting. Genitourinary: Negative for difficulty urinating and dysuria. Musculoskeletal: Negative for back pain and neck pain. Skin: Negative for rash and wound. Neurological: Negative for dizziness, light-headedness and headaches. Hematological: Does not bruise/bleed easily. Psychiatric/Behavioral: Positive for dysphoric mood. Negative for sleep disturbance.        Current Outpatient Medications on File Prior to Visit   Medication Sig   • lamoTRIgine (LaMICtal) 100 mg tablet Take 100 mg by mouth in the morning   • busPIRone (BUSPAR) 7.5 mg tablet TAKE 1 TABLET BY MOUTH TWICE A DAY   • disulfiram (ANTABUSE) 250 mg tablet TAKE 1 TABLET BY MOUTH EVERYDAY AS NEEDED IN HIGH RISK SETTINGS   • fluticasone (FLONASE) 50 mcg/act nasal spray 1 spray into each nostril daily   • hydrOXYzine pamoate (VISTARIL) 50 mg capsule TAKE 1 CAPSULE BY MOUTH EVERY DAY AS NEEDED NO MORE THAN 6 CAPSULES IN 24 HOURS   • levothyroxine 50 mcg tablet Take 1 tablet (50 mcg total) by mouth daily   • Multiple Vitamins-Minerals (MULTIVITAMIN ADULT PO) Take 1 tablet by mouth daily   • naltrexone (REVIA) 50 mg tablet Take 50 mg by mouth daily   • risperiDONE (RisperDAL) 0.25 mg tablet TAKE 1 TABLET STARTING AT 3:00 PM THREE TIMES A DAY AT 7:00 AM, 3:00 PM, 9:30 PM   • Tranexamic Acid 650 MG TABS Take 2 tablets (1,300 mg total) by mouth 3 (three) times a day During heavy days of menstrual cycle, maximum 5 days of use per month   • traZODone (DESYREL) 100 mg tablet Take 1 tablet (100 mg total) by mouth daily at bedtime   • venlafaxine (EFFEXOR-XR) 150 mg 24 hr capsule TAKE 1 CAPSULE BY MOUTH EVERY DAY   • venlafaxine (EFFEXOR-XR) 75 mg 24 hr capsule TAKE 1 CAPSULE (75 MG) ALONG WITH 150 MG CAPSULE TO = 225 MG DAILY   • [DISCONTINUED] bisoprolol (ZEBETA) 10 MG tablet Take 1 tablet (10 mg total) by mouth daily   • [DISCONTINUED] gabapentin (NEURONTIN) 100 mg capsule TAKE 1 CAPSULE BY MOUTH TID       Objective     /88   Pulse (!) 110   Temp 98.1 °F (36.7 °C) (Tympanic)   Ht 5' 4" (1.626 m)   Wt 124 kg (273 lb 9.6 oz)   BMI 46.96 kg/m²     Physical Exam  Vitals and nursing note reviewed. Constitutional:       General: She is not in acute distress. Appearance: She is well-developed. She is obese. She is not ill-appearing. HENT:      Head: Normocephalic and atraumatic. Eyes:      General:         Right eye: No discharge. Left eye: No discharge. Conjunctiva/sclera: Conjunctivae normal.   Neck:      Trachea: No tracheal deviation. Pulmonary:      Effort: Pulmonary effort is normal. No respiratory distress. Skin:     General: Skin is warm. Coloration: Skin is not pale. Findings: No rash. Neurological:      General: No focal deficit present. Mental Status: She is alert. Motor: No abnormal muscle tone. Gait: Gait normal.   Psychiatric:         Behavior: Behavior normal.         Thought Content:  Thought content normal.         Judgment: Judgment normal.      Comments: Tearful at times       Orlando Segura DO

## 2023-07-18 ENCOUNTER — APPOINTMENT (EMERGENCY)
Dept: RADIOLOGY | Facility: HOSPITAL | Age: 38
End: 2023-07-18
Payer: COMMERCIAL

## 2023-07-18 ENCOUNTER — HOSPITAL ENCOUNTER (EMERGENCY)
Facility: HOSPITAL | Age: 38
Discharge: HOME | End: 2023-07-19
Attending: EMERGENCY MEDICINE
Payer: COMMERCIAL

## 2023-07-18 DIAGNOSIS — F33.1 MODERATE EPISODE OF RECURRENT MAJOR DEPRESSIVE DISORDER (HCC): Primary | ICD-10-CM

## 2023-07-18 DIAGNOSIS — F32.A DEPRESSION: Primary | ICD-10-CM

## 2023-07-18 DIAGNOSIS — F10.10 ALCOHOL ABUSE: ICD-10-CM

## 2023-07-18 LAB
AMPHETAMINES SERPL QL SCN: NEGATIVE
BARBITURATES UR QL: NEGATIVE
BENZODIAZ UR QL: NEGATIVE
COCAINE UR QL: NEGATIVE
ETHANOL EXG-MCNC: 0.14 MG/DL
ETHANOL EXG-MCNC: 0.21 MG/DL
EXT PREGNANCY TEST URINE: NEGATIVE
EXT. CONTROL: NORMAL
METHADONE UR QL: NEGATIVE
OPIATES UR QL SCN: NEGATIVE
OXYCODONE+OXYMORPHONE UR QL SCN: NEGATIVE
PCP UR QL: NEGATIVE
THC UR QL: POSITIVE

## 2023-07-18 PROCEDURE — 80307 DRUG TEST PRSMV CHEM ANLYZR: CPT | Performed by: EMERGENCY MEDICINE

## 2023-07-18 PROCEDURE — 82075 ASSAY OF BREATH ETHANOL: CPT | Performed by: EMERGENCY MEDICINE

## 2023-07-18 PROCEDURE — 81025 URINE PREGNANCY TEST: CPT | Performed by: EMERGENCY MEDICINE

## 2023-07-18 PROCEDURE — 72220 X-RAY EXAM SACRUM TAILBONE: CPT

## 2023-07-18 PROCEDURE — 82075 ASSAY OF BREATH ETHANOL: CPT

## 2023-07-18 RX ORDER — LAMOTRIGINE 100 MG/1
100 TABLET ORAL DAILY
Qty: 10 TABLET | Refills: 0 | Status: SHIPPED | OUTPATIENT
Start: 2023-07-18 | End: 2023-09-15

## 2023-07-18 RX ORDER — IBUPROFEN 600 MG/1
600 TABLET ORAL ONCE
Status: COMPLETED | OUTPATIENT
Start: 2023-07-18 | End: 2023-07-18

## 2023-07-18 RX ADMIN — IBUPROFEN 600 MG: 600 TABLET, FILM COATED ORAL at 18:55

## 2023-07-18 NOTE — ED PROVIDER NOTES
History  Chief Complaint   Patient presents with   • Psychiatric Evaluation     Patient says her  woke up this morning and had a gun--threatened to kill himself. Her kids are in a safe place and  is currently getting treated. Patient sarcastically said to the officers that they should be concerned there is a gun in her house. She says her mental health is 'not great' and the police brought her here. She has never had any suicide attempts. Patient recently had treatment at [address] [place] [address] (d/c on June 26th) for alcohol abuse/mental health. 29-year-old female presents for psychiatric evaluation. Patient reports she has a lot going on at home. Her  went into inpatient treatment today for suicidal ideations. She states her 2 children ages 1 and 11 were taken from her today. She is denying suicidal ideations at this time but states she suffers with depression and other psychiatric disorders. She states her medications are not working. She was brought in by police due to making statements regarding her handgun at home that her  just purchased. Denies homicidal ideations. She states she just completed a 21-day alcohol rehab but then relapsed shortly after. She fell on her steps when the police were at her house today and currently complains of tailbone pain. Denies any further trauma. Prior to Admission Medications   Prescriptions Last Dose Informant Patient Reported? Taking?    Multiple Vitamins-Minerals (MULTIVITAMIN ADULT PO)  Self Yes No   Sig: Take 1 tablet by mouth daily   Tranexamic Acid 650 MG TABS   No No   Sig: Take 2 tablets (1,300 mg total) by mouth 3 (three) times a day During heavy days of menstrual cycle, maximum 5 days of use per month   bisoprolol (ZEBETA) 10 MG tablet   No No   Sig: Take 1 tablet (10 mg total) by mouth daily   busPIRone (BUSPAR) 7.5 mg tablet   No No   Sig: TAKE 1 TABLET BY MOUTH TWICE A DAY   disulfiram (ANTABUSE) 250 mg tablet   Yes No   Sig: TAKE 1 TABLET BY MOUTH EVERYDAY AS NEEDED IN HIGH RISK SETTINGS   fluticasone (FLONASE) 50 mcg/act nasal spray   Yes No   Si spray into each nostril daily   hydrOXYzine pamoate (VISTARIL) 50 mg capsule   Yes No   Sig: TAKE 1 CAPSULE BY MOUTH EVERY DAY AS NEEDED NO MORE THAN 6 CAPSULES IN 24 HOURS   lamoTRIgine (LaMICtal) 100 mg tablet   No No   Sig: Take 1 tablet (100 mg total) by mouth in the morning   levothyroxine 50 mcg tablet   No No   Sig: Take 1 tablet (50 mcg total) by mouth daily   naltrexone (REVIA) 50 mg tablet   Yes No   Sig: Take 50 mg by mouth daily   risperiDONE (RisperDAL) 0.25 mg tablet   Yes No   Sig: TAKE 1 TABLET STARTING AT 3:00 PM THREE TIMES A DAY AT 7:00 AM, 3:00 PM, 9:30 PM   traZODone (DESYREL) 100 mg tablet   No No   Sig: Take 1 tablet (100 mg total) by mouth daily at bedtime   venlafaxine (EFFEXOR-XR) 150 mg 24 hr capsule   No No   Sig: TAKE 1 CAPSULE BY MOUTH EVERY DAY   venlafaxine (EFFEXOR-XR) 75 mg 24 hr capsule   No No   Sig: TAKE 1 CAPSULE (75 MG) ALONG WITH 150 MG CAPSULE TO = 225 MG DAILY      Facility-Administered Medications: None       Past Medical History:   Diagnosis Date   • Alcohol withdrawal delirium, acute, hyperactive ([address]) 2022   • ETOH abuse    • Insulin controlled gestational diabetes mellitus (GDM) during pregnancy, antepartum 2019    Metformin dinner/HS insulin  Last Assessment & Plan:  BG log reviewed today:- previous review on Friday with recommendation for increasing HS insulin  Fastings: high normal 1 hour PP: within the desired range   Discussed rationale and recommendation to change current medical therapy as listed:   Bedtime: 18 units Levemir  Continue same dose of Metformin, 1000 mg at dinner.   Denies signs and sympto   • Miscarriage        Past Surgical History:   Procedure Laterality Date   •  SECTION  2018   •  SECTION  10/20/2017   • DILATION AND CURETTAGE OF UTERUS     • IR LUMBAR PUNCTURE  6/3/2022   • MASTOIDECTOMY  2013   • MASTOIDECTOMY Right    • WISDOM TOOTH EXTRACTION         Family History   Problem Relation Age of Onset   • Depression Mother    • Multiple sclerosis Mother    • Thyroid disease Mother    • Diabetes Mother            • Hypertension Mother    • COPD Mother    • Colon cancer Father    • Rectal cancer Father 47   • Hyperlipidemia Father    • Hypertension Father    • Alcohol abuse Father    • Cancer Father    • Asthma Sister    • Hypertension Sister      I have reviewed and agree with the history as documented. E-Cigarette/Vaping   • E-Cigarette Use Never User      E-Cigarette/Vaping Substances   • Nicotine No    • THC No    • CBD No    • Flavoring No    • Other No    • Unknown No      Social History     Tobacco Use   • Smoking status: Former     Packs/day: 0.25     Types: Cigarettes     Quit date: 2017     Years since quittin.5   • Smokeless tobacco: Never   Vaping Use   • Vaping Use: Never used   Substance Use Topics   • Alcohol use: Not Currently     Comment: Quit 2022   • Drug use: Yes     Types: Marijuana     Comment: social       Review of Systems   Constitutional: Negative for fever. Psychiatric/Behavioral: Positive for dysphoric mood. Negative for suicidal ideas. Physical Exam  Physical Exam  Vitals and nursing note reviewed. Constitutional:       Appearance: She is well-developed. HENT:      Head: Normocephalic and atraumatic. Right Ear: External ear normal.      Left Ear: External ear normal.      Nose: Nose normal.   Eyes:      General: No scleral icterus. Cardiovascular:      Rate and Rhythm: Normal rate. Pulmonary:      Effort: Pulmonary effort is normal. No respiratory distress. Abdominal:      General: There is no distension. Tenderness: There is no abdominal tenderness. Musculoskeletal:         General: No deformity. Normal range of motion. Cervical back: Normal range of motion. Skin:     Findings: No rash. Neurological:      General: No focal deficit present. Mental Status: She is alert and oriented to person, place, and time. Psychiatric:         Mood and Affect: Mood normal.         Vital Signs  ED Triage Vitals [07/18/23 1808]   Temperature Pulse Respirations Blood Pressure SpO2   98.8 °F (37.1 °C) 104 18 137/92 99 %      Temp Source Heart Rate Source Patient Position - Orthostatic VS BP Location FiO2 (%)   Temporal Monitor Sitting Left arm --      Pain Score       No Pain           Vitals:    07/18/23 1915 07/18/23 2000 07/18/23 2100 07/19/23 0000   BP: 129/73 121/79 110/61 110/62   Pulse: 96 102 99 (!) 107   Patient Position - Orthostatic VS:             Visual Acuity      ED Medications  Medications   ibuprofen (MOTRIN) tablet 600 mg (600 mg Oral Given 7/18/23 1855)       Diagnostic Studies  Results Reviewed     Procedure Component Value Units Date/Time    POCT alcohol breath test [[id number]]  (Normal) Resulted: 07/18/23 2243    Lab Status: Final result Updated: 07/18/23 2243     EXTBreath Alcohol 0.135    Rapid drug screen, urine [[id number]]  (Abnormal) Collected: 07/18/23 1903    Lab Status: Final result Specimen: Urine, Other Updated: 07/18/23 1930     Amph/Meth UR Negative     Barbiturate Ur Negative     Benzodiazepine Urine Negative     Cocaine Urine Negative     Methadone Urine Negative     Opiate Urine Negative     PCP Ur Negative     THC Urine Positive     Oxycodone Urine Negative    Narrative:      Presumptive report. If requested, specimen will be sent to reference lab for confirmation. FOR MEDICAL PURPOSES ONLY. IF CONFIRMATION NEEDED PLEASE CONTACT THE LAB WITHIN 5 DAYS.     Drug Screen Cutoff Levels:  AMPHETAMINE/METHAMPHETAMINES  1000 ng/mL  BARBITURATES     200 ng/mL  BENZODIAZEPINES     200 ng/mL  COCAINE      300 ng/mL  METHADONE      300 ng/mL  OPIATES      300 ng/mL  PHENCYCLIDINE     25 ng/mL  THC       50 ng/mL  OXYCODONE      100 ng/mL    POCT pregnancy, urine [[id number]] (Normal) Resulted: 07/18/23 1908    Lab Status: Final result Updated: 07/18/23 1908     EXT Preg Test, Ur Negative     Control Valid    POCT alcohol breath test [[id number]]  (Normal) Resulted: 07/18/23 1812    Lab Status: Final result Updated: 07/18/23 1812     EXTBreath Alcohol 0.206                 XR sacrum and coccyx    (Results Pending)              Procedures  Procedures         ED Course                               SBIRT 22yo+    Flowsheet Row Most Recent Value   Initial Alcohol Screen: US AUDIT-C     1. How often do you have a drink containing alcohol? 5 Filed at: 07/18/2023 2010   2. How many drinks containing alcohol do you have on a typical day you are drinking? 6 Filed at: 07/18/2023 2010   3a. Male UNDER 65: How often do you have five or more drinks on one occasion? 6 Filed at: 07/18/2023 2010   3b. FEMALE Any Age, or MALE 65+: How often do you have 4 or more drinks on one occassion? 6 Filed at: 07/18/2023 2010   Audit-C Score 23 Filed at: 07/18/2023 2010   GOLDIE: How many times in the past year have you. .. Used an illegal drug or used a prescription medication for non-medical reasons? Never Filed at: 07/18/2023 2010                    Medical Decision Making  37-year-old female presenting for psychiatric evaluation. Denies suicidal ideations but states suffers from severe depression. Obtain crisis consultation. X-ray tailbone. Symptom control. Amount and/or Complexity of Data Reviewed  Labs: ordered. Radiology: ordered. Risk  Prescription drug management. Disposition  Final diagnoses:   Depression   Alcohol abuse     Time reflects when diagnosis was documented in both MDM as applicable and the Disposition within this note     Time User Action Codes Description Comment    7/18/2023  9:12 PM Carlos Ferguson [I10. A] Depression     7/18/2023  9:12 PM Carlos Ferguson [F10.10] Alcohol abuse       ED Disposition     ED Disposition   Transfer to [address] [place] [address] --    Date/Time   Tue Jul 18, 2023  9:12 PM    Comment   New Mariee should be transferred out to [facility] and has been medically cleared. Follow-up Information    None         Patient's Medications   Discharge Prescriptions    No medications on file       No discharge procedures on file.     PDMP Review       Value Time User    PDMP Reviewed  Yes 6/4/2022  6:32 PM Brent Butler MD          ED Provider  Electronically Signed by           Nimesh Villar DO  07/19/23 0021

## 2023-07-19 VITALS
HEART RATE: 96 BPM | RESPIRATION RATE: 20 BRPM | OXYGEN SATURATION: 91 % | DIASTOLIC BLOOD PRESSURE: 89 MMHG | HEIGHT: 63 IN | WEIGHT: 269 LBS | TEMPERATURE: 98.8 F | SYSTOLIC BLOOD PRESSURE: 162 MMHG | BODY MASS INDEX: 47.66 KG/M2

## 2023-07-19 DIAGNOSIS — I10 ESSENTIAL HYPERTENSION: ICD-10-CM

## 2023-07-19 LAB — ETHANOL EXG-MCNC: 0.09 MG/DL

## 2023-07-19 PROCEDURE — 82075 ASSAY OF BREATH ETHANOL: CPT

## 2023-07-19 RX ORDER — BISOPROLOL FUMARATE 10 MG/1
TABLET, FILM COATED ORAL
Qty: 30 TABLET | Refills: 5 | Status: SHIPPED | OUTPATIENT
Start: 2023-07-19

## 2023-07-19 NOTE — ED CARE HANDOFF
Emergency Department Sign Out Note        Sign out and transfer of care from Dr. Yoni Busch. See Separate Emergency Department note. The patient, George Collins, was evaluated by the previous provider for alcohol intoxication, depression. Workup Completed:  XR sacrum and coccyx    (Results Pending)      Labs Reviewed   RAPID DRUG SCREEN, URINE - Abnormal       Result Value Ref Range Status    Amph/Meth UR Negative  Negative Final    Barbiturate Ur Negative  Negative Final    Benzodiazepine Urine Negative  Negative Final    Cocaine Urine Negative  Negative Final    Methadone Urine Negative  Negative Final    Opiate Urine Negative  Negative Final    PCP Ur Negative  Negative Final    THC Urine Positive (*) Negative Final    Oxycodone Urine Negative  Negative Final    Narrative:     Presumptive report. If requested, specimen will be sent to reference lab for confirmation. FOR MEDICAL PURPOSES ONLY. IF CONFIRMATION NEEDED PLEASE CONTACT THE LAB WITHIN 5 DAYS. Drug Screen Cutoff Levels:  AMPHETAMINE/METHAMPHETAMINES  1000 ng/mL  BARBITURATES     200 ng/mL  BENZODIAZEPINES     200 ng/mL  COCAINE      300 ng/mL  METHADONE      300 ng/mL  OPIATES      300 ng/mL  PHENCYCLIDINE     25 ng/mL  THC       50 ng/mL  OXYCODONE      100 ng/mL   POCT ALCOHOL BREATH TEST - Normal    EXTBreath Alcohol 0.206   Final   POCT PREGNANCY, URINE - Normal    EXT Preg Test, Ur Negative   Final    Control Valid   Final   POCT ALCOHOL BREATH TEST - Normal    EXTBreath Alcohol 0.135   Final   POCT ALCOHOL BREATH TEST - Normal    EXTBreath Alcohol 0.089   Final        ED Course / Workup Pending (followup):                                    ED Course as of 07/19/23 0429   Wed Jul 19, 2023   0122 S/o from Dr. Yoni Busch. Depressed. No SI/HI. Police brought her in for eval, intoxicated. No [address]. Crisis to eval.    0427 Crisis eval'd pt - no SI/HI. Doesn't want to stay for admission. Will d/c home. Procedures  MDM        Disposition  Final diagnoses:   Depression   Alcohol abuse     Time reflects when diagnosis was documented in both MDM as applicable and the Disposition within this note     Time User Action Codes Description Comment    7/18/2023  9:12 PM Juan Valderrama Add [E43. A] Depression     7/18/2023  9:12 PM Juan Valderrama Add [F10.10] Alcohol abuse       ED Disposition     ED Disposition   Discharge    Condition   Stable    Date/Time   Wed Jul 19, 2023  4:28 AM    Comment   --         Follow-up Information     Follow up With Specialties Details Why Contact Info Additional [id number] Blake Zapien, DO Internal Medicine, Family Medicine Schedule an appointment as soon as possible for a visit   [address] [address] [phone] [facility] Emergency Department Emergency Medicine  If symptoms worsen [address] [facility] [address]  [address] [facility] Emergency Department, [address] [facility], [address]    [facility]  Call   Attention [facility]  [facility] [address] [facility],  [phone]           Patient's Medications   Discharge Prescriptions    No medications on file     No discharge procedures on file.        ED Provider  Electronically Signed by     Dedra Ramos MD  07/19/23 0430

## 2023-07-19 NOTE — ED NOTES
This writer discussed the patients current presentation and recommended discharge plan with Zahra Quiroga. They agree with the patient being discharged at this time. The patient was Instructed to follow up with their PCP/psychiatrist.     This writer and the patient completed a safety plan. The patient was provided with a copy of their safety plan with encouragement to utilize the plan following discharge. This writer discussed discharge plans with the patient who agrees with and understands the discharge plans. SAFETY PLAN  Warning Signs (thoughts, images, mood, behavior, situations) of a potential crisis: Relapse, irritiability      Coping Skills (what can I do to take my mind off the problem, or to keep myself safe):   Spend time w/ children      Outside Support (who can I reach out to for support and help):  Family/friends        National Suicide Prevention Hotline:  [address] [place] [address] Drive [address] [facility] [phone] - [place] Crisis/Mobile Crisis   [address]: [address] [place] [address] Avenue: [address] [place] [address] [phone] - [address] [phone] - Crisis   [phone] - Peer Support Talk Line (1-9pm daily)  [phone] - Teen Support Talk Line (1-9pm daily)  [address] [place] [address] [address] [place] [address]) [phone] - [address] [facility]

## 2023-07-19 NOTE — ED NOTES
Pt presents to ED post  being taken to [facility] and her children being taken from her and placed in the care of family. Pt reported that  made a suicidal statement and a gun was involved. Pt reportedly made an off the cuff remark to responding PD about the firearm and her "poor mental health" resulting in her being brought to the ED. Pt stated that she is irritated and confused and furthermore has no idea how PD came to be at her residence in the first place. Pt did not make any SI/SA/HI regarding the firearm to PD. Pt stated that  just got it days ago. Pt was under the influence upon admission. Pt has a substance abuse issue and has relapsed after a 21 day admission at [facility]. Pt reports consuming an unknown amount of vodka when she drinks. Pt's UDS only positive for cannabis. Pt has been to rehab 5x. Pt stated that she is currently linked w/ a psychiatrist but states that the psychiatrist will not prescribe certain medications. Pt voiced numerous complaints about wait lists. Pt reports that the medications are not really helpful. Pt denies any prior behavioral health TX, IP or OP. Pt reports that she has "crippling anxiety" and daily depression. Pt stated that she had not often left the residence from 2/23 until she went to rehab recently. Pt denied appetite/sleep complaints. Pt denied any AV hallucinations or persecutory/derogatory delusions. Pt's insight/judgment are fair/poor. Pt reports that CYF is involved due to substance abuse concerns. Pt stated that she and  have two children. Pt reports being employed. Pt denied any aggressive behavior/criminal issues. This worker asked Pt if she would be interested in [address] [place] [address] for behavioral health and she resoundingly declined. Pt was asked if there were any referrals that could be made on her behalf for PHP or OP services and Pt similarly declined. Pt stated that at this point she wished to be discharged.  Pt denied SI and made no statements to that effect to ED staff or PD. Attending physician was in agreement w/ Pt being d/c'd at this time.

## 2023-07-20 ENCOUNTER — HOSPITAL ENCOUNTER (EMERGENCY)
Facility: HOSPITAL | Age: 38
Discharge: HOME | End: 2023-07-20
Attending: EMERGENCY MEDICINE
Payer: COMMERCIAL

## 2023-07-20 VITALS
SYSTOLIC BLOOD PRESSURE: 159 MMHG | WEIGHT: 270 LBS | RESPIRATION RATE: 18 BRPM | HEIGHT: 63 IN | TEMPERATURE: 99.2 F | BODY MASS INDEX: 47.84 KG/M2 | DIASTOLIC BLOOD PRESSURE: 85 MMHG | OXYGEN SATURATION: 100 % | HEART RATE: 105 BPM

## 2023-07-20 DIAGNOSIS — F32.A DEPRESSION: Primary | ICD-10-CM

## 2023-07-20 DIAGNOSIS — F10.929 ALCOHOL INTOXICATION (HCC): ICD-10-CM

## 2023-07-20 LAB
AMPHETAMINES SERPL QL SCN: NEGATIVE
BARBITURATES UR QL: NEGATIVE
BENZODIAZ UR QL: NEGATIVE
COCAINE UR QL: NEGATIVE
ETHANOL EXG-MCNC: 0.04 MG/DL
ETHANOL EXG-MCNC: 0.15 MG/DL
ETHANOL EXG-MCNC: 0.26 MG/DL
EXT PREGNANCY TEST URINE: NEGATIVE
EXT. CONTROL: NORMAL
METHADONE UR QL: NEGATIVE
OPIATES UR QL SCN: NEGATIVE
OXYCODONE+OXYMORPHONE UR QL SCN: NEGATIVE
PCP UR QL: NEGATIVE
THC UR QL: POSITIVE

## 2023-07-20 PROCEDURE — 82075 ASSAY OF BREATH ETHANOL: CPT | Performed by: EMERGENCY MEDICINE

## 2023-07-20 PROCEDURE — 81025 URINE PREGNANCY TEST: CPT | Performed by: EMERGENCY MEDICINE

## 2023-07-20 PROCEDURE — 80307 DRUG TEST PRSMV CHEM ANLYZR: CPT | Performed by: EMERGENCY MEDICINE

## 2023-07-20 PROCEDURE — 99284 EMERGENCY DEPT VISIT MOD MDM: CPT | Performed by: EMERGENCY MEDICINE

## 2023-07-20 PROCEDURE — 99284 EMERGENCY DEPT VISIT MOD MDM: CPT

## 2023-07-20 PROCEDURE — 93005 ELECTROCARDIOGRAM TRACING: CPT

## 2023-07-20 RX ORDER — FOLIC ACID 1 MG/1
1 TABLET ORAL DAILY
Status: DISCONTINUED | OUTPATIENT
Start: 2023-07-20 | End: 2023-07-20 | Stop reason: HOSPADM

## 2023-07-20 RX ORDER — LEVOTHYROXINE SODIUM 0.05 MG/1
50 TABLET ORAL
Status: DISCONTINUED | OUTPATIENT
Start: 2023-07-21 | End: 2023-07-20 | Stop reason: HOSPADM

## 2023-07-20 RX ORDER — LANOLIN ALCOHOL/MO/W.PET/CERES
100 CREAM (GRAM) TOPICAL DAILY
Status: DISCONTINUED | OUTPATIENT
Start: 2023-07-20 | End: 2023-07-20 | Stop reason: HOSPADM

## 2023-07-20 RX ORDER — RISPERIDONE 0.25 MG/1
0.25 TABLET ORAL 3 TIMES DAILY
Status: DISCONTINUED | OUTPATIENT
Start: 2023-07-20 | End: 2023-07-20 | Stop reason: HOSPADM

## 2023-07-20 RX ORDER — ONDANSETRON 4 MG/1
4 TABLET, ORALLY DISINTEGRATING ORAL ONCE
Status: COMPLETED | OUTPATIENT
Start: 2023-07-20 | End: 2023-07-20

## 2023-07-20 RX ORDER — LORAZEPAM 1 MG/1
1 TABLET ORAL ONCE
Status: COMPLETED | OUTPATIENT
Start: 2023-07-20 | End: 2023-07-20

## 2023-07-20 RX ORDER — NICOTINE 21 MG/24HR
21 PATCH, TRANSDERMAL 24 HOURS TRANSDERMAL ONCE
Status: DISCONTINUED | OUTPATIENT
Start: 2023-07-20 | End: 2023-07-20 | Stop reason: HOSPADM

## 2023-07-20 RX ORDER — VENLAFAXINE HYDROCHLORIDE 37.5 MG/1
37.5 CAPSULE, EXTENDED RELEASE ORAL DAILY
Status: DISCONTINUED | OUTPATIENT
Start: 2023-07-20 | End: 2023-07-20

## 2023-07-20 RX ORDER — LAMOTRIGINE 100 MG/1
100 TABLET ORAL DAILY
Status: DISCONTINUED | OUTPATIENT
Start: 2023-07-21 | End: 2023-07-20 | Stop reason: HOSPADM

## 2023-07-20 RX ORDER — TRAZODONE HYDROCHLORIDE 50 MG/1
100 TABLET ORAL
Status: DISCONTINUED | OUTPATIENT
Start: 2023-07-20 | End: 2023-07-20 | Stop reason: HOSPADM

## 2023-07-20 RX ORDER — LORAZEPAM 1 MG/1
2 TABLET ORAL ONCE
Status: COMPLETED | OUTPATIENT
Start: 2023-07-20 | End: 2023-07-20

## 2023-07-20 RX ORDER — ONDANSETRON 2 MG/ML
4 INJECTION INTRAMUSCULAR; INTRAVENOUS ONCE
Status: DISCONTINUED | OUTPATIENT
Start: 2023-07-20 | End: 2023-07-20

## 2023-07-20 RX ADMIN — LORAZEPAM 1 MG: 1 TABLET ORAL at 14:47

## 2023-07-20 RX ADMIN — ONDANSETRON 4 MG: 4 TABLET, ORALLY DISINTEGRATING ORAL at 14:42

## 2023-07-20 RX ADMIN — THIAMINE HCL TAB 100 MG 100 MG: 100 TAB at 16:03

## 2023-07-20 RX ADMIN — NICOTINE 21 MG: 21 PATCH, EXTENDED RELEASE TRANSDERMAL at 16:02

## 2023-07-20 RX ADMIN — MULTIPLE VITAMINS W/ MINERALS TAB 1 TABLET: TAB ORAL at 16:03

## 2023-07-20 RX ADMIN — LORAZEPAM 2 MG: 1 TABLET ORAL at 16:55

## 2023-07-20 RX ADMIN — RISPERIDONE 0.25 MG: 0.25 TABLET ORAL at 16:55

## 2023-07-20 RX ADMIN — FOLIC ACID 1 MG: 1 TABLET ORAL at 16:03

## 2023-07-20 NOTE — ED PROVIDER NOTES
History  Chief Complaint   Patient presents with   • Alcohol Intoxication     Patient presents to the ED via EMS, states patient made a threat of wanting to kill herself to [facility] prior to going to rehab because her children were taken away and she will not get them back for one year. States last drank at noon today, states less than a third of a bottle of vodka. This is a 37-year-old female who presents via ambulance with alcohol  intoxication and thoughts to harm herself without specific plan after she was told that she would lose her children today. Her  just went into an alcohol rehab facility today and patient just got out of a alcohol rehab facility on June 21 she started drinking approximately 2 weeks later. Also has a history of depression followed at the [facility] has not been on her medication for several days. History provided by:  Patient  Medical Problem  Location:  Generalized  Quality:  Anxiety and depression  Severity:  Severe  Onset quality:  Unable to specify  Timing:  Constant  Progression:  Waxing and waning  Chronicity:  Chronic  Context:  Anxiety depression and alcohol abuse  Worsened by:  Losing children to children and youth      Prior to Admission Medications   Prescriptions Last Dose Informant Patient Reported? Taking?    Multiple Vitamins-Minerals (MULTIVITAMIN ADULT PO)  Self Yes No   Sig: Take 1 tablet by mouth daily   Tranexamic Acid 650 MG TABS   No No   Sig: Take 2 tablets (1,300 mg total) by mouth 3 (three) times a day During heavy days of menstrual cycle, maximum 5 days of use per month   bisoprolol (ZEBETA) 10 MG tablet   No No   Sig: TAKE 1 TABLET (10 MG) ALONG WITH A 5MG TABLET TO = 15 MG DAILY   busPIRone (BUSPAR) 7.5 mg tablet   No No   Sig: TAKE 1 TABLET BY MOUTH TWICE A DAY   disulfiram (ANTABUSE) 250 mg tablet   Yes No   Sig: TAKE 1 TABLET BY MOUTH EVERYDAY AS NEEDED IN HIGH RISK SETTINGS   fluticasone (FLONASE) 50 mcg/act nasal spray   Yes No   Si spray into each nostril daily   hydrOXYzine pamoate (VISTARIL) 50 mg capsule   Yes No   Sig: TAKE 1 CAPSULE BY MOUTH EVERY DAY AS NEEDED NO MORE THAN 6 CAPSULES IN 24 HOURS   lamoTRIgine (LaMICtal) 100 mg tablet   No No   Sig: Take 1 tablet (100 mg total) by mouth in the morning   levothyroxine 50 mcg tablet   No No   Sig: Take 1 tablet (50 mcg total) by mouth daily   naltrexone (REVIA) 50 mg tablet   Yes No   Sig: Take 50 mg by mouth daily   risperiDONE (RisperDAL) 0.25 mg tablet   Yes No   Sig: TAKE 1 TABLET STARTING AT 3:00 PM THREE TIMES A DAY AT 7:00 AM, 3:00 PM, 9:30 PM   traZODone (DESYREL) 100 mg tablet   No No   Sig: Take 1 tablet (100 mg total) by mouth daily at bedtime   venlafaxine (EFFEXOR-XR) 150 mg 24 hr capsule   No No   Sig: TAKE 1 CAPSULE BY MOUTH EVERY DAY   venlafaxine (EFFEXOR-XR) 75 mg 24 hr capsule   No No   Sig: TAKE 1 CAPSULE (75 MG) ALONG WITH 150 MG CAPSULE TO = 225 MG DAILY      Facility-Administered Medications: None       Past Medical History:   Diagnosis Date   • Alcohol withdrawal delirium, acute, hyperactive ([address]) 2022   • ETOH abuse    • Insulin controlled gestational diabetes mellitus (GDM) during pregnancy, antepartum 2019    Metformin dinner/HS insulin  Last Assessment & Plan:  BG log reviewed today:- previous review on Friday with recommendation for increasing HS insulin  Fastings: high normal 1 hour PP: within the desired range   Discussed rationale and recommendation to change current medical therapy as listed:   Bedtime: 18 units Levemir  Continue same dose of Metformin, 1000 mg at dinner.   Denies signs and sympto   • Miscarriage        Past Surgical History:   Procedure Laterality Date   •  SECTION  2018   •  SECTION  10/20/2017   • DILATION AND CURETTAGE OF UTERUS     • IR LUMBAR PUNCTURE  6/3/2022   • MASTOIDECTOMY  2013   • MASTOIDECTOMY Right    • WISDOM TOOTH EXTRACTION         Family History   Problem Relation Age of Onset • Depression Mother    • Multiple sclerosis Mother    • Thyroid disease Mother    • Diabetes Mother            • Hypertension Mother    • COPD Mother    • Colon cancer Father    • Rectal cancer Father 47   • Hyperlipidemia Father    • Hypertension Father    • Alcohol abuse Father    • Cancer Father    • Asthma Sister    • Hypertension Sister      I have reviewed and agree with the history as documented. E-Cigarette/Vaping   • E-Cigarette Use Never User      E-Cigarette/Vaping Substances   • Nicotine No    • THC No    • CBD No    • Flavoring No    • Other No    • Unknown No      Social History     Tobacco Use   • Smoking status: Every Day     Packs/day: 0.50     Types: Cigarettes     Last attempt to quit: 2017     Years since quittin.5   • Smokeless tobacco: Never   Vaping Use   • Vaping Use: Never used   Substance Use Topics   • Alcohol use: Not Currently     Comment: Quit 2022   • Drug use: Yes     Types: Marijuana     Comment: social       Review of Systems   Psychiatric/Behavioral: Positive for agitation, dysphoric mood and sleep disturbance. The patient is nervous/anxious. No current specific plans to harm self   All other systems reviewed and are negative. Physical Exam  Physical Exam  Vitals and nursing note reviewed. Constitutional:       Comments: Depressed and tearful with alcohol and breath   HENT:      Head: Normocephalic and atraumatic. Right Ear: External ear normal.      Left Ear: External ear normal.   Eyes:      General: No scleral icterus. Right eye: No discharge. Left eye: No discharge. Comments: Pupils 3 to 4 mm bilateral and sluggish   Cardiovascular:      Rate and Rhythm: Regular rhythm. Tachycardia present. Pulses: Normal pulses. Heart sounds: No murmur heard. No friction rub. No gallop. Pulmonary:      Effort: Pulmonary effort is normal. No respiratory distress. Breath sounds: No stridor.  No wheezing, rhonchi or rales. Abdominal:      General: There is no distension. Palpations: Abdomen is soft. Tenderness: There is no abdominal tenderness. There is no guarding or rebound. Musculoskeletal:         General: No swelling, tenderness, deformity or signs of injury. Normal range of motion. Cervical back: Normal range of motion and neck supple. No rigidity or tenderness. Right lower leg: No edema. Left lower leg: No edema. Skin:     General: Skin is warm and dry. Findings: No erythema or rash. Neurological:      General: No focal deficit present. Mental Status: She is oriented to person, place, and time. Cranial Nerves: No cranial nerve deficit. Sensory: No sensory deficit.    Psychiatric:      Comments: Depressed and tearful         Vital Signs  ED Triage Vitals   Temperature Pulse Respirations Blood Pressure SpO2   07/20/23 1251 07/20/23 1244 07/20/23 1244 07/20/23 1244 07/20/23 1244   99.2 °F (37.3 °C) (!) 129 18 (!) 191/109 95 %      Temp Source Heart Rate Source Patient Position - Orthostatic VS BP Location FiO2 (%)   07/20/23 1251 07/20/23 1244 07/20/23 1244 07/20/23 1244 --   Oral Monitor Sitting Right arm       Pain Score       07/20/23 1244       No Pain           Vitals:    07/20/23 1700 07/20/23 1755 07/20/23 1830 07/20/23 2109   BP:   (!) 162/112 159/85   Pulse: (!) 122 (!) 115 105 105   Patient Position - Orthostatic VS:   Lying          Visual Acuity      ED Medications  Medications   ondansetron (ZOFRAN-ODT) dispersible tablet 4 mg (4 mg Oral Given 7/20/23 1442)   LORazepam (ATIVAN) tablet 1 mg (1 mg Oral Given 7/20/23 1447)   LORazepam (ATIVAN) tablet 2 mg (2 mg Oral Given 7/20/23 1655)       Diagnostic Studies  Results Reviewed     Procedure Component Value Units Date/Time    POCT alcohol breath test [[id number]]  (Normal) Resulted: 07/20/23 2054    Lab Status: Final result Updated: 07/20/23 2054     EXTBreath Alcohol 0.037    POCT alcohol breath test [[id number]]  (Normal) Resulted: 07/20/23 1649    Lab Status: Final result Updated: 07/20/23 1649     EXTBreath Alcohol 0.149    Rapid drug screen, urine [[id number]]  (Abnormal) Collected: 07/20/23 1308    Lab Status: Final result Specimen: Urine, Clean Catch Updated: 07/20/23 1326     Amph/Meth UR Negative     Barbiturate Ur Negative     Benzodiazepine Urine Negative     Cocaine Urine Negative     Methadone Urine Negative     Opiate Urine Negative     PCP Ur Negative     THC Urine Positive     Oxycodone Urine Negative    Narrative:      Presumptive report. If requested, specimen will be sent to reference lab for confirmation. FOR MEDICAL PURPOSES ONLY. IF CONFIRMATION NEEDED PLEASE CONTACT THE LAB WITHIN 5 DAYS.     Drug Screen Cutoff Levels:  AMPHETAMINE/METHAMPHETAMINES  1000 ng/mL  BARBITURATES     200 ng/mL  BENZODIAZEPINES     200 ng/mL  COCAINE      300 ng/mL  METHADONE      300 ng/mL  OPIATES      300 ng/mL  PHENCYCLIDINE     25 ng/mL  THC       50 ng/mL  OXYCODONE      100 ng/mL    POCT pregnancy, urine [[id number]]  (Normal) Resulted: 07/20/23 1308    Lab Status: Final result Specimen: Urine Updated: 07/20/23 1310     EXT Preg Test, Ur Negative     Control Valid    POCT alcohol breath test [[id number]]  (Normal) Resulted: 07/20/23 1308    Lab Status: Final result Updated: 07/20/23 1308     EXTBreath Alcohol 0.259                 No orders to display              Procedures  ECG 12 Lead Documentation Only    Date/Time: 7/20/2023 1:13 PM    Performed by: Grady Daugherty DO  Authorized by: Grady Daugherty DO    ECG reviewed by me, the ED Provider: yes    Patient location:  ED  Rate:     ECG rate:  132    ECG rate assessment: tachycardic    Rhythm:     Rhythm: sinus rhythm    Conduction:     Conduction: normal    T waves:     T waves: normal               ED Course  ED Course as of 07/21/23 1548   Thu Jul 20, 2023   1440 Patient's alcohol level is over 200 she stating that she wants to leave I informed her that she is not medically cleared   1854 Patient spoke with JOSE campos and declines any placement or interventions at this time she is waiting till her alcohol level comes down and then will speak with the crisis person                               SBIRT 22yo+    Flowsheet Row Most Recent Value   Initial Alcohol Screen: US AUDIT-C     1. How often do you have a drink containing alcohol? 6 Filed at: 07/20/2023 1250   2. How many drinks containing alcohol do you have on a typical day you are drinking? 6 Filed at: 07/20/2023 1250   3a. Male UNDER 65: How often do you have five or more drinks on one occasion? 6 Filed at: 07/20/2023 1250   3b. FEMALE Any Age, or MALE 65+: How often do you have 4 or more drinks on one occassion? 6 Filed at: 07/20/2023 1250   Audit-C Score 24 Filed at: 07/20/2023 1250                    Medical Decision Making  Depression and alcohol intoxication we will consult [facility]    Amount and/or Complexity of Data Reviewed  Labs: ordered. Disposition  Final diagnoses:   Depression   Alcohol intoxication ([address])     Time reflects when diagnosis was documented in both MDM as applicable and the Disposition within this note     Time User Action Codes Description Comment    7/20/2023  1:19 PM Riddhi Novoa Add [F32. A] Depression     7/20/2023  1:19 PM Riddhi Novoa Add [F10.929] Alcohol intoxication [facility])       ED Disposition     ED Disposition   Discharge    Condition   Stable    Date/Time   Thu Jul 20, 2023  9:09 PM    Comment   Lynda Jacobsen discharge to home/self care.                MD Jhonny Johnson Most Recent Value   Sending MD Dr Khari Lima, DO      Follow-up Information     Follow up With Specialties Details Why Contact Info Additional [id number]apien, DO Internal Medicine, Family Medicine   [address] [address] [phone] [facility] Emergency Department Emergency Medicine  If symptoms worsen [id number] [facility] [id number]  [address] [facility] Emergency Department, [address] [address], [place], [address]          Discharge Medication List as of 7/20/2023  9:09 PM      CONTINUE these medications which have NOT CHANGED    Details   bisoprolol (ZEBETA) 10 MG tablet TAKE 1 TABLET (10 MG) ALONG WITH A 5MG TABLET TO = 15 MG DAILY, Normal      busPIRone (BUSPAR) 7.5 mg tablet TAKE 1 TABLET BY MOUTH TWICE A DAY, Normal      disulfiram (ANTABUSE) 250 mg tablet TAKE 1 TABLET BY MOUTH EVERYDAY AS NEEDED IN HIGH RISK SETTINGS, Historical Med      fluticasone (FLONASE) 50 mcg/act nasal spray 1 spray into each nostril daily, Historical Med      hydrOXYzine pamoate (VISTARIL) 50 mg capsule TAKE 1 CAPSULE BY MOUTH EVERY DAY AS NEEDED NO MORE THAN 6 CAPSULES IN 24 HOURS, Historical Med      lamoTRIgine (LaMICtal) 100 mg tablet Take 1 tablet (100 mg total) by mouth in the morning, Starting Tue 7/18/2023, Normal      levothyroxine 50 mcg tablet Take 1 tablet (50 mcg total) by mouth daily, Starting Tue 9/13/2022, Normal      Multiple Vitamins-Minerals (MULTIVITAMIN ADULT PO) Take 1 tablet by mouth daily, Historical Med      naltrexone (REVIA) 50 mg tablet Take 50 mg by mouth daily, Starting Fri 7/15/2022, Historical Med      risperiDONE (RisperDAL) 0.25 mg tablet TAKE 1 TABLET STARTING AT 3:00 PM THREE TIMES A DAY AT 7:00 AM, 3:00 PM, 9:30 PM, Historical Med      Tranexamic Acid 650 MG TABS Take 2 tablets (1,300 mg total) by mouth 3 (three) times a day During heavy days of menstrual cycle, maximum 5 days of use per month, Starting Tue 8/16/2022, Normal      traZODone (DESYREL) 100 mg tablet Take 1 tablet (100 mg total) by mouth daily at bedtime, Starting u 2/2/2023, Normal      !! venlafaxine (EFFEXOR-XR) 150 mg 24 hr capsule TAKE 1 CAPSULE BY MOUTH EVERY DAY, Normal      !! venlafaxine (EFFEXOR-XR) 75 mg 24 hr capsule TAKE 1 CAPSULE (75 MG) ALONG WITH 150 MG CAPSULE TO = 225 MG DAILY, Normal       !! - Potential duplicate medications found. Please discuss with provider. No discharge procedures on file.     PDMP Review       Value Time User    PDMP Reviewed  Yes 6/4/2022  6:32 PM Star Ramírez MD          ED Provider  Electronically Signed by           Danielle Pickens DO  07/21/23 1540

## 2023-07-21 ENCOUNTER — HOSPITAL ENCOUNTER (EMERGENCY)
Facility: HOSPITAL | Age: 38
End: 2023-07-21
Attending: EMERGENCY MEDICINE
Payer: COMMERCIAL

## 2023-07-21 VITALS
SYSTOLIC BLOOD PRESSURE: 135 MMHG | TEMPERATURE: 98.5 F | HEART RATE: 98 BPM | HEIGHT: 63 IN | RESPIRATION RATE: 22 BRPM | OXYGEN SATURATION: 95 % | DIASTOLIC BLOOD PRESSURE: 75 MMHG | BODY MASS INDEX: 47.84 KG/M2 | WEIGHT: 270 LBS

## 2023-07-21 DIAGNOSIS — F32.A DEPRESSION: ICD-10-CM

## 2023-07-21 DIAGNOSIS — F10.10 ALCOHOL ABUSE: Primary | ICD-10-CM

## 2023-07-21 LAB
AMPHETAMINES SERPL QL SCN: NEGATIVE
ATRIAL RATE: 122 BPM
ATRIAL RATE: 132 BPM
BARBITURATES UR QL: NEGATIVE
BENZODIAZ UR QL: NEGATIVE
COCAINE UR QL: NEGATIVE
ETHANOL EXG-MCNC: 0.28 MG/DL
METHADONE UR QL: NEGATIVE
OPIATES UR QL SCN: NEGATIVE
OXYCODONE+OXYMORPHONE UR QL SCN: NEGATIVE
P AXIS: 79 DEGREES
P AXIS: 85 DEGREES
PCP UR QL: NEGATIVE
PR INTERVAL: 138 MS
PR INTERVAL: 138 MS
QRS AXIS: 92 DEGREES
QRS AXIS: 92 DEGREES
QRSD INTERVAL: 78 MS
QRSD INTERVAL: 82 MS
QT INTERVAL: 300 MS
QT INTERVAL: 320 MS
QTC INTERVAL: 444 MS
QTC INTERVAL: 456 MS
T WAVE AXIS: 49 DEGREES
T WAVE AXIS: 79 DEGREES
THC UR QL: POSITIVE
VENTRICULAR RATE: 122 BPM
VENTRICULAR RATE: 132 BPM

## 2023-07-21 PROCEDURE — 82075 ASSAY OF BREATH ETHANOL: CPT

## 2023-07-21 PROCEDURE — 93010 ELECTROCARDIOGRAM REPORT: CPT | Performed by: INTERNAL MEDICINE

## 2023-07-21 PROCEDURE — 99284 EMERGENCY DEPT VISIT MOD MDM: CPT | Performed by: EMERGENCY MEDICINE

## 2023-07-21 PROCEDURE — 80307 DRUG TEST PRSMV CHEM ANLYZR: CPT

## 2023-07-21 PROCEDURE — 99284 EMERGENCY DEPT VISIT MOD MDM: CPT

## 2023-07-21 PROCEDURE — 93005 ELECTROCARDIOGRAM TRACING: CPT

## 2023-07-21 RX ORDER — ONDANSETRON 4 MG/1
4 TABLET, ORALLY DISINTEGRATING ORAL ONCE
Status: COMPLETED | OUTPATIENT
Start: 2023-07-21 | End: 2023-07-21

## 2023-07-21 RX ADMIN — ONDANSETRON 4 MG: 4 TABLET, ORALLY DISINTEGRATING ORAL at 17:50

## 2023-07-21 NOTE — ED NOTES
41 y.o female presented with alcohol intoxication and Suicidal Ideations. Pt stated she met with her Therapist today and was angry due to her children being taken away for 20 days due to her alcohol issues and her  currently in detox. Pt stated she said she wanted to kill herself but had no plan. Pt stated she was just agitated and just made the comment out of anger and her Therapist recommended her to come to the ED for an evaluation. Pt stated she has no current SI pans or thoughts. Pt stated she has anxiety and depression. Pt denied any HI and A/V hallucinations. Pt recently just got out of a [facility] rehab facility on 6/2023. Pt relapsed after her 21 day stay at rehab. Pt has been in rehab 5 times. Pt was seen by TEE. Pt currently has an outpatient Psychiatrist and Therapist with [facility] and see's the Therapist every 2 weeks. Pt denies any Legal issues. Pt currently has substance abuse issues with alcohol.   Pt denies  SI and will be discharged to follow up with her Outpatient Psychiatrist and Therapist.

## 2023-07-21 NOTE — ED PROVIDER NOTES
History  Chief Complaint   Patient presents with   • Alcohol Intoxication     Patient presents to the ED with c/o desire to go to detox for alcohol, patient here for the past two days and left both times before going to detox for alcohol. 65-year-old female presents to the ED via EMS for psych evaluation, requesting rehab for alcohol. Per chart review this is the third time in 3 days patient is presenting with the same complaint. Patient states that she has been having increasing depression as her  is currently in rehab and she does not have custody of her children. She denies any SI, HI, or thoughts to harm herself or others; is not interested in inpatient psych, wants rehab for alcohol, possible dual diagnosis facility. She reports that she recently completed rehab but relapsed shortly after and reports she began drinking alcohol again a few weeks ago. She reports that she drank half a bottle of vodka yesterday before coming to the ED and reports she drank the other half of the bottle of vodka earlier today. Denies any previous withdrawal seizures. Patient is tearful on initial evaluation, keeps stating that she wants to go to rehab in [place]. She denies any other acute concerns or complaints at this time. Prior to Admission Medications   Prescriptions Last Dose Informant Patient Reported? Taking?    Multiple Vitamins-Minerals (MULTIVITAMIN ADULT PO)  Self Yes No   Sig: Take 1 tablet by mouth daily   Tranexamic Acid 650 MG TABS   No No   Sig: Take 2 tablets (1,300 mg total) by mouth 3 (three) times a day During heavy days of menstrual cycle, maximum 5 days of use per month   bisoprolol (ZEBETA) 10 MG tablet   No No   Sig: TAKE 1 TABLET (10 MG) ALONG WITH A 5MG TABLET TO = 15 MG DAILY   busPIRone (BUSPAR) 7.5 mg tablet   No No   Sig: TAKE 1 TABLET BY MOUTH TWICE A DAY   disulfiram (ANTABUSE) 250 mg tablet   Yes No   Sig: TAKE 1 TABLET BY MOUTH EVERYDAY AS NEEDED IN HIGH RISK SETTINGS   fluticasone (FLONASE) 50 mcg/act nasal spray   Yes No   Si spray into each nostril daily   hydrOXYzine pamoate (VISTARIL) 50 mg capsule   Yes No   Sig: TAKE 1 CAPSULE BY MOUTH EVERY DAY AS NEEDED NO MORE THAN 6 CAPSULES IN 24 HOURS   lamoTRIgine (LaMICtal) 100 mg tablet   No No   Sig: Take 1 tablet (100 mg total) by mouth in the morning   levothyroxine 50 mcg tablet   No No   Sig: Take 1 tablet (50 mcg total) by mouth daily   naltrexone (REVIA) 50 mg tablet   Yes No   Sig: Take 50 mg by mouth daily   risperiDONE (RisperDAL) 0.25 mg tablet   Yes No   Sig: TAKE 1 TABLET STARTING AT 3:00 PM THREE TIMES A DAY AT 7:00 AM, 3:00 PM, 9:30 PM   traZODone (DESYREL) 100 mg tablet   No No   Sig: Take 1 tablet (100 mg total) by mouth daily at bedtime   venlafaxine (EFFEXOR-XR) 150 mg 24 hr capsule   No No   Sig: TAKE 1 CAPSULE BY MOUTH EVERY DAY   venlafaxine (EFFEXOR-XR) 75 mg 24 hr capsule   No No   Sig: TAKE 1 CAPSULE (75 MG) ALONG WITH 150 MG CAPSULE TO = 225 MG DAILY      Facility-Administered Medications: None       Past Medical History:   Diagnosis Date   • Alcohol withdrawal delirium, acute, hyperactive ([address]) 2022   • ETOH abuse    • Insulin controlled gestational diabetes mellitus (GDM) during pregnancy, antepartum 2019    Metformin dinner/HS insulin  Last Assessment & Plan:  BG log reviewed today:- previous review on Friday with recommendation for increasing HS insulin  Fastings: high normal 1 hour PP: within the desired range   Discussed rationale and recommendation to change current medical therapy as listed:   Bedtime: 18 units Levemir  Continue same dose of Metformin, 1000 mg at dinner.   Denies signs and sympto   • Miscarriage        Past Surgical History:   Procedure Laterality Date   •  SECTION  2018   •  SECTION  10/20/2017   • DILATION AND CURETTAGE OF UTERUS     • IR LUMBAR PUNCTURE  6/3/2022   • MASTOIDECTOMY  2013   • MASTOIDECTOMY Right    • WISDOM TOOTH EXTRACTION         Family History   Problem Relation Age of Onset   • Depression Mother    • Multiple sclerosis Mother    • Thyroid disease Mother    • Diabetes Mother            • Hypertension Mother    • COPD Mother    • Colon cancer Father    • Rectal cancer Father 47   • Hyperlipidemia Father    • Hypertension Father    • Alcohol abuse Father    • Cancer Father    • Asthma Sister    • Hypertension Sister      I have reviewed and agree with the history as documented. E-Cigarette/Vaping   • E-Cigarette Use Never User      E-Cigarette/Vaping Substances   • Nicotine No    • THC No    • CBD No    • Flavoring No    • Other No    • Unknown No      Social History     Tobacco Use   • Smoking status: Every Day     Packs/day: 0.50     Types: Cigarettes     Last attempt to quit: 2017     Years since quittin.5   • Smokeless tobacco: Never   Vaping Use   • Vaping Use: Never used   Substance Use Topics   • Alcohol use: Not Currently     Comment: Quit 2022   • Drug use: Yes     Types: Marijuana     Comment: social       Review of Systems   Psychiatric/Behavioral: Negative for hallucinations, self-injury and suicidal ideas. The patient is nervous/anxious. Physical Exam  Physical Exam  Vitals and nursing note reviewed. Constitutional:       General: She is not in acute distress. Appearance: She is well-developed. HENT:      Head: Normocephalic and atraumatic. Eyes:      Conjunctiva/sclera: Conjunctivae normal.   Cardiovascular:      Rate and Rhythm: Normal rate and regular rhythm. Heart sounds: No murmur heard. Pulmonary:      Effort: Pulmonary effort is normal. No respiratory distress. Breath sounds: Normal breath sounds. Abdominal:      Palpations: Abdomen is soft. Tenderness: There is no abdominal tenderness. Musculoskeletal:         General: No swelling. Cervical back: Normal range of motion and neck supple. Skin:     General: Skin is warm and dry. Capillary Refill: Capillary refill takes less than 2 seconds. Neurological:      General: No focal deficit present. Mental Status: She is alert and oriented to person, place, and time. Psychiatric:         Mood and Affect: Mood normal.         Vital Signs  ED Triage Vitals   Temperature Pulse Respirations Blood Pressure SpO2   07/21/23 1459 07/21/23 1453 07/21/23 1453 07/21/23 1453 07/21/23 1453   98.5 °F (36.9 °C) (!) 121 18 135/89 95 %      Temp Source Heart Rate Source Patient Position - Orthostatic VS BP Location FiO2 (%)   07/21/23 1459 07/21/23 1453 07/21/23 1830 07/21/23 1830 --   Oral Monitor Lying Right arm       Pain Score       07/21/23 1452       No Pain           Vitals:    07/21/23 1600 07/21/23 1727 07/21/23 1730 07/21/23 1830   BP:  145/90 145/90 135/75   Pulse: (!) 116 (!) 125 (!) 122 98   Patient Position - Orthostatic VS:    Lying         Visual Acuity      ED Medications  Medications   ondansetron (ZOFRAN-ODT) dispersible tablet 4 mg (4 mg Oral Given 7/21/23 1750)       Diagnostic Studies  Results Reviewed     Procedure Component Value Units Date/Time    Rapid drug screen, urine [[id number]]  (Abnormal) Collected: 07/21/23 1728    Lab Status: Final result Specimen: Urine, Clean Catch Updated: 07/21/23 1746     Amph/Meth UR Negative     Barbiturate Ur Negative     Benzodiazepine Urine Negative     Cocaine Urine Negative     Methadone Urine Negative     Opiate Urine Negative     PCP Ur Negative     THC Urine Positive     Oxycodone Urine Negative    Narrative:      Presumptive report. If requested, specimen will be sent to reference lab for confirmation. FOR MEDICAL PURPOSES ONLY. IF CONFIRMATION NEEDED PLEASE CONTACT THE LAB WITHIN 5 DAYS.     Drug Screen Cutoff Levels:  AMPHETAMINE/METHAMPHETAMINES  1000 ng/mL  BARBITURATES     200 ng/mL  BENZODIAZEPINES     200 ng/mL  COCAINE      300 ng/mL  METHADONE      300 ng/mL  OPIATES      300 ng/mL  PHENCYCLIDINE     25 ng/mL  THC       50 ng/mL  OXYCODONE      100 ng/mL    POCT alcohol breath test [[id number]]  (Normal) Resulted: 07/21/23 1457    Lab Status: Final result Updated: 07/21/23 1457     EXTBreath Alcohol 0.275    POCT pregnancy, urine [[id number]]     Lab Status: No result Specimen: Urine                  No orders to display              Procedures  ECG 12 Lead Documentation Only    Date/Time: 7/21/2023 3:33 PM    Performed by: Johann Truong PA-C  Authorized by: Johann Truong PA-C    Rate:     ECG rate:  122    ECG rate assessment: tachycardic    Rhythm:     Rhythm: sinus tachycardia    Ectopy:     Ectopy: none    QRS:     QRS axis:  Normal  Conduction:     Conduction: normal    T waves:     T waves: normal    Other findings:     Other findings: ERASTO               ED Course  ED Course as of 07/21/23 1909 Fri Jul 21, 2023 1748 Edilma Garcia currently with patient. States they have found placement for patient for rehab and patient will have a ride from the ED.   1840 7 PM pickup time                               SBIRT 20yo+    Flowsheet Row Most Recent Value   Initial Alcohol Screen: US AUDIT-C     1. How often do you have a drink containing alcohol? 0 Filed at: 07/21/2023 1453   2. How many drinks containing alcohol do you have on a typical day you are drinking? 0 Filed at: 07/21/2023 1453   3a. Male UNDER 65: How often do you have five or more drinks on one occasion? 0 Filed at: 07/21/2023 1453   3b. FEMALE Any Age, or MALE 65+: How often do you have 4 or more drinks on one occassion? 0 Filed at: 07/21/2023 1453   Audit-C Score 0 Filed at: 07/21/2023 1453   GOLDIE: How many times in the past year have you. .. Used an illegal drug or used a prescription medication for non-medical reasons? Never Filed at: 07/21/2023 1453                    Medical Decision Making  29-year-old female presents to the ED for the third time in 3 days requesting rehab for alcohol. Does report increased depression but denies any SI, HI, thoughts harm self or others. Reports that she recently completed rehab but relapsed shortly after. Denies any history of withdrawal seizures. No 302 criteria, patient does not meet criteria for alcohol detox. [facility] contacted. Amount and/or Complexity of Data Reviewed  Labs: ordered. Risk  Prescription drug management. Disposition  Final diagnoses:   Alcohol abuse   Depression     Time reflects when diagnosis was documented in both MDM as applicable and the Disposition within this note     Time User Action Codes Description Comment    7/21/2023  5:48 PM Ki Jarrell Add [F10.929] Alcohol intoxication ([address])     7/21/2023  5:49 PM Ki Jarrell Remove [F10.929] Alcohol intoxication ([address])     7/21/2023  5:49 PM Ki Jarrell Add [F10.10] Alcohol abuse     7/21/2023  5:49 PM Ki Obrien. A] Depression       ED Disposition     ED Disposition   Discharge    Condition   Stable    Date/Time   Fri Jul 21, 2023  5:48 PM    Comment   Arpita William discharge to home/self care. Follow-up Information    None         Patient's Medications   Discharge Prescriptions    No medications on file       No discharge procedures on file.     PDMP Review       Value Time User    PDMP Reviewed  Yes 6/4/2022  6:32 PM Royce Smallwood MD          ED Provider  Electronically Signed by           Ki Jarrell PA-C  07/21/23 3068

## 2023-07-21 NOTE — ED NOTES
This writer discussed the patients current presentation and recommended discharge plan with   They agree with the patient being discharged at this time. The patient was Instructed to follow up with their Psychiatrist and PCP. This writer and the patient completed a safety plan. The patient was provided with a copy of their safety plan with encouragement to utilize the plan following discharge. In addition, the patient was instructed to call local [facility] crisis, other crisis services, [facility] or to go to the nearest ER immediately if their situation changes at any time. This writer discussed discharge plans with the patient, who agrees with and understands the discharge plans.          SAFETY PLAN  Warning Signs (thoughts, images, mood, behavior, situations) of a potential crisis: Relapse with alcohol      Coping Skills (what can I do to take my mind off the problem, or to keep myself safe): spend time with my family      Outside Support (who can I reach out to for support and help) Therapist and family        [place] Suicide Prevention Hotline:  [address] [place] [address] Drive [address]: [address]: [address] [place] [address] [address] [phone] - Crisis   [phone] - Peer Support Talk Line (1-9pm daily)  [phone] - Teen Support Talk Line (1-9pm daily)  [address] [place] [address] [address] [place] [address]) [phone] - 127 [facility]

## 2023-07-21 NOTE — DISCHARGE INSTRUCTIONS
This writer discussed the patients current presentation and recommended discharge plan with   They agree with the patient being discharged at this time. The patient was Instructed to follow up with their Psychiatrist and PCP. This writer and the patient completed a safety plan. The patient was provided with a copy of their safety plan with encouragement to utilize the plan following discharge. In addition, the patient was instructed to call local [facility] crisis, other crisis services, [facility] or to go to the nearest ER immediately if their situation changes at any time. This writer discussed discharge plans with the patient, who agrees with and understands the discharge plans.             SAFETY PLAN  Warning Signs (thoughts, images, mood, behavior, situations) of a potential crisis: Relapse with alcohol        Coping Skills (what can I do to take my mind off the problem, or to keep myself safe): spend time with my family        Outside Support (who can I reach out to for support and help) Therapist and family           National Suicide Prevention Hotline:  [address] [place] [address] Drive: [address]: [address] [place] [address] [address] [place] [address] [address] [place] [address] [phone] - Crisis   [phone] - Peer Support Talk Line (1-9pm daily)  [phone] - Teen Support Talk Line (1-9pm daily)  [address] [place] [address] [address] [place] [address] [address] [place] [address] [address] [place] [address]) [phone] - 127 [facility]

## 2023-07-21 NOTE — ED NOTES
Star Beth states that someone from second shift will be in to speak to patient about desire for detox      Roberta Peterson, JOSE RAMON  07/21/23 5573

## 2023-07-22 DIAGNOSIS — F41.1 GENERALIZED ANXIETY DISORDER: ICD-10-CM

## 2023-07-22 RX ORDER — TRAZODONE HYDROCHLORIDE 100 MG/1
100 TABLET ORAL
Qty: 90 TABLET | Refills: 1 | Status: SHIPPED | OUTPATIENT
Start: 2023-07-22

## 2023-07-23 DIAGNOSIS — E03.9 HYPOTHYROIDISM, UNSPECIFIED TYPE: ICD-10-CM

## 2023-07-23 RX ORDER — LEVOTHYROXINE SODIUM 0.05 MG/1
50 TABLET ORAL DAILY
Qty: 30 TABLET | Refills: 5 | Status: SHIPPED | OUTPATIENT
Start: 2023-07-23

## 2023-07-24 ENCOUNTER — TELEPHONE (OUTPATIENT)
Dept: FAMILY MEDICINE CLINIC | Facility: HOSPITAL | Age: 38
End: 2023-07-24

## 2023-08-02 DIAGNOSIS — I10 ESSENTIAL HYPERTENSION: ICD-10-CM

## 2023-08-02 RX ORDER — BISOPROLOL FUMARATE 10 MG/1
TABLET, FILM COATED ORAL
Qty: 90 TABLET | Refills: 2 | OUTPATIENT
Start: 2023-08-02

## 2023-09-02 DIAGNOSIS — F41.1 GENERALIZED ANXIETY DISORDER: ICD-10-CM

## 2023-09-04 RX ORDER — VENLAFAXINE HYDROCHLORIDE 150 MG/1
CAPSULE, EXTENDED RELEASE ORAL
Qty: 90 CAPSULE | Refills: 1 | Status: SHIPPED | OUTPATIENT
Start: 2023-09-04

## 2023-09-15 ENCOUNTER — OFFICE VISIT (OUTPATIENT)
Dept: FAMILY MEDICINE CLINIC | Facility: HOSPITAL | Age: 38
End: 2023-09-15
Payer: COMMERCIAL

## 2023-09-15 VITALS
BODY MASS INDEX: 49.08 KG/M2 | DIASTOLIC BLOOD PRESSURE: 90 MMHG | HEIGHT: 63 IN | TEMPERATURE: 98.5 F | SYSTOLIC BLOOD PRESSURE: 118 MMHG | HEART RATE: 98 BPM | WEIGHT: 277 LBS

## 2023-09-15 DIAGNOSIS — L30.1 DYSHIDROTIC ECZEMA: ICD-10-CM

## 2023-09-15 DIAGNOSIS — I10 ESSENTIAL HYPERTENSION: ICD-10-CM

## 2023-09-15 DIAGNOSIS — F39 MOOD DISORDER (HCC): Primary | ICD-10-CM

## 2023-09-15 DIAGNOSIS — F33.1 MODERATE EPISODE OF RECURRENT MAJOR DEPRESSIVE DISORDER (HCC): ICD-10-CM

## 2023-09-15 PROCEDURE — 99214 OFFICE O/P EST MOD 30 MIN: CPT | Performed by: INTERNAL MEDICINE

## 2023-09-15 RX ORDER — LAMOTRIGINE 200 MG/1
200 TABLET ORAL DAILY
Qty: 30 TABLET
Start: 2023-09-15 | End: 2023-09-15

## 2023-09-15 RX ORDER — BISOPROLOL FUMARATE 10 MG/1
10 TABLET, FILM COATED ORAL DAILY
Qty: 30 TABLET | Refills: 5
Start: 2023-09-15 | End: 2023-09-15 | Stop reason: SDUPTHER

## 2023-09-15 RX ORDER — BISOPROLOL FUMARATE 10 MG/1
10 TABLET, FILM COATED ORAL DAILY
Qty: 30 TABLET | Refills: 5 | Status: SHIPPED | OUTPATIENT
Start: 2023-09-15

## 2023-09-15 RX ORDER — LAMOTRIGINE 100 MG/1
200 TABLET ORAL DAILY
Qty: 10 TABLET | Refills: 0
Start: 2023-09-15

## 2023-09-15 NOTE — ASSESSMENT & PLAN NOTE
Poss bipolar and following with addiction specialist and therapist, med list updated, stressed importance of f/u with psychiatrist/addiction specialist as with her mult admissions and concurrent mood and substance use dx that was the best option of treatment for her, stressed she should not stop medications so if she is not able to get refills to let us know, re-eval in 6-8 wks and watch closely

## 2023-09-15 NOTE — ASSESSMENT & PLAN NOTE
BP a bit high today but pt admits she had been out of her bisoprolol for 3-4 days prior to today's appt, will restart BP med and recheck in 6-8 wks

## 2023-09-15 NOTE — ASSESSMENT & PLAN NOTE
Tx with PO Prednisone while IP, doing much better, uses Benadryl prn, states topical steroids did not help at all in the past, call with any new/worse symptoms or with any concern of infection

## 2023-09-15 NOTE — PROGRESS NOTES
Name: Kennedy Dan      : 1985      MRN: [id number]  Encounter Provider: Gera Merritt DO  Encounter Date: 9/15/2023   Encounter department: [address]     1. Mood disorder [facility])  Assessment & Plan:  Poss bipolar and following with addiction specialist and therapist, med list updated, stressed importance of f/u with psychiatrist/addiction specialist as with her mult admissions and concurrent mood and substance use dx that was the best option of treatment for her, stressed she should not stop medications so if she is not able to get refills to let us know, re-eval in 6-8 wks and watch closely      2. Moderate episode of recurrent major depressive disorder [facility])  Assessment & Plan:  Mood doing much better with Lamictal and med changes, con't meds and f/u as per psych and therapist    Orders:  -     lamoTRIgine (LaMICtal) 100 mg tablet; Take 2 tablets (200 mg total) by mouth in the morning    3. Essential hypertension  Assessment & Plan:  BP a bit high today but pt admits she had been out of her bisoprolol for 3-4 days prior to today's appt, will restart BP med and recheck in 6-8 wks    Orders:  -     bisoprolol (ZEBETA) 10 MG tablet; Take 1 tablet (10 mg total) by mouth daily    4. Dyshidrotic eczema  Assessment & Plan: Tx with PO Prednisone while IP, doing much better, uses Benadryl prn, states topical steroids did not help at all in the past, call with any new/worse symptoms or with any concern of infection        BW     PAP       Subjective      HPI Pt here for an acute visit    Pt with recent IP admission for ETOH and depression. Her  and pt herself were both recently admitted for IP detox. She and her  are both in IOP. She feels good and feels like a different person with the med changes/Lamictal specifically.   She still feels anxious still but feels she has better coping skills and has started to really like meditation. Her kids are with her sister currently. She is going to [facility] in [facility]. She is seeing an addiction specialist and therapist.  She states they think she is bipolar but was given diagnosis of mood disorder NOS and has to be clean for at least 6-12 mos before would say if she was bipolar. She has had mult med changes and med list reviewed with pt in detail and updated. BP a bit above goal today. Pt admits she was out of her bisoprolol for 4 days and just restated it yesterday and had a lot of coffee this am.      Notes a h/o dyshidrotic eczema and ichthyosis and a lot of peeling/sore skin on her hands. She was tx while IP with PO steroid and benadryl. She feels it is just about gone. Review of Systems   Constitutional: Negative for chills and fever. Eyes: Negative for pain and visual disturbance. Respiratory: Negative for shortness of breath. Cardiovascular: Negative for chest pain and palpitations. Gastrointestinal: Negative for abdominal pain, diarrhea and nausea. Genitourinary: Negative for dysuria. Skin: Positive for rash. Negative for wound. Neurological: Negative for dizziness and headaches. Psychiatric/Behavioral: The patient is nervous/anxious.         Current Outpatient Medications on File Prior to Visit   Medication Sig   • fluticasone (FLONASE) 50 mcg/act nasal spray 1 spray into each nostril daily   • hydrOXYzine pamoate (VISTARIL) 50 mg capsule TAKE 1 CAPSULE BY MOUTH EVERY DAY AS NEEDED NO MORE THAN 6 CAPSULES IN 24 HOURS   • levothyroxine 50 mcg tablet TAKE 1 TABLET BY MOUTH EVERY DAY   • Multiple Vitamins-Minerals (MULTIVITAMIN ADULT PO) Take 1 tablet by mouth daily   • naltrexone (REVIA) 50 mg tablet Take 50 mg by mouth daily   • risperiDONE (RisperDAL) 0.25 mg tablet TAKE 1 TABLET STARTING AT 3:00 PM THREE TIMES A DAY AT 7:00 AM, 3:00 PM, 9:30 PM   • Tranexamic Acid 650 MG TABS Take 2 tablets (1,300 mg total) by mouth 3 (three) times a day During heavy days of menstrual cycle, maximum 5 days of use per month   • traZODone (DESYREL) 100 mg tablet TAKE 1 TABLET BY MOUTH DAILY AT BEDTIME   • venlafaxine (EFFEXOR-XR) 150 mg 24 hr capsule TAKE 1 CAPSULE BY MOUTH EVERY DAY   • [DISCONTINUED] bisoprolol (ZEBETA) 10 MG tablet TAKE 1 TABLET (10 MG) ALONG WITH A 5MG TABLET TO = 15 MG DAILY   • [DISCONTINUED] busPIRone (BUSPAR) 7.5 mg tablet TAKE 1 TABLET BY MOUTH TWICE A DAY   • [DISCONTINUED] disulfiram (ANTABUSE) 250 mg tablet TAKE 1 TABLET BY MOUTH EVERYDAY AS NEEDED IN HIGH RISK SETTINGS   • [DISCONTINUED] lamoTRIgine (LaMICtal) 100 mg tablet Take 1 tablet (100 mg total) by mouth in the morning   • [DISCONTINUED] venlafaxine (EFFEXOR-XR) 75 mg 24 hr capsule TAKE 1 CAPSULE (75 MG) ALONG WITH 150 MG CAPSULE TO = 225 MG DAILY       Objective     /90   Pulse 98   Temp 98.5 °F (36.9 °C) (Tympanic)   Ht 5' 3" (1.6 m)   Wt 126 kg (277 lb)   BMI 49.07 kg/m²     Physical Exam  Vitals and nursing note reviewed. Constitutional:       General: She is not in acute distress. Appearance: She is not ill-appearing. HENT:      Head: Normocephalic and atraumatic. Eyes:      General:         Right eye: No discharge. Left eye: No discharge. Conjunctiva/sclera: Conjunctivae normal.   Pulmonary:      Effort: Pulmonary effort is normal. No respiratory distress. Skin:     Coloration: Skin is not pale. Findings: No bruising or erythema. Comments: Mild peeling skin on hands B/L   Psychiatric:         Behavior: Behavior normal.         Thought Content:  Thought content normal.         Judgment: Judgment normal.      Comments: Mildly anxious appearing       Lucina Machuca DO

## 2023-10-21 LAB
ALBUMIN SERPL-MCNC: 4.1 G/DL (ref 3.6–5.1)
ALBUMIN/GLOB SERPL: 1.4 (CALC) (ref 1–2.5)
ALP SERPL-CCNC: 59 U/L (ref 31–125)
ALT SERPL-CCNC: 9 U/L (ref 6–29)
AST SERPL-CCNC: 10 U/L (ref 10–30)
BILIRUB SERPL-MCNC: 0.2 MG/DL (ref 0.2–1.2)
BUN SERPL-MCNC: 10 MG/DL (ref 7–25)
BUN/CREAT SERPL: NORMAL (CALC) (ref 6–22)
CALCIUM SERPL-MCNC: 9.3 MG/DL (ref 8.6–10.2)
CHLORIDE SERPL-SCNC: 106 MMOL/L (ref 98–110)
CO2 SERPL-SCNC: 25 MMOL/L (ref 20–32)
CREAT SERPL-MCNC: 0.72 MG/DL (ref 0.5–0.97)
GFR/BSA.PRED SERPLBLD CYS-BASED-ARV: 111 ML/MIN/1.73M2
GLOBULIN SER CALC-MCNC: 2.9 G/DL (CALC) (ref 1.9–3.7)
GLUCOSE SERPL-MCNC: 70 MG/DL (ref 65–139)
POTASSIUM SERPL-SCNC: 4.4 MMOL/L (ref 3.5–5.3)
PROT SERPL-MCNC: 7 G/DL (ref 6.1–8.1)
SODIUM SERPL-SCNC: 139 MMOL/L (ref 135–146)

## 2023-11-25 DIAGNOSIS — E03.9 HYPOTHYROIDISM, UNSPECIFIED TYPE: ICD-10-CM

## 2023-11-26 RX ORDER — LEVOTHYROXINE SODIUM 0.05 MG/1
50 TABLET ORAL DAILY
Qty: 30 TABLET | Refills: 0 | Status: SHIPPED | OUTPATIENT
Start: 2023-11-26

## 2023-12-15 DIAGNOSIS — F41.1 GENERALIZED ANXIETY DISORDER: ICD-10-CM

## 2023-12-15 RX ORDER — VENLAFAXINE HYDROCHLORIDE 150 MG/1
CAPSULE, EXTENDED RELEASE ORAL
Qty: 90 CAPSULE | Refills: 1 | Status: SHIPPED | OUTPATIENT
Start: 2023-12-15

## 2023-12-18 ENCOUNTER — OFFICE VISIT (OUTPATIENT)
Dept: FAMILY MEDICINE CLINIC | Facility: HOSPITAL | Age: 38
End: 2023-12-18
Payer: COMMERCIAL

## 2023-12-18 VITALS
TEMPERATURE: 98.9 F | WEIGHT: 283.8 LBS | SYSTOLIC BLOOD PRESSURE: 118 MMHG | BODY MASS INDEX: 50.29 KG/M2 | DIASTOLIC BLOOD PRESSURE: 80 MMHG | HEART RATE: 69 BPM | HEIGHT: 63 IN

## 2023-12-18 DIAGNOSIS — E66.01 MORBID OBESITY WITH BMI OF 50.0-59.9, ADULT (HCC): ICD-10-CM

## 2023-12-18 DIAGNOSIS — R21 RASH: Primary | ICD-10-CM

## 2023-12-18 DIAGNOSIS — F39 MOOD DISORDER (HCC): ICD-10-CM

## 2023-12-18 DIAGNOSIS — I10 ESSENTIAL HYPERTENSION: ICD-10-CM

## 2023-12-18 PROCEDURE — 99214 OFFICE O/P EST MOD 30 MIN: CPT | Performed by: INTERNAL MEDICINE

## 2023-12-18 RX ORDER — BUSPIRONE HYDROCHLORIDE 5 MG/1
7.5 TABLET ORAL 3 TIMES DAILY
Start: 2023-12-18 | End: 2023-12-18

## 2023-12-18 RX ORDER — BUSPIRONE HYDROCHLORIDE 5 MG/1
5 TABLET ORAL 3 TIMES DAILY
Start: 2023-12-18

## 2023-12-18 RX ORDER — LAMOTRIGINE 200 MG/1
200 TABLET ORAL DAILY
COMMUNITY
Start: 2023-10-03

## 2023-12-18 RX ORDER — TRIAMCINOLONE ACETONIDE 5 MG/G
CREAM TOPICAL 2 TIMES DAILY
Qty: 30 G | Refills: 1 | Status: SHIPPED | OUTPATIENT
Start: 2023-12-18

## 2023-12-18 RX ORDER — BUSPIRONE HYDROCHLORIDE 7.5 MG/1
7.5 TABLET ORAL 2 TIMES DAILY
Start: 2023-12-18 | End: 2023-12-18 | Stop reason: SDUPTHER

## 2023-12-18 NOTE — ASSESSMENT & PLAN NOTE
Has established with psych and doing well on current meds - med list updated today, con't meds/follow up as per mental health

## 2023-12-18 NOTE — PROGRESS NOTES
Name: Susan Barrett      : 1985      MRN: [id number]  Encounter Provider: Angelina Woo DO  Encounter Date: 2023   Encounter department: [facility] CARE [address]     Assessment & Plan     1. Rash  Comments:  Rash under axilla looks more fungal - con't topical antifungal given by virtual visit, rash around umbilicus looks eczema/lisbeth skin/local dermatitis - steroid cream rx sent - SE reviewed as was importance of not using on face/genitals, urged to keep axillary skin clean and dry and can use [employer] pwdr OTC prn,  also stressed importance of keeping moisturized around umbilicus and keeping hydrated, call if no better OR with new worse symptoms  Orders:  -     triamcinolone (KENALOG) 0.5 % cream; Apply topically 2 (two) times a day    2. Mood disorder (HCC)  Assessment & Plan:  Has established with psych and doing well on current meds - med list updated today, con't meds/follow up as per mental health  Orders:  -     busPIRone (BUSPAR) 5 mg tablet; Take 1 tablet (5 mg total) by mouth 3 (three) times a day    3. Essential hypertension  Assessment & Plan:  BP great, con't current regimen, healthy diet and regular exercise encouraged, recheck in 3 mos      4. Morbid obesity with BMI of 50.0-59.9, adult ([facility])  Assessment & Plan:  Healthy diet and regular exercise encouraged         PAP     BW     Subjective      Rash  This is a new problem. The current episode started 1 to 4 weeks ago. The problem has been waxing and waning since onset. The affected locations include the neck, abdomen, left axilla, left wrist, left buttock, left upper leg, right wrist, right buttock and right upper leg. The rash is characterized by burning, draining, itchiness and peeling. She was exposed to chemicals and an ill contact. Associated symptoms include rhinorrhea and a sore throat. Pertinent negatives include no anorexia, congestion, cough, diarrhea, facial edema, fatigue, fever, joint  "pain, shortness of breath or vomiting.    Pt here for an acute visit    She has had a rash under her axilla/around belly button and at gluteal cleft.  She was seen for a virtual visit and given a rx for Cephalexin the week of Thanksgiving. She finished the abx and symptoms had resolved.  She notes flaky skin and some itchiness after the abx as \"my skin was healing\".  Sat am she started with similar rash and itching.  She notes no F/C and feels otherwise.  She notes no new meds/soaps/lotions/detergents.  She notes no recent travel or environmental exposure.  She notes no household contacts with similar rash.     Pt has established with a new psychiatrist.  She was started on Buspar and taken off Risperdal.  Still  having some anxiety issues but feels they are on the right path.  Is going to establish with a therapist soon        Review of Systems   Constitutional:  Negative for chills, fatigue and fever.   HENT:  Positive for rhinorrhea and sore throat. Negative for congestion.    Eyes:  Negative for pain.   Respiratory:  Negative for cough and shortness of breath.    Gastrointestinal:  Positive for nausea. Negative for anorexia, diarrhea and vomiting.   Musculoskeletal:  Negative for joint pain.   Skin:  Positive for rash. Negative for wound.   Neurological:  Negative for dizziness and headaches.   Psychiatric/Behavioral:  Negative for behavioral problems. The patient is nervous/anxious.        Current Outpatient Medications on File Prior to Visit   Medication Sig    lamoTRIgine (LaMICtal) 200 MG tablet Take 200 mg by mouth daily    bisoprolol (ZEBETA) 10 MG tablet Take 1 tablet (10 mg total) by mouth daily    fluticasone (FLONASE) 50 mcg/act nasal spray 1 spray into each nostril daily    hydrOXYzine pamoate (VISTARIL) 50 mg capsule TAKE 1 CAPSULE BY MOUTH EVERY DAY AS NEEDED NO MORE THAN 6 CAPSULES IN 24 HOURS    levothyroxine 50 mcg tablet TAKE 1 TABLET BY MOUTH EVERY DAY    Multiple Vitamins-Minerals " "(MULTIVITAMIN ADULT PO) Take 1 tablet by mouth daily    naltrexone (REVIA) 50 mg tablet Take 50 mg by mouth daily    Tranexamic Acid 650 MG TABS Take 2 tablets (1,300 mg total) by mouth 3 (three) times a day During heavy days of menstrual cycle, maximum 5 days of use per month    traZODone (DESYREL) 100 mg tablet TAKE 1 TABLET BY MOUTH DAILY AT BEDTIME    venlafaxine (EFFEXOR-XR) 150 mg 24 hr capsule TAKE 1 CAPSULE BY MOUTH EVERY DAY    [DISCONTINUED] lamoTRIgine (LaMICtal) 100 mg tablet Take 2 tablets (200 mg total) by mouth in the morning    [DISCONTINUED] risperiDONE (RisperDAL) 0.25 mg tablet TAKE 1 TABLET STARTING AT 3:00 PM THREE TIMES A DAY AT 7:00 AM, 3:00 PM, 9:30 PM       Objective     /80   Pulse 69   Temp 98.9 °F (37.2 °C)   Ht 5' 3\" (1.6 m)   Wt 129 kg (283 lb 12.8 oz)   BMI 50.27 kg/m²     Physical Exam  Vitals and nursing note reviewed.   Constitutional:       General: She is not in acute distress.     Appearance: She is well-developed. She is not ill-appearing.   HENT:      Head: Normocephalic and atraumatic.   Eyes:      General:         Right eye: No discharge.         Left eye: No discharge.      Conjunctiva/sclera: Conjunctivae normal.   Neck:      Trachea: No tracheal deviation.   Cardiovascular:      Rate and Rhythm: Normal rate and regular rhythm.      Heart sounds: Normal heart sounds. No murmur heard.     No friction rub.   Pulmonary:      Effort: Pulmonary effort is normal. No respiratory distress.      Breath sounds: Normal breath sounds. No wheezing, rhonchi or rales.   Musculoskeletal:         General: No deformity or signs of injury.      Cervical back: Neck supple.   Skin:     General: Skin is warm.      Coloration: Skin is not pale.      Findings: Rash present.      Comments: R axilla - erythema with some scaling skin, no papules/pustules/drainage noted  Umbilicus - generalized erythema but no warmth/drainage/papules/pustules noted   Neurological:      General: No focal " deficit present.      Mental Status: She is alert. Mental status is at baseline.      Motor: No abnormal muscle tone.      Gait: Gait normal.   Psychiatric:         Mood and Affect: Mood normal.         Behavior: Behavior normal.         Thought Content: Thought content normal.         Judgment: Judgment normal.       Angelina Woo,

## 2023-12-28 DIAGNOSIS — E03.9 HYPOTHYROIDISM, UNSPECIFIED TYPE: ICD-10-CM

## 2023-12-28 DIAGNOSIS — I10 ESSENTIAL HYPERTENSION: ICD-10-CM

## 2023-12-28 DIAGNOSIS — E03.9 HYPOTHYROIDISM, UNSPECIFIED TYPE: Primary | ICD-10-CM

## 2023-12-28 DIAGNOSIS — R74.01 TRANSAMINITIS: ICD-10-CM

## 2023-12-28 RX ORDER — LEVOTHYROXINE SODIUM 0.05 MG/1
50 TABLET ORAL DAILY
Qty: 90 TABLET | Refills: 0 | Status: SHIPPED | OUTPATIENT
Start: 2023-12-28

## 2023-12-28 NOTE — TELEPHONE ENCOUNTER
Please notify patient that their thyroid medication was approved but no refills were given. She is overdue for follow up appt and BW. She needs to make an appt within the next 30 days and have BW done prior to that appt to con't receiving medications.  TY

## 2023-12-28 NOTE — TELEPHONE ENCOUNTER
Pt aware. Pt did want to let you know that she was here for an appt earlier this month and scheduled a 3 month follow up for march. Pt states she will go get her blood work done as requested but she wants to know if she needs to be sooner than march. Please advise.

## 2023-12-28 NOTE — TELEPHONE ENCOUNTER
I apologize - she WAS seen this month but has not had thyroid labs recently - do the thyroid labs/BW now and can keep f/u appt for 3 mos

## 2024-01-04 DIAGNOSIS — D50.9 MICROCYTIC ANEMIA: Primary | ICD-10-CM

## 2024-01-04 LAB
ALBUMIN SERPL-MCNC: 4 G/DL (ref 3.6–5.1)
ALBUMIN/GLOB SERPL: 1.4 (CALC) (ref 1–2.5)
ALP SERPL-CCNC: 51 U/L (ref 31–125)
ALT SERPL-CCNC: 7 U/L (ref 6–29)
AST SERPL-CCNC: 9 U/L (ref 10–30)
BASOPHILS # BLD AUTO: 63 CELLS/UL (ref 0–200)
BASOPHILS NFR BLD AUTO: 0.9 %
BILIRUB SERPL-MCNC: 0.3 MG/DL (ref 0.2–1.2)
BUN SERPL-MCNC: 16 MG/DL (ref 7–25)
BUN/CREAT SERPL: ABNORMAL (CALC) (ref 6–22)
CALCIUM SERPL-MCNC: 9.3 MG/DL (ref 8.6–10.2)
CHLORIDE SERPL-SCNC: 105 MMOL/L (ref 98–110)
CO2 SERPL-SCNC: 28 MMOL/L (ref 20–32)
CREAT SERPL-MCNC: 0.67 MG/DL (ref 0.5–0.97)
EOSINOPHIL # BLD AUTO: 77 CELLS/UL (ref 15–500)
EOSINOPHIL NFR BLD AUTO: 1.1 %
ERYTHROCYTE [DISTWIDTH] IN BLOOD BY AUTOMATED COUNT: 15.3 % (ref 11–15)
FERRITIN SERPL-MCNC: <1 NG/ML (ref 16–154)
GFR/BSA.PRED SERPLBLD CYS-BASED-ARV: 116 ML/MIN/1.73M2
GLOBULIN SER CALC-MCNC: 2.9 G/DL (CALC) (ref 1.9–3.7)
GLUCOSE SERPL-MCNC: 85 MG/DL (ref 65–99)
HCT VFR BLD AUTO: 36.2 % (ref 35–45)
HGB BLD-MCNC: 11.6 G/DL (ref 11.7–15.5)
IRON SATN MFR SERPL: 7 % (CALC) (ref 16–45)
IRON SERPL-MCNC: 30 MCG/DL (ref 40–190)
LYMPHOCYTES # BLD AUTO: 2821 CELLS/UL (ref 850–3900)
LYMPHOCYTES NFR BLD AUTO: 40.3 %
MCH RBC QN AUTO: 25.6 PG (ref 27–33)
MCHC RBC AUTO-ENTMCNC: 32 G/DL (ref 32–36)
MCV RBC AUTO: 79.7 FL (ref 80–100)
MONOCYTES # BLD AUTO: 560 CELLS/UL (ref 200–950)
MONOCYTES NFR BLD AUTO: 8 %
NEUTROPHILS # BLD AUTO: 3479 CELLS/UL (ref 1500–7800)
NEUTROPHILS NFR BLD AUTO: 49.7 %
PLATELET # BLD AUTO: 285 THOUSAND/UL (ref 140–400)
PMV BLD REES-ECKER: 9.5 FL (ref 7.5–12.5)
POTASSIUM SERPL-SCNC: 5 MMOL/L (ref 3.5–5.3)
PROT SERPL-MCNC: 6.9 G/DL (ref 6.1–8.1)
RBC # BLD AUTO: 4.54 MILLION/UL (ref 3.8–5.1)
REF LAB TEST NAME: NORMAL
REF LAB TEST: NORMAL
SL AMB CLIENT CONTACT: NORMAL
SODIUM SERPL-SCNC: 137 MMOL/L (ref 135–146)
TIBC SERPL-MCNC: 452 MCG/DL (CALC) (ref 250–450)
TSH SERPL-ACNC: 1.69 MIU/L
WBC # BLD AUTO: 7 THOUSAND/UL (ref 3.8–10.8)

## 2024-01-05 DIAGNOSIS — D50.9 IRON DEFICIENCY ANEMIA, UNSPECIFIED IRON DEFICIENCY ANEMIA TYPE: Primary | ICD-10-CM

## 2024-01-26 ENCOUNTER — OFFICE VISIT (OUTPATIENT)
Dept: FAMILY MEDICINE CLINIC | Facility: HOSPITAL | Age: 39
End: 2024-01-26
Payer: COMMERCIAL

## 2024-01-26 VITALS
HEIGHT: 63 IN | HEART RATE: 82 BPM | WEIGHT: 290.2 LBS | DIASTOLIC BLOOD PRESSURE: 88 MMHG | SYSTOLIC BLOOD PRESSURE: 136 MMHG | BODY MASS INDEX: 51.42 KG/M2 | TEMPERATURE: 98.7 F

## 2024-01-26 DIAGNOSIS — F33.1 MODERATE EPISODE OF RECURRENT MAJOR DEPRESSIVE DISORDER (HCC): ICD-10-CM

## 2024-01-26 DIAGNOSIS — L03.316 CELLULITIS OF UMBILICUS: Primary | ICD-10-CM

## 2024-01-26 PROCEDURE — 99214 OFFICE O/P EST MOD 30 MIN: CPT | Performed by: INTERNAL MEDICINE

## 2024-01-26 RX ORDER — CEPHALEXIN 500 MG/1
500 CAPSULE ORAL 3 TIMES DAILY
Qty: 30 CAPSULE | Refills: 0 | Status: SHIPPED | OUTPATIENT
Start: 2024-01-26 | End: 2024-02-05

## 2024-01-26 NOTE — PATIENT INSTRUCTIONS
Depression   AMBULATORY CARE:   Depression  is a mood disorder that causes feelings of sadness or hopelessness that do not go away. Depression may cause you to lose interest in things you used to enjoy. These feelings may interfere with your daily life.  Common signs and symptoms:   Appetite changes, or weight gain or loss    Trouble falling or staying asleep, or sleeping too much    Fatigue (being mentally and physically tired) or lack of energy    Feeling restless, irritable, or withdrawn    Feeling worthless, hopeless, discouraged, or guilty    Trouble concentrating, remembering things, doing daily tasks, or making decisions    Thoughts about hurting or killing yourself    Call your local emergency number (911 in the [place]) if:   You think about hurting yourself or someone else.    You have done something on purpose to hurt yourself.    Call your therapist or doctor if:   Your symptoms get worse or do not get better with treatment.    Your depression keeps you from doing your regular daily activities.    You have new symptoms since your last visit.    You have questions or concerns about your condition or care.    The following resources are available at any time to help you, if needed:   Contact a suicide prevention organization:        For the [employer] Suicide and Crisis Lifeline:     Call or text [employer]     Send a chat on https://[employer]est[employer].org/chat     Call 1-234-115-2189 (1-800-273-TALK)    For the Suicide Hotline, call 3-453-805-6349 (7-466-XLRGJFJ)    For a list of international numbers: https://save.org/find-help/international-resources/  Treatment for depression  depends on how severe your symptoms are. You may need any of the following:  Cognitive behavioral therapy (CBT)  teaches you how to identify and change negative thought patterns.    Antidepressant medicine  may be given to decrease or manage symptoms. You may need to take this medicine for several weeks before they start working.    Self-care:   Talk to  no someone about your depression.  Your healthcare provider may suggest counseling. You might feel more comfortable talking with a friend or family member about your depression. Choose someone you know will be supportive and encouraging.    Get regular physical activity.  Physical activity can lower your stress, improve your mood, and help you sleep better. Work with your healthcare provider to develop a plan that you enjoy.         Create a regular sleep schedule.  A routine can help you relax before bed. Listen to music, read, or do yoga. Try to go to bed and wake up at the same time every day. Sleep is important for emotional health.    Eat a variety of healthy foods.  Healthy foods include fruits, vegetables, whole-grain breads, low-fat dairy products, lean meats, fish, and cooked beans. A healthy meal plan is low in fat, salt, and added sugar.         Do not use alcohol, drugs, or nicotine products.  These can worsen depression or make it hard to manage. Talk to your therapist or healthcare provider if you use any of these products and need help to quit.    Follow up with your therapist or doctor as directed:  Your healthcare provider will monitor your progress at follow-up visits. Your provider will also monitor your medicine if you take antidepressants and ask if the medicine is helping. Tell your provider about any side effects or problems you have with your medicine. The type or amount of medicine may need to be changed. Write down your questions so you remember to ask them during your visits.  For more information or support:   [employer]  [address]  [place] [address]  Phone: 5- 166 - 461-5528  Phone: 7- 418 - 995-8980  Web Address: http://www.yoli.org  [facility] Suicide and Crisis [facility]eline  [address]  [place] [address]  Phone: 0- 776 - 182  Web Address: http://www.suicidepreventionlifeline.org OR https://Zumobi.org/chat/    © Copyright [employer] 2023  Information is for End User's use only and may not be sold, redistributed or otherwise used for commercial purposes.  The above information is an  only. It is not intended as medical advice for individual conditions or treatments. Talk to your doctor, nurse or pharmacist before following any medical regimen to see if it is safe and effective for you.

## 2024-01-26 NOTE — PROGRESS NOTES
Name: Susan Barrett      : 1985      MRN: [id number]  Encounter Provider: Angelina Woo DO  Encounter Date: 2024   Encounter department: [facility] CARE [address]     Assessment & Plan     1. Cellulitis of umbilicus  Comments:  Reviewed with pt that with redness so localized I suspected contact dermatitis, pt not able to id trigger and noted worse symptoms with steroid cream AND had improvement in symptoms after abx - leads to more infectious cause, Wound cx was neg, will trial Keflex 500 mg q 8 hrs x 10 days, advised to keep area clean with soap and water and avoid picking, discussed further eval for underlying cause so as to not con't repetitive abx use, with neg culture and suspicion of contact dermatitis I recommended Derm eval over ID,  pt not able to id any specific contact/skin triggers, call with new/worse symptoms or with significant SE with abx  Orders:  -     cephalexin (KEFLEX) 500 mg capsule; Take 1 capsule (500 mg total) by mouth 3 (three) times a day for 10 days  -     Ambulatory Referral to Dermatology; Future    2. Moderate episode of recurrent major depressive disorder (HCC)  Assessment & Plan:  Con't current meds for now, call with new/worse mood, re-eval at appt in March         PAP     BW     Subjective      HPI Pt here for an acute visit    At last visit she was noting rash on her abd.  It was felt to be contact dermatitis and a steroid cream was given.  Pt noted the cream stung and did not help so she stopped it after 2-3 days.  She noted the redness persisted and was tender and warm to the touch. She started to note copious yellow/greenish discharge.  Pt was seen at [facility] on 24.  Wound was manipulated and pus expressed - wound culture was sent off and had no growth.  Pt was tx with Clindamycin and told to f/u with PCP.  She finished the abx and symptoms greatly improved but she was not yet back to baseline. 2 days ago symptoms started to get  "worse again- \"itchy and wet and draining\". She notes the past day she has more pain as well.  She notes no F/C/V but has had some nausea.         Review of Systems   Constitutional:  Positive for fatigue. Negative for chills and fever.   Respiratory:  Negative for cough and shortness of breath.    Cardiovascular:  Negative for chest pain and palpitations.   Gastrointestinal:  Positive for nausea. Negative for abdominal pain, diarrhea and vomiting.   Skin:  Positive for color change and rash. Negative for wound.   Neurological:  Negative for dizziness and headaches.   Psychiatric/Behavioral:  Positive for dysphoric mood.        Current Outpatient Medications on File Prior to Visit   Medication Sig    bisoprolol (ZEBETA) 10 MG tablet Take 1 tablet (10 mg total) by mouth daily    busPIRone (BUSPAR) 5 mg tablet Take 1 tablet (5 mg total) by mouth 3 (three) times a day    fluticasone (FLONASE) 50 mcg/act nasal spray 1 spray into each nostril daily    hydrOXYzine pamoate (VISTARIL) 50 mg capsule TAKE 1 CAPSULE BY MOUTH EVERY DAY AS NEEDED NO MORE THAN 6 CAPSULES IN 24 HOURS    lamoTRIgine (LaMICtal) 200 MG tablet Take 200 mg by mouth daily    levothyroxine 50 mcg tablet TAKE 1 TABLET BY MOUTH EVERY DAY    Multiple Vitamins-Minerals (MULTIVITAMIN ADULT PO) Take 1 tablet by mouth daily    naltrexone (REVIA) 50 mg tablet Take 50 mg by mouth daily    Tranexamic Acid 650 MG TABS Take 2 tablets (1,300 mg total) by mouth 3 (three) times a day During heavy days of menstrual cycle, maximum 5 days of use per month    traZODone (DESYREL) 100 mg tablet TAKE 1 TABLET BY MOUTH DAILY AT BEDTIME    triamcinolone (KENALOG) 0.5 % cream Apply topically 2 (two) times a day    venlafaxine (EFFEXOR-XR) 150 mg 24 hr capsule TAKE 1 CAPSULE BY MOUTH EVERY DAY       Objective     /88   Pulse 82   Temp 98.7 °F (37.1 °C) (Tympanic)   Ht 5' 3\" (1.6 m)   Wt 132 kg (290 lb 3.2 oz)   BMI 51.41 kg/m²     Physical Exam  Vitals and nursing note " reviewed.   Constitutional:       General: She is not in acute distress.     Appearance: She is well-developed. She is not ill-appearing.   HENT:      Head: Normocephalic and atraumatic.   Eyes:      General:         Right eye: No discharge.         Left eye: No discharge.      Conjunctiva/sclera: Conjunctivae normal.   Neck:      Trachea: No tracheal deviation.   Cardiovascular:      Rate and Rhythm: Normal rate and regular rhythm.      Heart sounds: Normal heart sounds. No murmur heard.     No friction rub.   Pulmonary:      Effort: Pulmonary effort is normal. No respiratory distress.      Breath sounds: Normal breath sounds. No wheezing, rhonchi or rales.   Musculoskeletal:      Cervical back: Neck supple.   Skin:     General: Skin is warm.      Coloration: Skin is not pale.      Findings: Rash present.      Comments: Ill defined area of erythema but no significant warmth circumferentially around the umbilicus and within the umbilicus, no subcutaneous induration/tenderness/active drainage noted   Neurological:      General: No focal deficit present.      Mental Status: She is alert.      Motor: No abnormal muscle tone.      Gait: Gait normal.   Psychiatric:         Mood and Affect: Mood normal.         Behavior: Behavior normal.         Thought Content: Thought content normal.         Judgment: Judgment normal.       Angelina Woo DO  Depression Screening Follow-up Plan: Patient's depression screening was positive with a PHQ-2 score of . Their PHQ-9 score was 5. Patient assessed for underlying major depression. They have no active suicidal ideations. Brief counseling provided and recommend additional follow-up/re-evaluation next office visit.

## 2024-02-02 NOTE — PROGRESS NOTES
Hematology/Oncology Outpatient Consult Note  Susan Barrett 38 y.o. female MRN: @ Encounter: [id number]        Date:  2/5/2024        CC: Severe iron deficiency, microcytic, hypochromic anemia      HPI:  Susan Barrett is a 38-year-old female who is referred to hematology for evaluation of severe iron deficiency anemia.  She is being seen for initial consultation 2/5/2024     Patient presents today with her spouse.  Most recent blood work demonstrates mild anemia with microcytic/hypochromic indices and undetectable serum iron levels.  Serum iron <30, iron saturation 7%, serum ferritin <1, TIBC 452  Hemoglobin 11.6, MCV 79, MCH 25, RDW 15.3.  Remainder CBC with differential is normal.  Baseline hemoglobin is 13    Patient has a history of menorrhagia.  She has seen gynecology for this in the past and is prescribed Tranexamic Acid to use as needed for heavy menstrual flow.    Patient reports she menstruates regularly, approximately every 30 days.  She states about 4 times out of the year she bleeds very heavily and requires change of pad almost every hour  She reports a long history of menorrhagia and states it also runs in her family    Denies any other abnormal bleeding.  No history of postpartum hemorrhage      Father had colon cancer    She is symptomatic from iron deficiency and reports significant fatigue, lightheadedness/dizziness on occasion, exertional dyspnea, restless legs, strong cravings for ice    Test Results:    Imaging: No results found.    Labs:   Lab Results   Component Value Date    WBC 7.0 01/03/2024    HGB 11.6 (L) 01/03/2024    HCT 36.2 01/03/2024    MCV 79.7 (L) 01/03/2024     01/03/2024     Lab Results   Component Value Date    K 5.0 01/03/2024     01/03/2024    CO2 28 01/03/2024    BUN 16 01/03/2024    CREATININE 0.67 01/03/2024    GLUCOSE 74 07/02/2021    CALCIUM 9.3 01/03/2024    CORRECTEDCA 9.0 06/04/2022    AST 9 (L) 01/03/2024    ALT 7 01/03/2024    ALKPHOS 51 01/03/2024     EGFR 116 2024             ROS:  Review of Systems   Constitutional:  Positive for fatigue.   Respiratory:  Positive for shortness of breath (exertional).    Genitourinary:  Positive for menstrual problem.   Neurological:  Positive for light-headedness.           Active Problems:   Patient Active Problem List   Diagnosis    Hypothyroidism    Morbid obesity with BMI of 50.0-59.9, adult ([facility])    Generalized anxiety disorder    History of gestational diabetes    Essential hypertension    Transaminitis    ETOH abuse    Current moderate episode of major depressive disorder ([facility])    Right flank pain    Diplopia    Mood disorder ([facility])    Dyshidrotic eczema    Iron deficiency anemia       Past Medical History:   Past Medical History:   Diagnosis Date    Alcohol withdrawal delirium, acute, hyperactive ([facility]) 2022    Allergic     Anxiety     Depression     Disease of thyroid gland     ETOH abuse     Insulin controlled gestational diabetes mellitus (GDM) during pregnancy, antepartum 2019    Metformin dinner/HS insulin  Last Assessment & Plan:  BG log reviewed today:- previous review on Friday with recommendation for increasing HS insulin  Fastings: high normal 1 hour PP: within the desired range   Discussed rationale and recommendation to change current medical therapy as listed:   Bedtime: 18 units Levemir  Continue same dose of Metformin, 1000 mg at dinner.  Denies signs and sympto    Miscarriage     Obesity     Otitis media        Surgical History:   Past Surgical History:   Procedure Laterality Date     SECTION  2018     SECTION  10/20/2017    DILATION AND CURETTAGE OF UTERUS      IR LUMBAR PUNCTURE  6/3/2022    MASTOIDECTOMY  2013    MASTOIDECTOMY Right     WISDOM TOOTH EXTRACTION         Family History:    Family History   Problem Relation Age of Onset    Depression Mother     Multiple sclerosis Mother     Thyroid disease Mother     Diabetes Mother             Hypertension  Mother     COPD Mother     Colon cancer Father     Rectal cancer Father 54    Hyperlipidemia Father     Hypertension Father     Alcohol abuse Father     Cancer Father     Asthma Sister     Hypertension Sister        Cancer-related family history includes Cancer in her father; Colon cancer in her father; Rectal cancer (age of onset: 54) in her father.    Social History:   Social History     Socioeconomic History    Marital status: /Civil Union     Spouse name: Not on file    Number of children: Not on file    Years of education: Not on file    Highest education level: Not on file   Occupational History    Not on file   Tobacco Use    Smoking status: Every Day     Current packs/day: 0.00     Types: Cigarettes     Last attempt to quit: 2017     Years since quittin.0    Smokeless tobacco: Never   Vaping Use    Vaping status: Never Used   Substance and Sexual Activity    Alcohol use: Not Currently     Comment: Quit 2022    Drug use: Yes     Types: Marijuana     Comment: social    Sexual activity: Yes     Partners: Male     Birth control/protection: Coitus interruptus   Other Topics Concern    Not on file   Social History Narrative    Not on file     Social Determinants of Health     Financial Resource Strain: Not on file   Food Insecurity: No Food Insecurity (6/3/2022)    Hunger Vital Sign     Worried About Running Out of Food in the Last Year: Never true     Ran Out of Food in the Last Year: Never true   Transportation Needs: No Transportation Needs (6/3/2022)    PRAPARE - Transportation     Lack of Transportation (Medical): No     Lack of Transportation (Non-Medical): No   Physical Activity: Not on file   Stress: Not on file   Social Connections: Not on file   Intimate Partner Violence: Not on file   Housing Stability: Low Risk  (6/3/2022)    Housing Stability Vital Sign     Unable to Pay for Housing in the Last Year: No     Number of Places Lived in the Last Year: 1     Unstable Housing in the  Last Year: No       Current Medications:   Current Outpatient Medications   Medication Sig Dispense Refill    bisoprolol (ZEBETA) 10 MG tablet Take 1 tablet (10 mg total) by mouth daily 30 tablet 5    busPIRone (BUSPAR) 5 mg tablet Take 1 tablet (5 mg total) by mouth 3 (three) times a day      cephalexin (KEFLEX) 500 mg capsule Take 1 capsule (500 mg total) by mouth 3 (three) times a day for 10 days 30 capsule 0    clindamycin (CLEOCIN) 300 MG capsule Take 300 mg by mouth every 8 (eight) hours      fluticasone (FLONASE) 50 mcg/act nasal spray 1 spray into each nostril daily      hydrOXYzine pamoate (VISTARIL) 50 mg capsule TAKE 1 CAPSULE BY MOUTH EVERY DAY AS NEEDED NO MORE THAN 6 CAPSULES IN 24 HOURS      lamoTRIgine (LaMICtal) 200 MG tablet Take 200 mg by mouth daily      levothyroxine 50 mcg tablet TAKE 1 TABLET BY MOUTH EVERY DAY 90 tablet 0    Multiple Vitamins-Minerals (MULTIVITAMIN ADULT PO) Take 1 tablet by mouth daily      Tranexamic Acid 650 MG TABS Take 2 tablets (1,300 mg total) by mouth 3 (three) times a day During heavy days of menstrual cycle, maximum 5 days of use per month 18 tablet 3    traZODone (DESYREL) 100 mg tablet TAKE 1 TABLET BY MOUTH DAILY AT BEDTIME 90 tablet 1    triamcinolone (KENALOG) 0.5 % cream Apply topically 2 (two) times a day 30 g 1    venlafaxine (EFFEXOR-XR) 150 mg 24 hr capsule TAKE 1 CAPSULE BY MOUTH EVERY DAY 90 capsule 1    naltrexone (REVIA) 50 mg tablet Take 50 mg by mouth daily (Patient not taking: Reported on 2/5/2024)       No current facility-administered medications for this visit.       Allergies:   Allergies   Allergen Reactions    Bee Venom Anaphylaxis    Contrast [Iodinated Contrast Media] Anaphylaxis         Physical Exam:    Body surface area is 2.27 meters squared.    Wt Readings from Last 3 Encounters:   02/05/24 132 kg (290 lb)   01/26/24 132 kg (290 lb 3.2 oz)   12/18/23 129 kg (283 lb 12.8 oz)        Temp Readings from Last 3 Encounters:   02/05/24 98.1 °F  (36.7 °C) (Temporal)   01/26/24 98.7 °F (37.1 °C) (Tympanic)   12/18/23 98.9 °F (37.2 °C)        BP Readings from Last 3 Encounters:   02/05/24 120/81   01/26/24 136/88   12/18/23 118/80         Pulse Readings from Last 3 Encounters:   02/05/24 68   01/26/24 82   12/18/23 69          Physical Exam  Constitutional:       General: She is not in acute distress.     Appearance: She is obese.   HENT:      Head: Normocephalic and atraumatic.   Eyes:      General: No scleral icterus.     Comments: Pale conjunctiva   Cardiovascular:      Rate and Rhythm: Normal rate and regular rhythm.   Pulmonary:      Effort: Pulmonary effort is normal.      Breath sounds: Normal breath sounds.   Abdominal:      Palpations: Abdomen is soft.   Skin:     General: Skin is warm and dry.   Neurological:      General: No focal deficit present.      Mental Status: She is alert and oriented to person, place, and time.   Psychiatric:         Mood and Affect: Mood normal.         Behavior: Behavior normal.              Assessment/ Plan:    1. Menorrhagia with regular cycle    2. Iron deficiency anemia, unspecified iron deficiency anemia type      Patient is a very pleasant 38-year-old female with symptomatic iron deficiency anemia likely in the setting of severe menorrhagia.  However since she also has a family history of colorectal cancer, I will refer her to GI for screening colonoscopy to rule out any other cause for her TEJAL.  Since she is symptomatic and given how low her iron stores are, she would benefit from parenteral iron replacement.  We did discuss IV iron replacement.  Potential side effects of IV iron could include but may not be limited to:  change in taste, diarrhea, muscle cramps, nausea or vomiting, pain in the arms or legs, pain or burning sensation in the injection site, allergic reaction.  The patient verbalized understanding and wishes to proceed.      I will set her up for IV Feraheme for 2 doses.  I did explain we will work  "to obtain insurance authorization.  If for some reason this is denied, I would give her IV Venofer 200 mg x 8 doses    I will see her back in 4 months with repeat blood work to see how she is feeling and how she responded to iron infusions. I will also check a VWF profile.  I have encouraged her to follow-up with gynecology to discuss management of her menorrhagia.    Patient is in agreement with this plan of care.  She knows to call anytime with questions or concerns    Portions of the record may have been created with voice recognition software.  Occasional wrong word or \"sound a like\" substitutions may have occurred due to the inherent limitations of voice recognition software.  Read the chart carefully and recognize, using context, where substitutions have occurred.          "

## 2024-02-05 ENCOUNTER — TELEPHONE (OUTPATIENT)
Age: 39
End: 2024-02-05

## 2024-02-05 ENCOUNTER — TELEPHONE (OUTPATIENT)
Dept: HEMATOLOGY ONCOLOGY | Facility: CLINIC | Age: 39
End: 2024-02-05

## 2024-02-05 ENCOUNTER — OFFICE VISIT (OUTPATIENT)
Age: 39
End: 2024-02-05
Payer: COMMERCIAL

## 2024-02-05 VITALS
WEIGHT: 290 LBS | HEART RATE: 68 BPM | BODY MASS INDEX: 51.38 KG/M2 | TEMPERATURE: 98.1 F | OXYGEN SATURATION: 98 % | SYSTOLIC BLOOD PRESSURE: 120 MMHG | DIASTOLIC BLOOD PRESSURE: 81 MMHG | HEIGHT: 63 IN

## 2024-02-05 DIAGNOSIS — N92.0 MENORRHAGIA WITH REGULAR CYCLE: Primary | ICD-10-CM

## 2024-02-05 DIAGNOSIS — D50.9 IRON DEFICIENCY ANEMIA, UNSPECIFIED IRON DEFICIENCY ANEMIA TYPE: Primary | ICD-10-CM

## 2024-02-05 DIAGNOSIS — D50.9 IRON DEFICIENCY ANEMIA, UNSPECIFIED IRON DEFICIENCY ANEMIA TYPE: ICD-10-CM

## 2024-02-05 PROCEDURE — 99204 OFFICE O/P NEW MOD 45 MIN: CPT | Performed by: NURSE PRACTITIONER

## 2024-02-05 RX ORDER — CLINDAMYCIN HYDROCHLORIDE 300 MG/1
300 CAPSULE ORAL EVERY 8 HOURS
COMMUNITY
Start: 2024-01-12

## 2024-02-05 RX ORDER — SODIUM CHLORIDE 9 MG/ML
20 INJECTION, SOLUTION INTRAVENOUS ONCE
OUTPATIENT
Start: 2024-02-19

## 2024-02-05 NOTE — TELEPHONE ENCOUNTER
What would be a preferred day of the week that would work best for your infusion appointment? Any day  Do you prefer mornings or afternoons for your appointments? Between 930a-230p  Are there any days or dates that do not work for your schedule, including any upcoming vacations? 2/12  We are going to try our best to schedule you at the infusion center closest to your home.  In the event that we are unable to what would be your next preferred infusion site or sites? [facility]  Do you have transportation to take you to all of your appointments? yes

## 2024-02-05 NOTE — TELEPHONE ENCOUNTER
Appointment Confirmation   Who are you speaking with? Patient   If it is not the patient, are they listed on an active communication consent form? N/A   Which provider is the appointment scheduled with?  GRACIELA Guthrie   When is the appointment scheduled?  Please list date and time 2/5/24 1pm   At which location is the appointment scheduled to take place? [place]   Did caller verbalize understanding of appointment details? Yes

## 2024-02-05 NOTE — TELEPHONE ENCOUNTER
Left detailed message with dates & times. Can view on [employer]. Gave my Teams # to call back for questions.

## 2024-02-07 DIAGNOSIS — F41.1 GENERALIZED ANXIETY DISORDER: ICD-10-CM

## 2024-02-07 RX ORDER — TRAZODONE HYDROCHLORIDE 100 MG/1
100 TABLET ORAL
Qty: 90 TABLET | Refills: 0 | Status: SHIPPED | OUTPATIENT
Start: 2024-02-07

## 2024-02-12 ENCOUNTER — TELEPHONE (OUTPATIENT)
Dept: GASTROENTEROLOGY | Facility: CLINIC | Age: 39
End: 2024-02-12

## 2024-02-12 ENCOUNTER — OFFICE VISIT (OUTPATIENT)
Dept: GASTROENTEROLOGY | Facility: CLINIC | Age: 39
End: 2024-02-12
Payer: COMMERCIAL

## 2024-02-12 VITALS
DIASTOLIC BLOOD PRESSURE: 90 MMHG | WEIGHT: 287 LBS | BODY MASS INDEX: 50.85 KG/M2 | HEIGHT: 63 IN | SYSTOLIC BLOOD PRESSURE: 140 MMHG

## 2024-02-12 DIAGNOSIS — D50.9 IRON DEFICIENCY ANEMIA, UNSPECIFIED IRON DEFICIENCY ANEMIA TYPE: ICD-10-CM

## 2024-02-12 PROCEDURE — 99203 OFFICE O/P NEW LOW 30 MIN: CPT | Performed by: INTERNAL MEDICINE

## 2024-02-12 NOTE — PATIENT INSTRUCTIONS
Fiber Supplementation Instructions     Soluble fiber (such as Benefiber®) is easy to incorporate into your diet as it is tasteless and can be mixed with food or drink. Begin with 1 tbsp once a day and then gradually increase to 1 tbsp 3 times a day over the period of a few days. Stir Benefiber® into 4-8 ounces of liquid (carbonated beverages are not recommended) or mix with soft food (hot or cold). Stir well until dissolved. Increase your fluid intake as necessary to ensure you are drinking 7-8 glasses of water every day. Supplemental fiber should be taken with meals for greatest benefit. Many less-expensive generic versions of Benefiber® are available with similar active components (pictured below)      Psyllium husk is also another type of fiber supplementation that you can try as well.  Below is a recommended brand that is easy to take as it is capsules.  Take 3 capsules with 8 oz glass of liquid, 2 to 3 times daily.  You may want to start off once a day with 3 capsules and work your way up to with 3 capsules 2-3 times a day.

## 2024-02-12 NOTE — PROGRESS NOTES
[facility] - Outpatient Consultation  Susan Barrett 38 y.o. female MRN: [id number]  Encounter: [id number]    ASSESSMENT AND PLAN:    1. Iron deficiency anemia, unspecified iron deficiency anemia type  -     Ambulatory referral to Gastroenterology  -     Colonoscopy; Future; Expected date: 02/12/2024  -     EGD; Future; Expected date: 02/12/2024      Iron deficiency anemia.  The patient with lab works is indicative of iron deficiency anemia with low ferritin and iron saturation. No overt signs of GI bleeding. She has a little bit of constipation, which we will treat with fiber therapy. She also has menorrhagia, which she is being evaluated for as well. She does have a pertinent family history of colon cancer in her father who was diagnosed at the age of 50 years-old. She also has intermittent right upper quadrant abdominal discomfort. We will plan to evaluate with EGD and colonoscopy to further evaluate and see if there is any occult GI blood loss causing her iron deficiency anemia. I will need to rule out ulcerative or erosive disease with EGD as well.    ---    Chief Complaint   Patient presents with   • Anemia     Pt going to get transfusions, dad had rectal cancer and primary wants pt to have colon       HPI:   Susan Barrett is a 38 y.o. year old female Susan Barrett is a 38-year-old female who is presenting as a consultation from GRACIELA Guthrie for iron deficiency anemia. She does have a history of menorrhagia. She has a paternal family history of colon cancer. She is accompanied by her .    She has been suffering from chronic neuropathic pain for the past 6 years. She suspects the pain originates from her digestive system due to associated discomfort. The pain appears to shift slightly prior to a bowel movement, after which it localizes in the rectum. This pain, described as dull, stabbing, and uncomfortable. Alcohol consumption exacerbates the pain.  Temporary relief is experienced post-defecation and occasionally when lying down. The patient also reports darker stools when experiencing constipation. Persistent symptoms of nausea and vomiting are also present.    She has had lifelong dysphagia but infrequently experiences heartburn. She has regular bowel movements, at least once daily, but the significance of this is unclear.     She reports having narrow stools intermittently, but at times, stool consistency is normal. She uses fatty substances like butter to facilitate bowel movements.    She takes ibuprofen occasionally.    She has not undergone a colonoscopy. Previous requests for the procedure were denied due to her young age.    Her father was diagnosed with colon cancer at the age of 50-year-old. Her mother struggled with GERD and MS.    She had an anaphylactic shock from the CONTRAST of CT scan.    Answers submitted by the patient for this visit:  Abdominal Pain Questionnaire (Submitted on 2/11/2024)  Chief Complaint: Abdominal pain  Chronicity: recurrent  Onset: more than 1 year ago  Onset quality: undetermined  Progression since onset: waxing and waning  Pain location: RUQ, epigastric region, right flank  Pain - numeric: 6/10  Pain quality: aching, dull, sharp  Radiates to: epigastric region, periumbilical region, right flank, perineum  anorexia: Yes  diarrhea: Yes  dysuria: Yes  frequency: Yes  melena: Yes  nausea: Yes  Aggravated by: being still, bowel movement, certain positions, eating  Relieved by: bowel movements, certain positions, movement, passing flatus, recumbency      LAB/RADIOLOGY/ENDOSCOPY RESULTS:   Her most recent blood work demonstrates mild anemia with microcytic indices and undetectable serum iron levels, iron saturation of 7%, serum ferritin less than 1 ng/mL, TIBC 452 mcg/dl, hemoglobin of 11.6 g/dl, MCV of 79 fl. She had a CAT scan, and she had anaphylactic shock from the contrast.    Historical Information   Past Medical History:    Diagnosis Date   • Alcohol withdrawal delirium, acute, hyperactive ([facility]) 2022   • Alcoholism ([facility])    • Allergic    • Anemia 2024   • Anxiety    • Depression    • Disease of thyroid gland    • ETOH abuse    • Hypertension    • Insulin controlled gestational diabetes mellitus (GDM) during pregnancy, antepartum 2019    Metformin dinner/HS insulin  Last Assessment & Plan:  BG log reviewed today:- previous review on Friday with recommendation for increasing HS insulin  Fastings: high normal 1 hour PP: within the desired range   Discussed rationale and recommendation to change current medical therapy as listed:   Bedtime: 18 units Levemir  Continue same dose of Metformin, 1000 mg at dinner.  Denies signs and sympto   • Miscarriage    • Obesity    • Otitis media      Past Surgical History:   Procedure Laterality Date   •  SECTION  2018   •  SECTION  10/20/2017   • DILATION AND CURETTAGE OF UTERUS     • IR LUMBAR PUNCTURE  6/3/2022   • MASTOIDECTOMY     • MASTOIDECTOMY Right    • WISDOM TOOTH EXTRACTION       Social History     Substance and Sexual Activity   Alcohol Use Not Currently    Comment: Quit 2022     Social History     Substance and Sexual Activity   Drug Use Yes   • Types: Marijuana    Comment: social     Social History     Tobacco Use   Smoking Status Every Day   • Current packs/day: 0.00   • Average packs/day: 0.5 packs/day for 10.0 years (5.0 ttl pk-yrs)   • Types: Cigarettes   • Last attempt to quit: 2017   • Years since quittin.1   Smokeless Tobacco Current     Family History   Problem Relation Age of Onset   • Depression Mother    • Multiple sclerosis Mother    • Thyroid disease Mother    • Diabetes Mother            • Hypertension Mother    • COPD Mother    • Colon cancer Father    • Rectal cancer Father 54   • Hyperlipidemia Father    • Hypertension Father    • Alcohol abuse Father    • Cancer Father    • Asthma Sister    • Hypertension  "Sister        Meds/Allergies     Current Outpatient Medications:   •  bisoprolol (ZEBETA) 10 MG tablet  •  busPIRone (BUSPAR) 5 mg tablet  •  fluticasone (FLONASE) 50 mcg/act nasal spray  •  hydrOXYzine pamoate (VISTARIL) 50 mg capsule  •  lamoTRIgine (LaMICtal) 200 MG tablet  •  levothyroxine 50 mcg tablet  •  Multiple Vitamins-Minerals (MULTIVITAMIN ADULT PO)  •  Tranexamic Acid 650 MG TABS  •  traZODone (DESYREL) 100 mg tablet  •  triamcinolone (KENALOG) 0.5 % cream  •  venlafaxine (EFFEXOR-XR) 150 mg 24 hr capsule  •  clindamycin (CLEOCIN) 300 MG capsule  •  naltrexone (REVIA) 50 mg tablet  Allergies   Allergen Reactions   • Bee Venom Anaphylaxis   • Contrast [Iodinated Contrast Media] Anaphylaxis       PHYSICAL EXAM:    Blood pressure 140/90, height 5' 3\" (1.6 m), weight 130 kg (287 lb). Body mass index is 50.84 kg/m².  General Appearance: No apparent distress, cooperative, alert.  Eyes: Anicteric.  Gastrointestinal: Tenderness in the upper right quadrant. Soft, non-distended; normal bowel sounds; no masses, no organomegaly.  Rectal: Deferred.  Musculoskeletal: No edema.  Skin: No jaundice.    OTHER LAB RESULTS:   Lab Results   Component Value Date    WBC 7.0 01/03/2024    WBC 8.6 08/12/2022    WBC 4.55 06/04/2022    HGB 11.6 (L) 01/03/2024    HGB 13.4 08/12/2022    HGB 13.6 06/04/2022    MCV 79.7 (L) 01/03/2024     01/03/2024     08/12/2022     (L) 06/04/2022    INR 1.15 06/04/2022     Lab Results   Component Value Date    K 5.0 01/03/2024     01/03/2024    CO2 28 01/03/2024    BUN 16 01/03/2024    CREATININE 0.67 01/03/2024    GLUCOSE 74 07/02/2021    CALCIUM 9.3 01/03/2024    CORRECTEDCA 9.0 06/04/2022    AST 9 (L) 01/03/2024    AST 10 10/20/2023    AST 14 08/12/2022    ALT 7 01/03/2024    ALT 9 10/20/2023    ALT 12 08/12/2022    ALKPHOS 51 01/03/2024    ALKPHOS 59 10/20/2023    ALKPHOS 46 08/12/2022    EGFR 116 01/03/2024     Lab Results   Component Value Date    IRON 30 (L) " 01/03/2024    TIBC 452 (H) 01/03/2024    FERRITIN <1 (L) 01/03/2024     Lab Results   Component Value Date    LIPASE 200 06/02/2022       OTHER RADIOLOGY RESULTS:   No results found.    Transcribed for Lexx Sal MD, by Angelica Flores on 02/12/24 at 2:04 PM. Powered by [employer].

## 2024-02-12 NOTE — TELEPHONE ENCOUNTER
Scheduled date of combo (as of today):4-24-24  Physician :  Doron  Location:  [facility]  Bowel prep reviewed with patient:Miralax   Instructions reviewed with patient by:candis  Clearances: alfonso Martin  [phone]

## 2024-02-19 ENCOUNTER — TELEPHONE (OUTPATIENT)
Age: 39
End: 2024-02-19

## 2024-02-19 ENCOUNTER — HOSPITAL ENCOUNTER (OUTPATIENT)
Dept: INFUSION CENTER | Facility: HOSPITAL | Age: 39
Discharge: HOME | End: 2024-02-19
Attending: INTERNAL MEDICINE
Payer: COMMERCIAL

## 2024-02-19 VITALS
TEMPERATURE: 97.5 F | SYSTOLIC BLOOD PRESSURE: 144 MMHG | HEART RATE: 80 BPM | OXYGEN SATURATION: 97 % | DIASTOLIC BLOOD PRESSURE: 65 MMHG | RESPIRATION RATE: 16 BRPM

## 2024-02-19 DIAGNOSIS — D50.9 IRON DEFICIENCY ANEMIA, UNSPECIFIED IRON DEFICIENCY ANEMIA TYPE: Primary | ICD-10-CM

## 2024-02-19 PROCEDURE — 96366 THER/PROPH/DIAG IV INF ADDON: CPT

## 2024-02-19 PROCEDURE — 96365 THER/PROPH/DIAG IV INF INIT: CPT

## 2024-02-19 RX ORDER — SODIUM CHLORIDE 9 MG/ML
20 INJECTION, SOLUTION INTRAVENOUS ONCE
Qty: 1000 ML | Refills: 0 | Status: CANCELLED | OUTPATIENT
Start: 2024-02-19

## 2024-02-19 RX ORDER — SODIUM CHLORIDE 9 MG/ML
20 INJECTION, SOLUTION INTRAVENOUS ONCE
Status: CANCELLED | OUTPATIENT
Start: 2024-02-26

## 2024-02-19 RX ORDER — SODIUM CHLORIDE 9 MG/ML
20 INJECTION, SOLUTION INTRAVENOUS ONCE
Status: CANCELLED | OUTPATIENT
Start: 2024-02-19

## 2024-02-19 RX ORDER — SODIUM CHLORIDE 9 MG/ML
20 INJECTION, SOLUTION INTRAVENOUS ONCE
Status: COMPLETED | OUTPATIENT
Start: 2024-02-19 | End: 2024-02-19

## 2024-02-19 RX ADMIN — IRON SUCROSE 300 MG: 20 INJECTION, SOLUTION INTRAVENOUS at 13:02

## 2024-02-19 RX ADMIN — SODIUM CHLORIDE 20 ML/HR: 9 INJECTION, SOLUTION INTRAVENOUS at 13:02

## 2024-02-19 NOTE — PROGRESS NOTES
..Susan Barrett  tolerated treatment well with no complications.      Susan Barrett is aware of future appt on 2/26 at 12:30.     AVS printed and given to Susan Barrett:  Yes  (Declined by Susan Barrett)

## 2024-02-19 NOTE — TELEPHONE ENCOUNTER
Feraheme changed to venofer per insurance preference. Spoke with patient and made her aware. She is agreeable to venofer.

## 2024-02-26 ENCOUNTER — HOSPITAL ENCOUNTER (OUTPATIENT)
Dept: INFUSION CENTER | Facility: HOSPITAL | Age: 39
Discharge: HOME | End: 2024-02-26
Attending: INTERNAL MEDICINE
Payer: COMMERCIAL

## 2024-02-26 DIAGNOSIS — D50.9 IRON DEFICIENCY ANEMIA, UNSPECIFIED IRON DEFICIENCY ANEMIA TYPE: Primary | ICD-10-CM

## 2024-02-26 PROCEDURE — 96365 THER/PROPH/DIAG IV INF INIT: CPT

## 2024-02-26 RX ORDER — SODIUM CHLORIDE 9 MG/ML
20 INJECTION, SOLUTION INTRAVENOUS ONCE
Status: COMPLETED | OUTPATIENT
Start: 2024-02-26 | End: 2024-02-26

## 2024-02-26 RX ORDER — SODIUM CHLORIDE 9 MG/ML
20 INJECTION, SOLUTION INTRAVENOUS ONCE
OUTPATIENT
Start: 2024-03-04

## 2024-02-26 RX ADMIN — IRON SUCROSE 300 MG: 20 INJECTION, SOLUTION INTRAVENOUS at 13:19

## 2024-02-26 RX ADMIN — SODIUM CHLORIDE 20 ML/HR: 9 INJECTION, SOLUTION INTRAVENOUS at 13:19

## 2024-02-26 NOTE — PROGRESS NOTES
Susan Barrett  tolerated treatment well with no complications.      Susan Barrett is aware of future appt on 3/4 at 1300.     AVS printed and given to Susan Barrett:  No (Declined by Susan Barrett)

## 2024-03-04 ENCOUNTER — HOSPITAL ENCOUNTER (OUTPATIENT)
Dept: INFUSION CENTER | Facility: HOSPITAL | Age: 39
Discharge: HOME | End: 2024-03-04
Attending: INTERNAL MEDICINE
Payer: COMMERCIAL

## 2024-03-04 ENCOUNTER — DOCUMENTATION (OUTPATIENT)
Dept: HEMATOLOGY ONCOLOGY | Facility: CLINIC | Age: 39
End: 2024-03-04

## 2024-03-04 VITALS
TEMPERATURE: 97.1 F | HEART RATE: 77 BPM | SYSTOLIC BLOOD PRESSURE: 129 MMHG | OXYGEN SATURATION: 96 % | DIASTOLIC BLOOD PRESSURE: 87 MMHG | RESPIRATION RATE: 17 BRPM

## 2024-03-04 DIAGNOSIS — D50.9 IRON DEFICIENCY ANEMIA, UNSPECIFIED IRON DEFICIENCY ANEMIA TYPE: Primary | ICD-10-CM

## 2024-03-04 PROCEDURE — 96365 THER/PROPH/DIAG IV INF INIT: CPT

## 2024-03-04 PROCEDURE — 96366 THER/PROPH/DIAG IV INF ADDON: CPT

## 2024-03-04 RX ORDER — SODIUM CHLORIDE 9 MG/ML
20 INJECTION, SOLUTION INTRAVENOUS ONCE
Status: COMPLETED | OUTPATIENT
Start: 2024-03-04 | End: 2024-03-04

## 2024-03-04 RX ORDER — SODIUM CHLORIDE 9 MG/ML
20 INJECTION, SOLUTION INTRAVENOUS ONCE
Status: CANCELLED | OUTPATIENT
Start: 2024-03-11

## 2024-03-04 RX ADMIN — SODIUM CHLORIDE 20 ML/HR: 9 INJECTION, SOLUTION INTRAVENOUS at 13:05

## 2024-03-04 RX ADMIN — IRON SUCROSE 300 MG: 20 INJECTION, SOLUTION INTRAVENOUS at 13:18

## 2024-03-04 NOTE — PROGRESS NOTES
Called pt to go over her ins benefits. Got her voicemail left a me a message for pt to call me back

## 2024-03-04 NOTE — PROGRESS NOTES
Pt tolerated venofer infusion with no adverse reactions. IV removed. Pt left ambulatory with steady gait. Declined AVS     Aware of next appt on 03/11/2024 @ 0200 pm

## 2024-03-05 ENCOUNTER — TELEPHONE (OUTPATIENT)
Dept: SURGERY | Facility: CLINIC | Age: 39
End: 2024-03-05

## 2024-03-05 NOTE — TELEPHONE ENCOUNTER
Note to chart:    Pt canceled her 3/4 appt w/LINDA Newby. Note came through [employer] without any request to reschedule. (Pt was coming in due to her belly button leaking.)     Left message for pt asking her to contact the office to advise if she wants to reschedule or not.

## 2024-03-07 ENCOUNTER — DOCUMENTATION (OUTPATIENT)
Dept: HEMATOLOGY ONCOLOGY | Facility: CLINIC | Age: 39
End: 2024-03-07

## 2024-03-07 NOTE — PROGRESS NOTES
2ND ATTEMPT  Called pt to go over her ins benefits got her voicemail left a message for pt to call me back

## 2024-03-11 ENCOUNTER — HOSPITAL ENCOUNTER (OUTPATIENT)
Dept: INFUSION CENTER | Facility: HOSPITAL | Age: 39
Discharge: HOME | End: 2024-03-11
Attending: INTERNAL MEDICINE
Payer: COMMERCIAL

## 2024-03-11 VITALS
RESPIRATION RATE: 16 BRPM | HEART RATE: 73 BPM | OXYGEN SATURATION: 96 % | TEMPERATURE: 96.6 F | SYSTOLIC BLOOD PRESSURE: 124 MMHG | DIASTOLIC BLOOD PRESSURE: 83 MMHG

## 2024-03-11 DIAGNOSIS — D50.9 IRON DEFICIENCY ANEMIA, UNSPECIFIED IRON DEFICIENCY ANEMIA TYPE: Primary | ICD-10-CM

## 2024-03-11 PROCEDURE — 96365 THER/PROPH/DIAG IV INF INIT: CPT

## 2024-03-11 PROCEDURE — 96366 THER/PROPH/DIAG IV INF ADDON: CPT

## 2024-03-11 RX ORDER — SODIUM CHLORIDE 9 MG/ML
20 INJECTION, SOLUTION INTRAVENOUS ONCE
Status: CANCELLED | OUTPATIENT
Start: 2024-03-11

## 2024-03-11 RX ORDER — SODIUM CHLORIDE 9 MG/ML
20 INJECTION, SOLUTION INTRAVENOUS ONCE
Status: COMPLETED | OUTPATIENT
Start: 2024-03-11 | End: 2024-03-11

## 2024-03-11 RX ADMIN — IRON SUCROSE 300 MG: 20 INJECTION, SOLUTION INTRAVENOUS at 12:12

## 2024-03-11 RX ADMIN — SODIUM CHLORIDE 20 ML/HR: 9 INJECTION, SOLUTION INTRAVENOUS at 12:12

## 2024-03-11 NOTE — PROGRESS NOTES
Patient Venofer infusion completed without complication. Patient declined AVS at this time and confirmed next appointment with the office. Patient discharged in stable condition to home via ambulation.

## 2024-03-15 ENCOUNTER — DOCUMENTATION (OUTPATIENT)
Dept: HEMATOLOGY ONCOLOGY | Facility: CLINIC | Age: 39
End: 2024-03-15

## 2024-03-15 NOTE — PROGRESS NOTES
3RD ATTEMPT  Called pt to go over her ins benefits. Got her voicemail left a message for pt to call me back

## 2024-03-27 DIAGNOSIS — E03.9 HYPOTHYROIDISM, UNSPECIFIED TYPE: ICD-10-CM

## 2024-03-27 RX ORDER — LEVOTHYROXINE SODIUM 0.05 MG/1
50 TABLET ORAL DAILY
Qty: 90 TABLET | Refills: 0 | Status: SHIPPED | OUTPATIENT
Start: 2024-03-27

## 2024-04-24 ENCOUNTER — HOSPITAL ENCOUNTER (OUTPATIENT)
Dept: GASTROENTEROLOGY | Facility: HOSPITAL | Age: 39
Setting detail: OUTPATIENT SURGERY
Discharge: HOME | End: 2024-04-24
Attending: INTERNAL MEDICINE

## 2024-04-24 DIAGNOSIS — D50.9 IRON DEFICIENCY ANEMIA, UNSPECIFIED IRON DEFICIENCY ANEMIA TYPE: ICD-10-CM

## 2024-05-06 DIAGNOSIS — F41.1 GENERALIZED ANXIETY DISORDER: ICD-10-CM

## 2024-05-06 RX ORDER — TRAZODONE HYDROCHLORIDE 100 MG/1
100 TABLET ORAL
Qty: 90 TABLET | Refills: 1 | Status: SHIPPED | OUTPATIENT
Start: 2024-05-06

## 2024-06-03 ENCOUNTER — TELEPHONE (OUTPATIENT)
Age: 39
End: 2024-06-03

## 2024-06-03 NOTE — TELEPHONE ENCOUNTER
Left message reminding patient to have labs completed for upcoming appointment 6/6/24.  Asked patient to return call to the office to let us know which lab she uses.  Provided call back telephone number [phone]

## 2024-06-04 NOTE — TELEPHONE ENCOUNTER
Left message reminding patient to have labs completed for upcoming appointment. Asked patient to return call to the office to verify which lab she uses. Provided call back telephone number.

## 2024-06-11 ENCOUNTER — TELEPHONE (OUTPATIENT)
Dept: GASTROENTEROLOGY | Facility: CLINIC | Age: 39
End: 2024-06-11

## 2024-06-30 DIAGNOSIS — E03.9 HYPOTHYROIDISM, UNSPECIFIED TYPE: ICD-10-CM

## 2024-06-30 RX ORDER — LEVOTHYROXINE SODIUM 0.05 MG/1
50 TABLET ORAL DAILY
Qty: 90 TABLET | Refills: 1 | Status: SHIPPED | OUTPATIENT
Start: 2024-06-30

## 2024-07-02 NOTE — TELEPHONE ENCOUNTER
Dr Sal-pt was contacted to reschedule no-show combo ordered from last ov with no response.  Please advise-thank you

## 2024-07-11 ENCOUNTER — PATIENT OUTREACH (OUTPATIENT)
Dept: OBGYN CLINIC | Facility: CLINIC | Age: 39
End: 2024-07-11

## 2024-07-11 ENCOUNTER — TELEPHONE (OUTPATIENT)
Age: 39
End: 2024-07-11

## 2024-07-11 ENCOUNTER — OFFICE VISIT (OUTPATIENT)
Dept: OBGYN CLINIC | Facility: CLINIC | Age: 39
End: 2024-07-11
Payer: COMMERCIAL

## 2024-07-11 VITALS
SYSTOLIC BLOOD PRESSURE: 128 MMHG | WEIGHT: 278.8 LBS | HEIGHT: 63 IN | DIASTOLIC BLOOD PRESSURE: 86 MMHG | BODY MASS INDEX: 49.4 KG/M2

## 2024-07-11 DIAGNOSIS — O36.80X0 PREGNANCY WITH UNCERTAIN FETAL VIABILITY, SINGLE OR UNSPECIFIED FETUS: ICD-10-CM

## 2024-07-11 DIAGNOSIS — F41.1 GENERALIZED ANXIETY DISORDER: Primary | ICD-10-CM

## 2024-07-11 DIAGNOSIS — F39 MOOD DISORDER (HCC): ICD-10-CM

## 2024-07-11 PROCEDURE — 99214 OFFICE O/P EST MOD 30 MIN: CPT | Performed by: OBSTETRICS & GYNECOLOGY

## 2024-07-11 NOTE — ASSESSMENT & PLAN NOTE
I reviewed my concerns about untreated mental health disorders in pregnancy and that they can be exacerbated due to pregnancy hormones. I recommend a psychiatry consult to see if she can be restarted on some of her meds. She reports therapy is too expensive for her and offered resources to see if her insurance will cover any of them. In addition I have placed a social work consult to see if any other resources can be provided for the patient.

## 2024-07-11 NOTE — TELEPHONE ENCOUNTER
Patient called back to let Dr. Barlow know she spoke to Dr. Joshi regarding progesterone - he said her levels were low each time she miscarried so that's why he wanted her on it asap when she gets pregnant.

## 2024-07-11 NOTE — PROGRESS NOTES
[facility] - [place]  [address], [place] [address]    Assessment/Plan:  1. Generalized anxiety disorder  -     Ambulatory Referral to Social Work Care Management Program; Future  2. Mood disorder (HCC)  Assessment & Plan:  I reviewed my concerns about untreated mental health disorders in pregnancy and that they can be exacerbated due to pregnancy hormones. I recommend a psychiatry consult to see if she can be restarted on some of her meds. She reports therapy is too expensive for her and offered resources to see if her insurance will cover any of them. In addition I have placed a social work consult to see if any other resources can be provided for the patient.    Orders:  -     Ambulatory referral to Psych Services; Future  -     Ambulatory Referral to Social Work Care Management Program; Future  3. Pregnancy with uncertain fetal viability, single or unspecified fetus  Assessment & Plan:  I reviewed there is no evidence based recommendations on just starting progesterone for history of miscarriage. I discussed no strong studies about being on progesterone to prevent miscarriage. We will start checking hcg with repeat in 48 hours. I did order progesterone levels and discussed we do not routinely check them as well. I also discussed this is a high risk pregnancy. She does have an 8 week viability scan set up.   Orders:  -     hCG, quantitative; Standing  -     Progesterone; Standing  -     hCG, quantitative  -     Progesterone  -     TSH, 3rd generation with Free T4 reflex; Future  -     TSH, 3rd generation with Free T4 reflex      Subjective:   Susan Barrett is a 39 y.o.  .  CC:   Chief Complaint   Patient presents with    Consult     Pt states she wants to discuss taking progesterone , she took a pregnancy test this morning and it was positive and due to her previous losses her previous OB GYN would prescribed her the progesterone .        HPI: 40yo female presents with a positive pregnancy  test. She reports a history of miscarriages in the past. Her previous Obgyn placed her on progesterone as soon as she had a positive pregnancy test to prevent miscarriage. She is unsure if she had an APL workup due to history of miscarriages.     In addition, she reports her mental health is suffering. She was on lamictal however the facility in which she got it has closed down. She is in recovery from alcohol addiction and reports she is not drinking. She has not seen her PCP in awhile due to having a misunderstanding with them and now does not feel comfortable going there. So her TSH has not been checked in some time.     ROS: Negative except as noted in HPI    Patient's last menstrual period was 2024 (exact date).       She  reports being sexually active and has had partner(s) who are male. She reports using the following methods of birth control/protection: Coitus interruptus and None.       The following portions of the patient's history were reviewed and updated as appropriate:   Past Medical History:   Diagnosis Date    Alcohol withdrawal delirium, acute, hyperactive (HCC) 2022    Alcoholism (HCC)     Allergic     Anemia 2024    Anxiety     Depression     Disease of thyroid gland     ETOH abuse     Hypertension     Insulin controlled gestational diabetes mellitus (GDM) during pregnancy, antepartum 2019    Metformin dinner/HS insulin  Last Assessment & Plan:  BG log reviewed today:- previous review on Friday with recommendation for increasing HS insulin  Fastings: high normal 1 hour PP: within the desired range   Discussed rationale and recommendation to change current medical therapy as listed:   Bedtime: 18 units Levemir  Continue same dose of Metformin, 1000 mg at dinner.  Denies signs and sympto    Miscarriage     Obesity     Otitis media     Panic disorder      Past Surgical History:   Procedure Laterality Date     SECTION  2018     SECTION  10/20/2017     DILATION AND CURETTAGE OF UTERUS      IR LUMBAR PUNCTURE  6/3/2022    MASTOIDECTOMY  2013    MASTOIDECTOMY Right     WISDOM TOOTH EXTRACTION       Family History   Problem Relation Age of Onset    Depression Mother     Multiple sclerosis Mother     Thyroid disease Mother     Diabetes Mother             Hypertension Mother     COPD Mother     Colon cancer Father     Rectal cancer Father 54    Hyperlipidemia Father     Hypertension Father     Alcohol abuse Father     Cancer Father     Asthma Sister     Hypertension Sister      Social History     Socioeconomic History    Marital status: /Civil Union     Spouse name: None    Number of children: None    Years of education: None    Highest education level: None   Occupational History    None   Tobacco Use    Smoking status: Every Day     Current packs/day: 0.00     Average packs/day: 0.5 packs/day for 10.0 years (5.0 ttl pk-yrs)     Types: Cigarettes     Last attempt to quit: 2017     Years since quittin.5    Smokeless tobacco: Current   Vaping Use    Vaping status: Some Days    Substances: THC   Substance and Sexual Activity    Alcohol use: Not Currently     Comment: Quit 2022    Drug use: Yes     Types: Marijuana     Comment: socially    Sexual activity: Yes     Partners: Male     Birth control/protection: Coitus interruptus, None   Other Topics Concern    None   Social History Narrative    None     Social Determinants of Health     Financial Resource Strain: Not on file   Food Insecurity: No Food Insecurity (6/3/2022)    Hunger Vital Sign     Worried About Running Out of Food in the Last Year: Never true     Ran Out of Food in the Last Year: Never true   Transportation Needs: No Transportation Needs (6/3/2022)    PRAPARE - Transportation     Lack of Transportation (Medical): No     Lack of Transportation (Non-Medical): No   Physical Activity: Not on file   Stress: Not on file   Social Connections: Not on file   Intimate Partner Violence:  "Not on file   Housing Stability: Low Risk  (6/3/2022)    Housing Stability Vital Sign     Unable to Pay for Housing in the Last Year: No     Number of Places Lived in the Last Year: 1     Unstable Housing in the Last Year: No     Outpatient Medications Marked as Taking for the 7/11/24 encounter (Office Visit) with Abner LARA DO   Medication    fluticasone (FLONASE) 50 mcg/act nasal spray    hydrOXYzine pamoate (VISTARIL) 50 mg capsule    levothyroxine 50 mcg tablet    Multiple Vitamins-Minerals (MULTIVITAMIN ADULT PO)    Tranexamic Acid 650 MG TABS    traZODone (DESYREL) 100 mg tablet    venlafaxine (EFFEXOR-XR) 150 mg 24 hr capsule     Allergies   Allergen Reactions    Bee Venom Anaphylaxis    Contrast [Iodinated Contrast Media] Anaphylaxis           Objective:  /86 (BP Location: Left arm, Patient Position: Sitting, Cuff Size: Standard)   Ht 5' 3\" (1.6 m)   Wt 126 kg (278 lb 12.8 oz)   LMP 06/03/2024 (Exact Date)   Breastfeeding No   BMI 49.39 kg/m²          General Appearance: alert and oriented, in no acute distress.   Extremities: Normal range of motion.   Skin: normal, no rash or abnormalities  Neurologic: alert, oriented x3  Psychiatric: Appropriate affect, mood stable, cooperative with exam.    I have spent a total time of 30 minutes in caring for this patient on the day of the visit/encounter including Prognosis, Risks and benefits of tx options, Instructions for management, Counseling / Coordination of care, Documenting in the medical record, Reviewing / ordering tests, medicine, procedures  , and Obtaining or reviewing history  .            Abner Barlow DO  7/11/2024 11:03 AM   "

## 2024-07-11 NOTE — ASSESSMENT & PLAN NOTE
I reviewed there is no evidence based recommendations on just starting progesterone for history of miscarriage. I discussed no strong studies about being on progesterone to prevent miscarriage. We will start checking hcg with repeat in 48 hours. I did order progesterone levels and discussed we do not routinely check them as well. I also discussed this is a high risk pregnancy. She does have an 8 week viability scan set up.

## 2024-07-11 NOTE — PROGRESS NOTES
RICHARD referred by Dr. Barlow for mental health support/resources. Dr. Barlow sent SW a message stating that patient is newly pregnancy (D&V appt on 7/31) however patient is being recommended for therapy/med management.     RICHARD reviewed chart - referral was placed by Dr. Barlow for  [facility], who sent patient a [employer] message for information needed for wait list. SW called patient to check in - patient was working and stated she would have better availability tomorrow. Patient confirmed she would fill out referral form for [facility]. SW to f/u with patient tomorrow.

## 2024-07-12 ENCOUNTER — PATIENT OUTREACH (OUTPATIENT)
Dept: OBGYN CLINIC | Facility: CLINIC | Age: 39
End: 2024-07-12

## 2024-07-12 LAB
B-HCG SERPL-ACNC: 2448 MIU/ML
PROGEST SERPL-MCNC: 14 NG/ML
TSH SERPL-ACNC: 1.43 MIU/L

## 2024-07-12 NOTE — PROGRESS NOTES
RICHARD called patient back today to discuss resources. No answer. RICHARD left  requesting a call back. Patient's next appointment is scheduled for 7:45am on 7/31/24 at [facility].

## 2024-07-19 ENCOUNTER — PATIENT OUTREACH (OUTPATIENT)
Dept: OBGYN CLINIC | Facility: CLINIC | Age: 39
End: 2024-07-19

## 2024-07-19 NOTE — PROGRESS NOTES
Second attempt to reach patient to discuss mental health resources - no answer. Unable to leave  as mailbox is full. SW sent [employer] message requesting a call back. Patient's next appointment is scheduled for 7:45am on 7/31/24 at [facility].

## 2024-07-31 ENCOUNTER — ULTRASOUND (OUTPATIENT)
Dept: OBGYN CLINIC | Facility: CLINIC | Age: 39
End: 2024-07-31
Payer: COMMERCIAL

## 2024-07-31 VITALS
HEIGHT: 64 IN | DIASTOLIC BLOOD PRESSURE: 78 MMHG | WEIGHT: 275.8 LBS | SYSTOLIC BLOOD PRESSURE: 126 MMHG | BODY MASS INDEX: 47.08 KG/M2

## 2024-07-31 DIAGNOSIS — F10.10 ETOH ABUSE: ICD-10-CM

## 2024-07-31 DIAGNOSIS — N91.2 AMENORRHEA: Primary | ICD-10-CM

## 2024-07-31 PROCEDURE — 76801 OB US < 14 WKS SINGLE FETUS: CPT | Performed by: NURSE PRACTITIONER

## 2024-07-31 PROCEDURE — 99213 OFFICE O/P EST LOW 20 MIN: CPT | Performed by: NURSE PRACTITIONER

## 2024-07-31 NOTE — PROGRESS NOTES
[facility] - [place]  [address] [place] [id number]    Assessment/Plan:  1. Amenorrhea  -     AMB  OB < 14 weeks single or first gestation level 1  -     Ambulatory Referral to Maternal Fetal Medicine; Future; Expected date: 2024  SAMIR 3/17/2025, 7 weeks 2 days gestation by today's ultrasound  9 weeks 2 days by LMP, irregular menses  Return visit in 1 week for reassurance due to dating discrepancy and patient with severe anxiety.  Recommend discontinuing medical marijuana, discussed unknown and effects on developing fetus as well as possible increased risk for growth concerns,  birth.   Discussed scheduling appointment with psychiatry to assist with anxiety, medications, patient agrees and will call.   Pt is to call the office with any concerns, questions, pt understands and agrees.     Subjective:   Susan Barrett is a 39 y.o.  female.    HPI:   39 year old  presents for office visit with amenorrhea and positive testing for pregnancy. Previously had serial bhcg which was rising as expected. Pt extremely anxious, reports long history of mental kush concerns, unplanned pregnancy. Referred to psychiatry, has not called for an appointment yet. In recovery for ETOH abuse x 1 year, currently using medical marijuana. Has supportive . Pt reports LMP , with irregular cycles, placing her at 9 weeks 2 days of gestation. She is not having any pain or bleeding.         Gyn History  Patient's last menstrual period was 2024.       Last pap smear: 2022    She  reports being sexually active and has had partner(s) who are male. She reports using the following method of birth control/protection: Coitus interruptus.       OB History      Past Medical History:  2022: Alcohol withdrawal delirium, acute, hyperactive (HCC)  No date: Alcoholism (HCC)  No date: Allergic  2024: Anemia  No date: Anxiety  No date: Depression  No date: Disease of thyroid gland  No  date: ETOH abuse  No date: Hypertension  No date: Hyperthyroidism  2019: Insulin controlled gestational diabetes mellitus (GDM)   during pregnancy, antepartum      Comment:  Metformin dinner/HS insulin  Last Assessment & Plan:  BG               log reviewed today:- previous review on Friday with                recommendation for increasing HS insulin  Fastings: high                normal 1 hour PP: within the desired range   Discussed                rationale and recommendation to change current medical                therapy as listed:   Bedtime: 18 units Levemir  Continue                same dose of Metformin, 1000 mg at dinner.  Denies signs                and sympto  No date: Miscarriage  No date: Obesity  No date: Otitis media  No date: Panic disorder     Past Surgical History:  2018:  SECTION  10/20/2017:  SECTION  No date: DILATION AND CURETTAGE OF UTERUS  6/3/2022: IR LUMBAR PUNCTURE  2013: MASTOIDECTOMY  No date: MASTOIDECTOMY; Right  No date: WISDOM TOOTH EXTRACTION     Social History     Tobacco Use    Smoking status: Former     Current packs/day: 0.00     Average packs/day: 0.5 packs/day for 10.0 years (5.0 ttl pk-yrs)     Types: Cigarettes     Quit date: 2017     Years since quittin.5    Smokeless tobacco: Never   Vaping Use    Vaping status: Some Days    Substances: THC   Substance Use Topics    Alcohol use: Not Currently     Comment: Quit 2022    Drug use: Yes     Types: Marijuana     Comment: medical          Current Outpatient Medications:     bisoprolol (ZEBETA) 10 MG tablet, Take 1 tablet (10 mg total) by mouth daily, Disp: 30 tablet, Rfl: 5    fluticasone (FLONASE) 50 mcg/act nasal spray, 1 spray into each nostril daily, Disp: , Rfl:     hydrOXYzine pamoate (VISTARIL) 50 mg capsule, TAKE 1 CAPSULE BY MOUTH EVERY DAY AS NEEDED NO MORE THAN 6 CAPSULES IN 24 HOURS, Disp: , Rfl:     levothyroxine 50 mcg tablet, TAKE 1 TABLET BY MOUTH EVERY DAY, Disp: 90  "tablet, Rfl: 1    Multiple Vitamins-Minerals (MULTIVITAMIN ADULT PO), Take 1 tablet by mouth daily, Disp: , Rfl:     traZODone (DESYREL) 100 mg tablet, TAKE 1 TABLET BY MOUTH EVERYDAY AT BEDTIME, Disp: 90 tablet, Rfl: 1    venlafaxine (EFFEXOR-XR) 150 mg 24 hr capsule, TAKE 1 CAPSULE BY MOUTH EVERY DAY, Disp: 90 capsule, Rfl: 1    Tranexamic Acid 650 MG TABS, Take 2 tablets (1,300 mg total) by mouth 3 (three) times a day During heavy days of menstrual cycle, maximum 5 days of use per month (Patient not taking: Reported on 7/31/2024), Disp: 18 tablet, Rfl: 3    She is allergic to bee venom and contrast [iodinated contrast media]..    ROS: Review of Systems See HPI for details, otherwise negative    Objective:  /78 (BP Location: Left arm, Patient Position: Sitting, Cuff Size: Large)   Ht 5' 3.75\" (1.619 m)   Wt 125 kg (275 lb 12.8 oz)   LMP 05/27/2024   BMI 47.71 kg/m²      Physical Exam  Constitutional:       General: She is not in acute distress.     Appearance: Normal appearance.   Abdominal:      General: There is no distension.      Palpations: Abdomen is soft.      Tenderness: There is no abdominal tenderness.   Skin:     General: Skin is warm and dry.   Neurological:      Mental Status: She is alert.   Psychiatric:         Thought Content: Thought content normal.       Transvaginal and transabdominal ultrasound performed, better imaging obtained transabdominally. SIUP identified measuring 7 weeks 2 days by CRL, +CM at 153 bpm. No adnexal masses identified.   "

## 2024-08-07 ENCOUNTER — HOSPITAL ENCOUNTER (OUTPATIENT)
Dept: ULTRASOUND IMAGING | Facility: HOSPITAL | Age: 39
Discharge: HOME | End: 2024-08-07
Payer: COMMERCIAL

## 2024-08-07 ENCOUNTER — ULTRASOUND (OUTPATIENT)
Dept: OBGYN CLINIC | Facility: CLINIC | Age: 39
End: 2024-08-07
Payer: COMMERCIAL

## 2024-08-07 VITALS
SYSTOLIC BLOOD PRESSURE: 118 MMHG | DIASTOLIC BLOOD PRESSURE: 82 MMHG | HEIGHT: 64 IN | BODY MASS INDEX: 46.78 KG/M2 | WEIGHT: 274 LBS

## 2024-08-07 DIAGNOSIS — N91.2 AMENORRHEA: ICD-10-CM

## 2024-08-07 DIAGNOSIS — N91.2 AMENORRHEA: Primary | ICD-10-CM

## 2024-08-07 DIAGNOSIS — O03.9 MISCARRIAGE: Primary | ICD-10-CM

## 2024-08-07 PROCEDURE — 76817 TRANSVAGINAL US OBSTETRIC: CPT | Performed by: NURSE PRACTITIONER

## 2024-08-07 PROCEDURE — 99213 OFFICE O/P EST LOW 20 MIN: CPT | Performed by: NURSE PRACTITIONER

## 2024-08-07 PROCEDURE — 76801 OB US < 14 WKS SINGLE FETUS: CPT

## 2024-08-07 RX ORDER — MULTIVITAMIN WITH IRON
100 TABLET ORAL DAILY
COMMUNITY

## 2024-08-07 NOTE — PROGRESS NOTES
Reviewed ultrasound findings with pt, pregnancy failure. Discussed options, patient is certain she would like to have a D and E, based on previous experience. Will have office call patient to schedule consult asap. Call with any concerns in the meantime. Order placed for type and screen.

## 2024-08-07 NOTE — PROGRESS NOTES
Ultrasound Probe Disinfection    A transvaginal ultrasound was performed.   Prior to use, disinfection was performed with High Level Disinfection Process ([employer])  Probe serial number [id number]:  [id number] was used    Mariana Alaniz  08/07/24  10:40 AM

## 2024-08-07 NOTE — PROGRESS NOTES
[facility] - [place]  [address] [place] [address]    Assessment/Plan:  1. Amenorrhea  -     US OB < 14 weeks with transvaginal; Future; Expected date: 2024  -     AMB US OB < 14 weeks single or first gestation level 1  IUP with questionable viability  Previously viable gestation noted on 2024 measuring 7 weeks 2 days, +CM, dating 2 weeks behind LMP.   Transvaginal ultrasound demonstrates IUP measuring 7 weeks 1 day, very poor quality imaging, difficult scan. No yolk sac or cardiac activity identified on today's exam.  Discussed unclear findings with pt.  Pt sent to Radiology for ultrasound, scheduled by office. On call Dr. Lee notified.   Pt left office prior to US scheduling, felt panicked and needed to leave.   US appointment time 9:45 communicated to pt via telephone following her visit.       Subjective:   Susan Barrett is a 39 y.o.  female.    HPI:   39 year old  presents for follow up ultrasound. In office ultrasound on 2024 demonstrated a SIUP, +CM, measuring 7 weeks 2 days. Pt was 9 weeks 2 days gestation at that time by LMP .  Pt denies pain or bleeding, reporting nausea. Emotionally anxious, reports good support from .         Gyn History  Patient's last menstrual period was 2024.       Last pap smear: 2022    She  reports being sexually active and has had partner(s) who are male. She reports using the following method of birth control/protection: Coitus interruptus.       OB History      Past Medical History:  2022: Alcohol withdrawal delirium, acute, hyperactive (HCC)  No date: Alcoholism (HCC)  No date: Allergic  2024: Anemia  No date: Anxiety  No date: Depression  No date: Disease of thyroid gland  No date: ETOH abuse  No date: Hypertension  No date: Hyperthyroidism  2019: Insulin controlled gestational diabetes mellitus (GDM)   during pregnancy, antepartum      Comment:  Metformin dinner/HS insulin  Last  Assessment & Plan:  BG               log reviewed today:- previous review on Friday with                recommendation for increasing HS insulin  Fastings: high                normal 1 hour PP: within the desired range   Discussed                rationale and recommendation to change current medical                therapy as listed:   Bedtime: 18 units Levemir  Continue                same dose of Metformin, 1000 mg at dinner.  Denies signs                and sympto  No date: Miscarriage  No date: Obesity  No date: Otitis media  No date: Panic disorder     Past Surgical History:  2018:  SECTION  10/20/2017:  SECTION  No date: DILATION AND CURETTAGE OF UTERUS  6/3/2022: IR LUMBAR PUNCTURE  2013: MASTOIDECTOMY  No date: MASTOIDECTOMY; Right  No date: WISDOM TOOTH EXTRACTION     Social History     Tobacco Use    Smoking status: Former     Current packs/day: 0.00     Average packs/day: 0.5 packs/day for 10.0 years (5.0 ttl pk-yrs)     Types: Cigarettes     Quit date: 2017     Years since quittin.6    Smokeless tobacco: Never   Vaping Use    Vaping status: Some Days    Substances: THC   Substance Use Topics    Alcohol use: Not Currently     Comment: Quit 2022    Drug use: Yes     Types: Marijuana     Comment: medical          Current Outpatient Medications:     bisoprolol (ZEBETA) 10 MG tablet, Take 1 tablet (10 mg total) by mouth daily, Disp: 30 tablet, Rfl: 5    Doxylamine Succinate, Sleep, (UNISOM PO), Take by mouth, Disp: , Rfl:     fluticasone (FLONASE) 50 mcg/act nasal spray, 1 spray into each nostril daily, Disp: , Rfl:     hydrOXYzine pamoate (VISTARIL) 50 mg capsule, TAKE 1 CAPSULE BY MOUTH EVERY DAY AS NEEDED NO MORE THAN 6 CAPSULES IN 24 HOURS, Disp: , Rfl:     levothyroxine 50 mcg tablet, TAKE 1 TABLET BY MOUTH EVERY DAY, Disp: 90 tablet, Rfl: 1    Multiple Vitamins-Minerals (MULTIVITAMIN ADULT PO), Take 1 tablet by mouth daily, Disp: , Rfl:     pyridoxine (VITAMIN B6)  "100 mg tablet, Take 100 mg by mouth daily, Disp: , Rfl:     Tranexamic Acid 650 MG TABS, Take 2 tablets (1,300 mg total) by mouth 3 (three) times a day During heavy days of menstrual cycle, maximum 5 days of use per month, Disp: 18 tablet, Rfl: 3    traZODone (DESYREL) 100 mg tablet, TAKE 1 TABLET BY MOUTH EVERYDAY AT BEDTIME, Disp: 90 tablet, Rfl: 1    venlafaxine (EFFEXOR-XR) 150 mg 24 hr capsule, TAKE 1 CAPSULE BY MOUTH EVERY DAY, Disp: 90 capsule, Rfl: 1    She is allergic to bee venom and contrast [iodinated contrast media]..    ROS: Review of Systems    Objective:  /82 (BP Location: Left arm, Patient Position: Sitting, Cuff Size: Large)   Ht 5' 3.75\" (1.619 m)   Wt 124 kg (274 lb)   LMP 05/27/2024   BMI 47.40 kg/m²      Physical Exam  Constitutional:       Appearance: Normal appearance.   Abdominal:      Palpations: Abdomen is soft.      Tenderness: There is no abdominal tenderness.   Skin:     General: Skin is warm and dry.   Neurological:      Mental Status: She is alert.      Transvaginal ultrasound demonstrates IUP measuring 7 weeks 1 day, very poor quality imaging, difficult scan. No yolk sac or cardiac activity identified. Color doppler negative.       "

## 2024-08-08 ENCOUNTER — OFFICE VISIT (OUTPATIENT)
Dept: OBGYN CLINIC | Facility: CLINIC | Age: 39
End: 2024-08-08
Payer: COMMERCIAL

## 2024-08-08 ENCOUNTER — ANESTHESIA EVENT (OUTPATIENT)
Dept: PERIOP | Facility: HOSPITAL | Age: 39
End: 2024-08-08
Payer: COMMERCIAL

## 2024-08-08 ENCOUNTER — PATIENT OUTREACH (OUTPATIENT)
Dept: OBGYN CLINIC | Facility: CLINIC | Age: 39
End: 2024-08-08

## 2024-08-08 ENCOUNTER — TELEPHONE (OUTPATIENT)
Dept: OBGYN CLINIC | Facility: CLINIC | Age: 39
End: 2024-08-08

## 2024-08-08 VITALS
HEIGHT: 64 IN | WEIGHT: 275.2 LBS | DIASTOLIC BLOOD PRESSURE: 78 MMHG | BODY MASS INDEX: 46.98 KG/M2 | SYSTOLIC BLOOD PRESSURE: 120 MMHG

## 2024-08-08 DIAGNOSIS — O02.1 MISSED AB: Primary | ICD-10-CM

## 2024-08-08 PROCEDURE — 99214 OFFICE O/P EST MOD 30 MIN: CPT | Performed by: OBSTETRICS & GYNECOLOGY

## 2024-08-08 NOTE — PROGRESS NOTES
Pre-Operative History & Physical  [facility] - [place]  [address], [place] [address]    Assessment/Plan:  Susan Barrett is a 39 y.o.  with miscarriage desiring definitive surgical management.    - Options for management of miscarriage were reviewed with the patient at length, including both medical management and surgical management options. .  - Plan for D&E, EUA. Surgical consents reviewed and signed with patient today. Risks of surgery were reviewed, including bleeding, infection, damage to surrounding organs/structures (specifically bowel, bladder, vessels, nerves, ureters), scar tissue formation, hernia, , and need for further surgery. All questions answered to the patient's apparent satisfaction. Patient agrees to proceed with surgery as discussed.   - Corie-operative pain control discussed. Reviewed first-line treatment with over-the-counter ibuprofen and acetaminophen. Post-operative opiate analgesics may be used for acute post-operative pain while in the hospital. A short course of opiate analgesics such as 5-10 tablets of oxycodone 5mg may be prescribed upon discharge. The [facility] database has been queried and the patient is an appropriate candidate for receipt of a prescription for a short course of opiate analgesics for acute post-operative pain control.   - Will not need pre-op risk stratification with PCP/Cardiology.  - PATs: CBC, T&S, HgbA1c, EKG  - Antibiotic prophylaxis: Doxycycline 200mg IV  - VTE ppx: SCDs.    ____________________________________    Subjective:   Susan Barrett is a 39 y.o.  .  CC:   Chief Complaint   Patient presents with    Surgical consult.       HPI: 38yo  @ 8.3 by LMP but measuring 7.1wks yesterday on u/s with NO cardiac motion presents for preop for D&E. She reports no bleeding. She is still anxious and in need of psych support.     ROS: Negative except as noted in HPI    The following portions of the patient's history were reviewed and  updated as appropriate:   Past Medical History:   Diagnosis Date    Alcohol withdrawal delirium, acute, hyperactive (HCC) 2022    Alcoholism (HCC)     Allergic     Anemia 2024    Anxiety     Depression     Disease of thyroid gland     ETOH abuse     Hypertension     Hyperthyroidism     Insulin controlled gestational diabetes mellitus (GDM) during pregnancy, antepartum 2019    Metformin dinner/HS insulin  Last Assessment & Plan:  BG log reviewed today:- previous review on Friday with recommendation for increasing HS insulin  Fastings: high normal 1 hour PP: within the desired range   Discussed rationale and recommendation to change current medical therapy as listed:   Bedtime: 18 units Levemir  Continue same dose of Metformin, 1000 mg at dinner.  Denies signs and sympto    Miscarriage     Obesity     Otitis media     Panic disorder      Past Surgical History:   Procedure Laterality Date     SECTION  2018     SECTION  10/20/2017    DILATION AND CURETTAGE OF UTERUS      IR LUMBAR PUNCTURE  6/3/2022    MASTOIDECTOMY  2013    MASTOIDECTOMY Right     WISDOM TOOTH EXTRACTION       Family History   Problem Relation Age of Onset    Depression Mother     Multiple sclerosis Mother     Thyroid disease Mother     Diabetes Mother             Hypertension Mother     COPD Mother     Colon cancer Father     Rectal cancer Father 54    Hyperlipidemia Father     Hypertension Father     Alcohol abuse Father     Cancer Father     Asthma Sister     Hypertension Sister     No Known Problems Maternal Grandmother     Alcohol abuse Maternal Grandfather     Alcohol abuse Paternal Grandmother     No Known Problems Daughter     Asthma Son     Esophageal cancer Maternal Aunt     Mental illness Paternal Uncle      Social History     Socioeconomic History    Marital status: /Civil Union     Spouse name: None    Number of children: None    Years of education: None    Highest education level: None    Occupational History    None   Tobacco Use    Smoking status: Former     Current packs/day: 0.00     Average packs/day: 0.5 packs/day for 10.0 years (5.0 ttl pk-yrs)     Types: Cigarettes     Quit date: 2017     Years since quittin.6     Passive exposure: Past    Smokeless tobacco: Never   Vaping Use    Vaping status: Some Days    Substances: THC   Substance and Sexual Activity    Alcohol use: Not Currently     Comment: Quit 2022    Drug use: Yes     Types: Marijuana     Comment: medical    Sexual activity: Yes     Partners: Male     Birth control/protection: Coitus interruptus   Other Topics Concern    None   Social History Narrative    None     Social Determinants of Health     Financial Resource Strain: Not on file   Food Insecurity: No Food Insecurity (6/3/2022)    Hunger Vital Sign     Worried About Running Out of Food in the Last Year: Never true     Ran Out of Food in the Last Year: Never true   Transportation Needs: No Transportation Needs (6/3/2022)    PRAPARE - Transportation     Lack of Transportation (Medical): No     Lack of Transportation (Non-Medical): No   Physical Activity: Not on file   Stress: Not on file   Social Connections: Not on file   Intimate Partner Violence: Not on file   Housing Stability: Low Risk  (6/3/2022)    Housing Stability Vital Sign     Unable to Pay for Housing in the Last Year: No     Number of Places Lived in the Last Year: 1     Unstable Housing in the Last Year: No     Outpatient Medications Marked as Taking for the 24 encounter (Office Visit) with Abner LARA DO   Medication    bisoprolol (ZEBETA) 10 MG tablet    Doxylamine Succinate, Sleep, (UNISOM PO)    fluticasone (FLONASE) 50 mcg/act nasal spray    hydrOXYzine pamoate (VISTARIL) 50 mg capsule    levothyroxine 50 mcg tablet    Multiple Vitamins-Minerals (MULTIVITAMIN ADULT PO)    pyridoxine (VITAMIN B6) 100 mg tablet    Tranexamic Acid 650 MG TABS    traZODone (DESYREL) 100 mg tablet     "venlafaxine (EFFEXOR-XR) 150 mg 24 hr capsule     Allergies   Allergen Reactions    Bee Venom Anaphylaxis    Contrast [Iodinated Contrast Media] Anaphylaxis           Imaging:  Intrauterine pregnancy with crown-rump length of 11 mm without fetal heart rate indicates failed pregnancy.     Pathology:  N/a    Objective:  /78 (BP Location: Left arm, Patient Position: Sitting, Cuff Size: Large)   Ht 5' 3.75\" (1.619 m)   Wt 125 kg (275 lb 3.2 oz)   LMP 05/27/2024   BMI 47.61 kg/m²      .    General Appearance: alert and oriented, in no acute distress.   HEENT: NC/AT, EOMI  CVS: Regular rate and rhythm  Lungs: Normal work of breathing  Abdomen: Soft, non-tender, non-distended, no masses, no rebound or guarding.  Pelvic: deferred  Extremities: Normal range of motion.   Skin: normal, no rash or abnormalities  Neurologic: alert, oriented x3  Psychiatric: Appropriate affect, mood stable, cooperative with exam.        Abner Barlow,   8/8/2024 3:02 PM   "

## 2024-08-08 NOTE — PROGRESS NOTES
Patient had appointment today following diagnosis of SAB - patient desires D&E, scheduled for 8/9/24. Patient had not returned SW's previous calls for assistance with therapy. RICHARD requested that Dr. Dial confirm if patient is still interested in  services at appointment today.    Dr. Barlow informed SW that patient is still interested in services, however she requested that SW reach out around her post-op appointment (8/20) as she is feeling overwhelmed currently. RICHARD will keep current referral active until next outreach.

## 2024-08-08 NOTE — TELEPHONE ENCOUNTER
----- Message from Abner Barlow DO sent at 2024  3:03 PM EDT -----  Regarding: surgery scheduling  [facility] GYN Department  Surgery Scheduling Sheet    Patient Name: Susan Barrett  : 1985    Provider: Abner Barlow DO     Needed: no; Language: N/A    Procedure: exam under anesthesia and dilation and evacuation    Diagnosis: missed ab    Special Needs or Equipment: none    Anesthesia: choice    Length of stay: outpatient  Does patient have comorbid conditions that will require close perioperative monitoring prior to safe discharge: no    The patient has comorbid conditions that will require close perioperative monitoring prior to safe discharge, including N/A.   This may require acute care beyond the usual and routine recovery period. As such, inpatient admission post-operatively is expected and appropriate, and anticipated hospital length of stay will be >2 midnights.    Pre-Admission Testing Needed: yes but can be done bedside   Labs that should be ordered: cbc, hgb A1C, type and screen, BMP, and EKG    Order PAT that is recommended in prep for procedure?: Yes    Medical Clearance Needed: no; Provider: N/A    MA Form Signed (tubals/hysterectomy): Not Indicated    Surgical Drink Given: no     How many days out of work: 3 day(s)     How many days no drivin day(s)       Is pre op appt needed?  no  Interval for post op appt: 2 week(s)     For Surgical Scheduler:     Surgery Scheduled On:  [place]: [facility]    Pre-op Appt:   Post op Appt:  Consult/Medical clearance appt:

## 2024-08-08 NOTE — H&P (VIEW-ONLY)
Pre-Operative History & Physical  [facility] - [place]  [address], [place] [address]    Assessment/Plan:  Susan Barrett is a 39 y.o.  with miscarriage desiring definitive surgical management.    - Options for management of miscarriage were reviewed with the patient at length, including both medical management and surgical management options. .  - Plan for D&E, EUA. Surgical consents reviewed and signed with patient today. Risks of surgery were reviewed, including bleeding, infection, damage to surrounding organs/structures (specifically bowel, bladder, vessels, nerves, ureters), scar tissue formation, hernia, , and need for further surgery. All questions answered to the patient's apparent satisfaction. Patient agrees to proceed with surgery as discussed.   - Corie-operative pain control discussed. Reviewed first-line treatment with over-the-counter ibuprofen and acetaminophen. Post-operative opiate analgesics may be used for acute post-operative pain while in the hospital. A short course of opiate analgesics such as 5-10 tablets of oxycodone 5mg may be prescribed upon discharge. The [facility] database has been queried and the patient is an appropriate candidate for receipt of a prescription for a short course of opiate analgesics for acute post-operative pain control.   - Will not need pre-op risk stratification with PCP/Cardiology.  - PATs: CBC, T&S, HgbA1c, EKG  - Antibiotic prophylaxis: Doxycycline 200mg IV  - VTE ppx: SCDs.    ____________________________________    Subjective:   Susan Barrett is a 39 y.o.  .  CC:   Chief Complaint   Patient presents with    Surgical consult.       HPI: 40yo  @ 8.3 by LMP but measuring 7.1wks yesterday on u/s with NO cardiac motion presents for preop for D&E. She reports no bleeding. She is still anxious and in need of psych support.     ROS: Negative except as noted in HPI    The following portions of the patient's history were reviewed and  updated as appropriate:   Past Medical History:   Diagnosis Date    Alcohol withdrawal delirium, acute, hyperactive (HCC) 2022    Alcoholism (HCC)     Allergic     Anemia 2024    Anxiety     Depression     Disease of thyroid gland     ETOH abuse     Hypertension     Hyperthyroidism     Insulin controlled gestational diabetes mellitus (GDM) during pregnancy, antepartum 2019    Metformin dinner/HS insulin  Last Assessment & Plan:  BG log reviewed today:- previous review on Friday with recommendation for increasing HS insulin  Fastings: high normal 1 hour PP: within the desired range   Discussed rationale and recommendation to change current medical therapy as listed:   Bedtime: 18 units Levemir  Continue same dose of Metformin, 1000 mg at dinner.  Denies signs and sympto    Miscarriage     Obesity     Otitis media     Panic disorder      Past Surgical History:   Procedure Laterality Date     SECTION  2018     SECTION  10/20/2017    DILATION AND CURETTAGE OF UTERUS      IR LUMBAR PUNCTURE  6/3/2022    MASTOIDECTOMY  2013    MASTOIDECTOMY Right     WISDOM TOOTH EXTRACTION       Family History   Problem Relation Age of Onset    Depression Mother     Multiple sclerosis Mother     Thyroid disease Mother     Diabetes Mother             Hypertension Mother     COPD Mother     Colon cancer Father     Rectal cancer Father 54    Hyperlipidemia Father     Hypertension Father     Alcohol abuse Father     Cancer Father     Asthma Sister     Hypertension Sister     No Known Problems Maternal Grandmother     Alcohol abuse Maternal Grandfather     Alcohol abuse Paternal Grandmother     No Known Problems Daughter     Asthma Son     Esophageal cancer Maternal Aunt     Mental illness Paternal Uncle      Social History     Socioeconomic History    Marital status: /Civil Union     Spouse name: None    Number of children: None    Years of education: None    Highest education level: None    Occupational History    None   Tobacco Use    Smoking status: Former     Current packs/day: 0.00     Average packs/day: 0.5 packs/day for 10.0 years (5.0 ttl pk-yrs)     Types: Cigarettes     Quit date: 2017     Years since quittin.6     Passive exposure: Past    Smokeless tobacco: Never   Vaping Use    Vaping status: Some Days    Substances: THC   Substance and Sexual Activity    Alcohol use: Not Currently     Comment: Quit 2022    Drug use: Yes     Types: Marijuana     Comment: medical    Sexual activity: Yes     Partners: Male     Birth control/protection: Coitus interruptus   Other Topics Concern    None   Social History Narrative    None     Social Determinants of Health     Financial Resource Strain: Not on file   Food Insecurity: No Food Insecurity (6/3/2022)    Hunger Vital Sign     Worried About Running Out of Food in the Last Year: Never true     Ran Out of Food in the Last Year: Never true   Transportation Needs: No Transportation Needs (6/3/2022)    PRAPARE - Transportation     Lack of Transportation (Medical): No     Lack of Transportation (Non-Medical): No   Physical Activity: Not on file   Stress: Not on file   Social Connections: Not on file   Intimate Partner Violence: Not on file   Housing Stability: Low Risk  (6/3/2022)    Housing Stability Vital Sign     Unable to Pay for Housing in the Last Year: No     Number of Places Lived in the Last Year: 1     Unstable Housing in the Last Year: No     Outpatient Medications Marked as Taking for the 24 encounter (Office Visit) with Abner LARA DO   Medication    bisoprolol (ZEBETA) 10 MG tablet    Doxylamine Succinate, Sleep, (UNISOM PO)    fluticasone (FLONASE) 50 mcg/act nasal spray    hydrOXYzine pamoate (VISTARIL) 50 mg capsule    levothyroxine 50 mcg tablet    Multiple Vitamins-Minerals (MULTIVITAMIN ADULT PO)    pyridoxine (VITAMIN B6) 100 mg tablet    Tranexamic Acid 650 MG TABS    traZODone (DESYREL) 100 mg tablet     "venlafaxine (EFFEXOR-XR) 150 mg 24 hr capsule     Allergies   Allergen Reactions    Bee Venom Anaphylaxis    Contrast [Iodinated Contrast Media] Anaphylaxis           Imaging:  Intrauterine pregnancy with crown-rump length of 11 mm without fetal heart rate indicates failed pregnancy.     Pathology:  N/a    Objective:  /78 (BP Location: Left arm, Patient Position: Sitting, Cuff Size: Large)   Ht 5' 3.75\" (1.619 m)   Wt 125 kg (275 lb 3.2 oz)   LMP 05/27/2024   BMI 47.61 kg/m²      .    General Appearance: alert and oriented, in no acute distress.   HEENT: NC/AT, EOMI  CVS: Regular rate and rhythm  Lungs: Normal work of breathing  Abdomen: Soft, non-tender, non-distended, no masses, no rebound or guarding.  Pelvic: deferred  Extremities: Normal range of motion.   Skin: normal, no rash or abnormalities  Neurologic: alert, oriented x3  Psychiatric: Appropriate affect, mood stable, cooperative with exam.        Abner Barlow,   8/8/2024 3:02 PM   "

## 2024-08-09 ENCOUNTER — ANESTHESIA (OUTPATIENT)
Dept: PERIOP | Facility: HOSPITAL | Age: 39
End: 2024-08-09
Payer: COMMERCIAL

## 2024-08-09 ENCOUNTER — HOSPITAL ENCOUNTER (OUTPATIENT)
Facility: HOSPITAL | Age: 39
Setting detail: OUTPATIENT SURGERY
Discharge: HOME | End: 2024-08-09
Attending: OBSTETRICS & GYNECOLOGY | Admitting: OBSTETRICS & GYNECOLOGY
Payer: COMMERCIAL

## 2024-08-09 VITALS
RESPIRATION RATE: 23 BRPM | SYSTOLIC BLOOD PRESSURE: 110 MMHG | BODY MASS INDEX: 47.05 KG/M2 | TEMPERATURE: 97.3 F | DIASTOLIC BLOOD PRESSURE: 68 MMHG | HEART RATE: 68 BPM | HEIGHT: 64 IN | OXYGEN SATURATION: 98 % | WEIGHT: 275.57 LBS

## 2024-08-09 DIAGNOSIS — O03.9 MISCARRIAGE: ICD-10-CM

## 2024-08-09 LAB
ABO GROUP BLD: NORMAL
ANION GAP SERPL CALCULATED.3IONS-SCNC: 9 MMOL/L (ref 4–13)
BASOPHILS # BLD AUTO: 0.05 THOUSANDS/ÂΜL (ref 0–0.1)
BASOPHILS NFR BLD AUTO: 1 % (ref 0–1)
BLD GP AB SCN SERPL QL: NEGATIVE
BUN SERPL-MCNC: 6 MG/DL (ref 5–25)
CALCIUM SERPL-MCNC: 9.2 MG/DL (ref 8.4–10.2)
CHLORIDE SERPL-SCNC: 107 MMOL/L (ref 96–108)
CO2 SERPL-SCNC: 21 MMOL/L (ref 21–32)
CREAT SERPL-MCNC: 0.42 MG/DL (ref 0.6–1.3)
EOSINOPHIL # BLD AUTO: 0.01 THOUSAND/ÂΜL (ref 0–0.61)
EOSINOPHIL NFR BLD AUTO: 0 % (ref 0–6)
ERYTHROCYTE [DISTWIDTH] IN BLOOD BY AUTOMATED COUNT: 12.9 % (ref 11.6–15.1)
EST. AVERAGE GLUCOSE BLD GHB EST-MCNC: 105 MG/DL
GFR SERPL CREATININE-BSD FRML MDRD: 131 ML/MIN/1.73SQ M
GLUCOSE P FAST SERPL-MCNC: 92 MG/DL (ref 65–99)
GLUCOSE SERPL-MCNC: 92 MG/DL (ref 65–140)
HBA1C MFR BLD: 5.3 %
HCT VFR BLD AUTO: 42 % (ref 34.8–46.1)
HGB BLD-MCNC: 14.7 G/DL (ref 11.5–15.4)
IMM GRANULOCYTES # BLD AUTO: 0.06 THOUSAND/UL (ref 0–0.2)
IMM GRANULOCYTES NFR BLD AUTO: 1 % (ref 0–2)
LYMPHOCYTES # BLD AUTO: 2.05 THOUSANDS/ÂΜL (ref 0.6–4.47)
LYMPHOCYTES NFR BLD AUTO: 30 % (ref 14–44)
MCH RBC QN AUTO: 31.7 PG (ref 26.8–34.3)
MCHC RBC AUTO-ENTMCNC: 35 G/DL (ref 31.4–37.4)
MCV RBC AUTO: 91 FL (ref 82–98)
MONOCYTES # BLD AUTO: 0.46 THOUSAND/ÂΜL (ref 0.17–1.22)
MONOCYTES NFR BLD AUTO: 7 % (ref 4–12)
NEUTROPHILS # BLD AUTO: 4.18 THOUSANDS/ÂΜL (ref 1.85–7.62)
NEUTS SEG NFR BLD AUTO: 61 % (ref 43–75)
NRBC BLD AUTO-RTO: 0 /100 WBCS
PLATELET # BLD AUTO: 216 THOUSANDS/UL (ref 149–390)
PMV BLD AUTO: 9.7 FL (ref 8.9–12.7)
POTASSIUM SERPL-SCNC: 3.8 MMOL/L (ref 3.5–5.3)
RBC # BLD AUTO: 4.63 MILLION/UL (ref 3.81–5.12)
RH BLD: POSITIVE
SODIUM SERPL-SCNC: 137 MMOL/L (ref 135–147)
SPECIMEN EXPIRATION DATE: NORMAL
WBC # BLD AUTO: 6.81 THOUSAND/UL (ref 4.31–10.16)

## 2024-08-09 PROCEDURE — 88305 TISSUE EXAM BY PATHOLOGIST: CPT | Performed by: PATHOLOGY

## 2024-08-09 PROCEDURE — 59812 TREATMENT OF MISCARRIAGE: CPT | Performed by: OBSTETRICS & GYNECOLOGY

## 2024-08-09 PROCEDURE — 80048 BASIC METABOLIC PNL TOTAL CA: CPT | Performed by: OBSTETRICS & GYNECOLOGY

## 2024-08-09 PROCEDURE — 86900 BLOOD TYPING SEROLOGIC ABO: CPT | Performed by: OBSTETRICS & GYNECOLOGY

## 2024-08-09 PROCEDURE — 83036 HEMOGLOBIN GLYCOSYLATED A1C: CPT | Performed by: OBSTETRICS & GYNECOLOGY

## 2024-08-09 PROCEDURE — 85025 COMPLETE CBC W/AUTO DIFF WBC: CPT | Performed by: OBSTETRICS & GYNECOLOGY

## 2024-08-09 PROCEDURE — 86901 BLOOD TYPING SEROLOGIC RH(D): CPT | Performed by: OBSTETRICS & GYNECOLOGY

## 2024-08-09 PROCEDURE — 86850 RBC ANTIBODY SCREEN: CPT | Performed by: OBSTETRICS & GYNECOLOGY

## 2024-08-09 PROCEDURE — 88342 IMHCHEM/IMCYTCHM 1ST ANTB: CPT | Performed by: PATHOLOGY

## 2024-08-09 PROCEDURE — 88377 M/PHMTRC ALYS ISHQUANT/SEMIQ: CPT | Performed by: PATHOLOGY

## 2024-08-09 RX ORDER — LIDOCAINE HYDROCHLORIDE 10 MG/ML
INJECTION, SOLUTION EPIDURAL; INFILTRATION; INTRACAUDAL; PERINEURAL AS NEEDED
Status: DISCONTINUED | OUTPATIENT
Start: 2024-08-09 | End: 2024-08-09 | Stop reason: HOSPADM

## 2024-08-09 RX ORDER — PROPOFOL 10 MG/ML
INJECTION, EMULSION INTRAVENOUS CONTINUOUS PRN
Status: DISCONTINUED | OUTPATIENT
Start: 2024-08-09 | End: 2024-08-09

## 2024-08-09 RX ORDER — FENTANYL CITRATE 50 UG/ML
INJECTION, SOLUTION INTRAMUSCULAR; INTRAVENOUS AS NEEDED
Status: DISCONTINUED | OUTPATIENT
Start: 2024-08-09 | End: 2024-08-09

## 2024-08-09 RX ORDER — SODIUM CHLORIDE, SODIUM LACTATE, POTASSIUM CHLORIDE, CALCIUM CHLORIDE 600; 310; 30; 20 MG/100ML; MG/100ML; MG/100ML; MG/100ML
INJECTION, SOLUTION INTRAVENOUS CONTINUOUS PRN
Status: DISCONTINUED | OUTPATIENT
Start: 2024-08-09 | End: 2024-08-09

## 2024-08-09 RX ORDER — KETOROLAC TROMETHAMINE 30 MG/ML
INJECTION, SOLUTION INTRAMUSCULAR; INTRAVENOUS AS NEEDED
Status: DISCONTINUED | OUTPATIENT
Start: 2024-08-09 | End: 2024-08-09

## 2024-08-09 RX ORDER — FENTANYL CITRATE/PF 50 MCG/ML
50 SYRINGE (ML) INJECTION
Status: DISCONTINUED | OUTPATIENT
Start: 2024-08-09 | End: 2024-08-09 | Stop reason: HOSPADM

## 2024-08-09 RX ORDER — ONDANSETRON 2 MG/ML
4 INJECTION INTRAMUSCULAR; INTRAVENOUS ONCE AS NEEDED
Status: DISCONTINUED | OUTPATIENT
Start: 2024-08-09 | End: 2024-08-09 | Stop reason: HOSPADM

## 2024-08-09 RX ORDER — LIDOCAINE HYDROCHLORIDE 20 MG/ML
INJECTION, SOLUTION EPIDURAL; INFILTRATION; INTRACAUDAL; PERINEURAL AS NEEDED
Status: DISCONTINUED | OUTPATIENT
Start: 2024-08-09 | End: 2024-08-09

## 2024-08-09 RX ORDER — DEXAMETHASONE SODIUM PHOSPHATE 10 MG/ML
INJECTION, SOLUTION INTRAMUSCULAR; INTRAVENOUS AS NEEDED
Status: DISCONTINUED | OUTPATIENT
Start: 2024-08-09 | End: 2024-08-09

## 2024-08-09 RX ORDER — ONDANSETRON 2 MG/ML
4 INJECTION INTRAMUSCULAR; INTRAVENOUS EVERY 4 HOURS PRN
Status: DISCONTINUED | OUTPATIENT
Start: 2024-08-09 | End: 2024-08-09 | Stop reason: HOSPADM

## 2024-08-09 RX ORDER — MIDAZOLAM HYDROCHLORIDE 2 MG/2ML
INJECTION, SOLUTION INTRAMUSCULAR; INTRAVENOUS AS NEEDED
Status: DISCONTINUED | OUTPATIENT
Start: 2024-08-09 | End: 2024-08-09

## 2024-08-09 RX ORDER — PROPOFOL 10 MG/ML
INJECTION, EMULSION INTRAVENOUS AS NEEDED
Status: DISCONTINUED | OUTPATIENT
Start: 2024-08-09 | End: 2024-08-09

## 2024-08-09 RX ORDER — METOCLOPRAMIDE HYDROCHLORIDE 5 MG/ML
10 INJECTION INTRAMUSCULAR; INTRAVENOUS ONCE AS NEEDED
Status: DISCONTINUED | OUTPATIENT
Start: 2024-08-09 | End: 2024-08-09 | Stop reason: HOSPADM

## 2024-08-09 RX ORDER — DOXYCYCLINE 100 MG/1
200 CAPSULE ORAL ONCE
Status: COMPLETED | OUTPATIENT
Start: 2024-08-09 | End: 2024-08-09

## 2024-08-09 RX ORDER — ONDANSETRON 2 MG/ML
INJECTION INTRAMUSCULAR; INTRAVENOUS AS NEEDED
Status: DISCONTINUED | OUTPATIENT
Start: 2024-08-09 | End: 2024-08-09

## 2024-08-09 RX ADMIN — DEXAMETHASONE SODIUM PHOSPHATE 10 MG: 10 INJECTION, SOLUTION INTRAMUSCULAR; INTRAVENOUS at 08:34

## 2024-08-09 RX ADMIN — ONDANSETRON 4 MG: 2 INJECTION, SOLUTION INTRAMUSCULAR; INTRAVENOUS at 08:14

## 2024-08-09 RX ADMIN — FENTANYL CITRATE 50 MCG: 50 INJECTION, SOLUTION INTRAMUSCULAR; INTRAVENOUS at 08:57

## 2024-08-09 RX ADMIN — ONDANSETRON 4 MG: 2 INJECTION INTRAMUSCULAR; INTRAVENOUS at 08:34

## 2024-08-09 RX ADMIN — LIDOCAINE HYDROCHLORIDE 100 MG: 20 INJECTION, SOLUTION EPIDURAL; INFILTRATION; INTRACAUDAL; PERINEURAL at 08:34

## 2024-08-09 RX ADMIN — FENTANYL CITRATE 50 MCG: 50 INJECTION, SOLUTION INTRAMUSCULAR; INTRAVENOUS at 08:53

## 2024-08-09 RX ADMIN — FENTANYL CITRATE 50 MCG: 50 INJECTION, SOLUTION INTRAMUSCULAR; INTRAVENOUS at 08:38

## 2024-08-09 RX ADMIN — KETOROLAC TROMETHAMINE 15 MG: 30 INJECTION, SOLUTION INTRAMUSCULAR; INTRAVENOUS at 08:57

## 2024-08-09 RX ADMIN — FENTANYL CITRATE 50 MCG: 50 INJECTION, SOLUTION INTRAMUSCULAR; INTRAVENOUS at 08:39

## 2024-08-09 RX ADMIN — MIDAZOLAM 2 MG: 1 INJECTION INTRAMUSCULAR; INTRAVENOUS at 08:30

## 2024-08-09 RX ADMIN — PROPOFOL 300 MG: 10 INJECTION, EMULSION INTRAVENOUS at 08:34

## 2024-08-09 RX ADMIN — SODIUM CHLORIDE, SODIUM LACTATE, POTASSIUM CHLORIDE, AND CALCIUM CHLORIDE: .6; .31; .03; .02 INJECTION, SOLUTION INTRAVENOUS at 08:30

## 2024-08-09 RX ADMIN — DOXYCYCLINE 200 MG: 100 CAPSULE ORAL at 07:52

## 2024-08-09 RX ADMIN — DEXMEDETOMIDINE 20 MCG: 100 INJECTION, SOLUTION, CONCENTRATE INTRAVENOUS at 08:41

## 2024-08-09 RX ADMIN — PROPOFOL 250 MCG/KG/MIN: 10 INJECTION, EMULSION INTRAVENOUS at 08:36

## 2024-08-09 NOTE — INTERVAL H&P NOTE
H&P reviewed. After examining the patient I find no changes in the patients condition since the H&P had been written.    No changes.    Vitals:    08/09/24 0732   BP: 140/66   Pulse: 81   Resp: 16   Temp: 99.2 °F (37.3 °C)   SpO2: 96%

## 2024-08-09 NOTE — DISCHARGE INSTR - AVS FIRST PAGE
Please relax for the next 12/24 hours.  It is okay to shower.  Take Motrin (ibuprofen) 600mg every 6 hours for the first 24 hours.  You may take Tylenol (acetaminophen) 650mg every 6 hours, in addition to the Motrin if needed.    Some bleeding is to be expected, like a light period.  Please call office if bleeding heavier than a period.  Can last up to a week or maybe more.  No baths/swimming, tampons or intercourse until bleeding completely stops.  Please call office if you develop fever > 100.5, chills, severe abdominal pain not improved with medications above, vomiting, or heavy bleeding (heavier than a period).    Recommendations for future conception -     - continue prenatal vitamins    - wait one normal period cycle to try to conceive again, if that is your plan    - Consider starting daily baby aspirin (81mg), while trying to conceive and in early pregnancy.  Some research has shown could increase chances of successful pregnancy following miscarriage.  There is strong evidence of no risk to pregnancy.

## 2024-08-09 NOTE — ANESTHESIA POSTPROCEDURE EVALUATION
Post-Op Assessment Note    CV Status:  Stable  Pain Score: 0    Pain management: adequate       Mental Status:  Arousable and sleepy   Hydration Status:  Stable   PONV Controlled:  Controlled   Airway Patency:  Patent     Post Op Vitals Reviewed: Yes    No anethesia notable event occurred.    Staff: CRNA               BP   100/57   Temp 97.6   Pulse 77   Resp 14   SpO2 99

## 2024-08-09 NOTE — ANESTHESIA PREPROCEDURE EVALUATION
Procedure:  DILATATION AND EVACUATION (D&E) 7 WEEKS, EXAM UNDER ANESTHESIA (Uterus)    Relevant Problems   CARDIO   (+) Essential hypertension      ENDO   (+) Hypothyroidism      HEMATOLOGY   (+) Iron deficiency anemia      NEURO/PSYCH   (+) Current moderate episode of major depressive disorder (HCC)   (+) Diplopia   (+) Generalized anxiety disorder        Physical Exam    Airway    Mallampati score: II  TM Distance: >3 FB  Neck ROM: full     Dental       Cardiovascular      Pulmonary      Other Findings  post-pubertal.      Anesthesia Plan  ASA Score- 2     Anesthesia Type- general with ASA Monitors.         Additional Monitors:     Airway Plan: LMA.           Plan Factors-    Chart reviewed.                      Induction- intravenous.    Postoperative Plan-         Informed Consent- Anesthetic plan and risks discussed with patient.  I personally reviewed this patient with the CRNA. Discussed and agreed on the Anesthesia Plan with the CRNA..

## 2024-08-09 NOTE — OP NOTE
OPERATIVE REPORT  PATIENT NAME: Susan Barrett    :  1985  MRN: [id number]  Pt Location: [facility] OR ROOM 04    SURGERY DATE: 2024    Surgeons and Role:     * Clare Davidson MD - Primary    Preop Diagnosis:  Miscarriage [O03.9]    Post-Op Diagnosis Codes:     * Miscarriage [O03.9]    Procedure(s):  DILATATION AND EVACUATION (D&E) 7 WEEKS. EXAM UNDER ANESTHESIA    Specimen(s):  ID Type Source Tests Collected by Time Destination   1 : POC Tissue Products of Conception TISSUE EXAM Clare Davidson MD 2024 0847        Estimated Blood Loss:   Minimal    Drains:  * No LDAs found *    Anesthesia Type:   LMA with Anesthesia    Operative Indications:  Miscarriage [O03.9]      Operative Findings:  Cervix closed      Complications:   None    Procedure and Technique:  The patient was taken to the operating room where she was administered LMA/IV sedation by anesthesia. She received Doxycycline 200mg PO prior to start of procedure.  She was prepped and draped in the usual sterile fashion in the dorsal lithotomy position. A speculum was placed into the vagina to allow for good visualization of the cervix. A single-toothed tenaculum was then placed in the anterior lip of the cervix. 10cc of 1% lidocaine was used at 4:00 and 8:00 as a pericervical block.  The cervix initially noted to be closed and was dilated to accommodate a #25 Tamazight tata dilator.    A 8mm curved suction curette was then attached to suction and placed into the uterus.  The suction was activated and curette rotated to remove blood and tissue from the uterine cavity.  Multiple passes were made until no further tissue or blood was noted.  The suction curette was removed and a metal curette was used gently to make sure no further tissue in the uterus and a cry was noted. The suction curette was placed one more time to remove any remained blood or tissue.  Upon completion, the single-toothed tenaculum was removed from the anterior lip of the cervix and  good hemostasis was noted. All instruments were removed from the vagina.   Instrument, needle and sponge counts were correct x 2.      The patient tolerated the procedure well and was taken to the recovery room in stable condition.  Patient's  was called and findings reviewed.   I was present for the entire procedure.    Patient Disposition:  PACU         SIGNATURE: Clare Davidson MD  DATE: August 9, 2024  TIME: 9:00 AM

## 2024-08-21 ENCOUNTER — PATIENT OUTREACH (OUTPATIENT)
Dept: OBGYN CLINIC | Facility: CLINIC | Age: 39
End: 2024-08-21

## 2024-08-21 PROCEDURE — 88342 IMHCHEM/IMCYTCHM 1ST ANTB: CPT | Performed by: PATHOLOGY

## 2024-08-21 PROCEDURE — 88305 TISSUE EXAM BY PATHOLOGIST: CPT | Performed by: PATHOLOGY

## 2024-08-21 NOTE — PROGRESS NOTES
RICHARD had planned to meet with patient after her post-op appointment yesterday as previously requested. Unfortunately patient cancelled. Appointment has not yet been rescheduled. RICHARD called patient to check in/discuss resources and inquire about rescheduling. No answer. RICHARD left VM with RICHARD's contact information and phone number for office to reschedule post-op visit. RICHARD notifed Dr. Dial of same. Patient currently has no upcoming appointments scheduled with [facility]. Routed to clerical staff for asstiance with rescheduling as requested by  Sweta. Additional VM left for patient by  Susan.

## 2024-08-24 DIAGNOSIS — F41.1 GENERALIZED ANXIETY DISORDER: ICD-10-CM

## 2024-08-25 RX ORDER — VENLAFAXINE HYDROCHLORIDE 150 MG/1
CAPSULE, EXTENDED RELEASE ORAL
Qty: 90 CAPSULE | Refills: 1 | Status: SHIPPED | OUTPATIENT
Start: 2024-08-25

## 2024-08-27 ENCOUNTER — PATIENT OUTREACH (OUTPATIENT)
Dept: OBGYN CLINIC | Facility: CLINIC | Age: 39
End: 2024-08-27

## 2024-08-27 NOTE — PROGRESS NOTES
SW attempted to call patient again today regarding SAB resources/support and therapy services. No answer. SW left VM requesting a call back. Patient has not yet contacted office to reschedule D&E f/u appointment from last week - SW also included main number and requested patient call to reschedule.     Due to lack of response from patient, SW sent UTR letter via [employer]. SW closing referral at this time, please re-refer as needed.

## 2024-08-27 NOTE — LETTER
08/27/24    Bg Davis,    I hope you are doing well. I am reaching out because I have made a few attempts to call you since our last conversation on 07/11/24. Unfortunately, I have not been able to reach you! I apologize if I have been calling at a bad time.     If you are still interested in social work services, I kindly request that you contact me at [phone] so that I can assist with your care needs. If you are no longer interested, please let me know. My normal hours are Monday through Friday, 8:00am to 4:30pm.    Take care,    CARMENZA Cota, LSW

## 2024-09-16 ENCOUNTER — TELEPHONE (OUTPATIENT)
Dept: OBGYN CLINIC | Facility: CLINIC | Age: 39
End: 2024-09-16

## 2024-09-16 NOTE — TELEPHONE ENCOUNTER
9/16/24 spoke to pt regarding r/s post-op appt that was cancel vis my chart. Pt states it was not a good time to talk and she will call back to get r/s for post-op appt with Dr. Barlow.

## 2024-10-30 DIAGNOSIS — F41.1 GENERALIZED ANXIETY DISORDER: ICD-10-CM

## 2024-10-30 RX ORDER — TRAZODONE HYDROCHLORIDE 100 MG/1
100 TABLET ORAL
Qty: 30 TABLET | Refills: 0 | Status: SHIPPED | OUTPATIENT
Start: 2024-10-30

## 2024-10-30 NOTE — TELEPHONE ENCOUNTER
Patient needs an appointment. Please contact the patient to schedule an appointment. Last office visit: 1/26/2023

## 2024-11-05 NOTE — TELEPHONE ENCOUNTER
Spoke w/ patient today.  She would not schedule an appt.  States there was an incident with the staff and she feels she was spoken to inappropriately - would not give any further info.  States had she known it was this office calling she would not have answered.  Patient states it took her a long time to work up the trust with her provider, but the incident with the staff took that away.  She stated she now doesn't feel she has a doctor and doesn't know what to do.  Patient was asked if there was anything I could do to help her - she said there was not.  Patient ended the call.

## 2024-11-26 DIAGNOSIS — F41.1 GENERALIZED ANXIETY DISORDER: ICD-10-CM

## 2024-11-28 RX ORDER — TRAZODONE HYDROCHLORIDE 100 MG/1
100 TABLET ORAL
Qty: 90 TABLET | Refills: 0 | Status: SHIPPED | OUTPATIENT
Start: 2024-11-28

## 2024-11-29 NOTE — TELEPHONE ENCOUNTER
Please notify patient that their medication was approved but no refills were given. She is overdue for follow up appt and needs to make an appt to con't receiving medications.  TY

## 2024-12-27 DIAGNOSIS — E03.9 HYPOTHYROIDISM, UNSPECIFIED TYPE: ICD-10-CM

## 2024-12-27 RX ORDER — LEVOTHYROXINE SODIUM 50 UG/1
50 TABLET ORAL DAILY
Qty: 90 TABLET | Refills: 1 | Status: SHIPPED | OUTPATIENT
Start: 2024-12-27

## 2025-02-25 DIAGNOSIS — F41.1 GENERALIZED ANXIETY DISORDER: ICD-10-CM

## 2025-02-26 RX ORDER — VENLAFAXINE HYDROCHLORIDE 150 MG/1
150 CAPSULE, EXTENDED RELEASE ORAL DAILY
Qty: 30 CAPSULE | Refills: 0 | Status: SHIPPED | OUTPATIENT
Start: 2025-02-26

## 2025-02-26 RX ORDER — TRAZODONE HYDROCHLORIDE 100 MG/1
100 TABLET ORAL
Qty: 30 TABLET | Refills: 0 | Status: SHIPPED | OUTPATIENT
Start: 2025-02-26

## 2025-02-26 NOTE — TELEPHONE ENCOUNTER
2nd notice: Please notify patient that their medication was approved for 30 days but no refills were given. She is overdue for follow up appt and needs to make an appt to con't receiving medications.  TY

## 2025-03-28 DIAGNOSIS — F41.1 GENERALIZED ANXIETY DISORDER: ICD-10-CM

## 2025-03-28 RX ORDER — TRAZODONE HYDROCHLORIDE 100 MG/1
100 TABLET ORAL
Qty: 90 TABLET | Refills: 1 | OUTPATIENT
Start: 2025-03-28

## 2025-03-28 RX ORDER — VENLAFAXINE HYDROCHLORIDE 150 MG/1
150 CAPSULE, EXTENDED RELEASE ORAL DAILY
Qty: 90 CAPSULE | Refills: 1 | OUTPATIENT
Start: 2025-03-28

## 2025-04-01 DIAGNOSIS — F41.1 GENERALIZED ANXIETY DISORDER: ICD-10-CM

## 2025-04-02 RX ORDER — TRAZODONE HYDROCHLORIDE 100 MG/1
100 TABLET ORAL
Qty: 30 TABLET | Refills: 0 | Status: SHIPPED | OUTPATIENT
Start: 2025-04-02

## 2025-04-02 RX ORDER — VENLAFAXINE HYDROCHLORIDE 150 MG/1
150 CAPSULE, EXTENDED RELEASE ORAL DAILY
Qty: 30 CAPSULE | Refills: 0 | Status: SHIPPED | OUTPATIENT
Start: 2025-04-02

## 2025-04-02 NOTE — TELEPHONE ENCOUNTER
Left detailed message for patient that she needs an appt in order to continue with medication refills

## 2025-04-02 NOTE — TELEPHONE ENCOUNTER
3rd and final notice: Please notify patient that their medication was approved but no refills were given. She is overdue for follow up appt and needs to make an appt  to con't receiving medications.  TY

## 2025-04-03 NOTE — TELEPHONE ENCOUNTER
Left detailed message for pt to call and schedule an appt to continue receiving medications. Post card mailed as well.

## 2025-04-30 DIAGNOSIS — F41.1 GENERALIZED ANXIETY DISORDER: ICD-10-CM

## 2025-05-01 RX ORDER — TRAZODONE HYDROCHLORIDE 100 MG/1
100 TABLET ORAL
Qty: 90 TABLET | Refills: 1 | OUTPATIENT
Start: 2025-05-01

## 2025-05-01 RX ORDER — VENLAFAXINE HYDROCHLORIDE 150 MG/1
150 CAPSULE, EXTENDED RELEASE ORAL DAILY
Qty: 90 CAPSULE | Refills: 1 | OUTPATIENT
Start: 2025-05-01

## 2025-05-04 DIAGNOSIS — F41.1 GENERALIZED ANXIETY DISORDER: ICD-10-CM

## 2025-05-05 RX ORDER — TRAZODONE HYDROCHLORIDE 100 MG/1
100 TABLET ORAL
Qty: 30 TABLET | Refills: 0 | OUTPATIENT
Start: 2025-05-05

## 2025-05-05 RX ORDER — VENLAFAXINE HYDROCHLORIDE 150 MG/1
150 CAPSULE, EXTENDED RELEASE ORAL DAILY
Qty: 30 CAPSULE | Refills: 0 | OUTPATIENT
Start: 2025-05-05

## 2025-05-10 ENCOUNTER — APPOINTMENT (EMERGENCY)
Dept: CT IMAGING | Facility: HOSPITAL | Age: 40
End: 2025-05-10
Payer: COMMERCIAL

## 2025-05-10 ENCOUNTER — HOSPITAL ENCOUNTER (EMERGENCY)
Facility: HOSPITAL | Age: 40
Discharge: HOME | End: 2025-05-11
Attending: EMERGENCY MEDICINE
Payer: COMMERCIAL

## 2025-05-10 DIAGNOSIS — R45.851 SUICIDAL IDEATIONS: Primary | ICD-10-CM

## 2025-05-10 DIAGNOSIS — F10.90 ALCOHOL USE: ICD-10-CM

## 2025-05-10 LAB
ALBUMIN SERPL BCG-MCNC: 4.7 G/DL (ref 3.5–5)
ALP SERPL-CCNC: 57 U/L (ref 34–104)
ALT SERPL W P-5'-P-CCNC: 35 U/L (ref 7–52)
AMPHETAMINES SERPL QL SCN: NEGATIVE
ANION GAP SERPL CALCULATED.3IONS-SCNC: 11 MMOL/L (ref 4–13)
APTT PPP: 27 SECONDS (ref 23–34)
AST SERPL W P-5'-P-CCNC: 31 U/L (ref 13–39)
ATRIAL RATE: 85 BPM
BACTERIA UR QL AUTO: ABNORMAL /HPF
BARBITURATES UR QL: NEGATIVE
BASOPHILS # BLD AUTO: 0.06 THOUSANDS/ÂΜL (ref 0–0.1)
BASOPHILS NFR BLD AUTO: 1 % (ref 0–1)
BENZODIAZ UR QL: NEGATIVE
BILIRUB SERPL-MCNC: 0.32 MG/DL (ref 0.2–1)
BILIRUB UR QL STRIP: NEGATIVE
BUN SERPL-MCNC: 11 MG/DL (ref 5–25)
CALCIUM SERPL-MCNC: 9.2 MG/DL (ref 8.4–10.2)
CHLORIDE SERPL-SCNC: 105 MMOL/L (ref 96–108)
CLARITY UR: CLEAR
CO2 SERPL-SCNC: 27 MMOL/L (ref 21–32)
COCAINE UR QL: NEGATIVE
COLOR UR: YELLOW
CREAT SERPL-MCNC: 0.6 MG/DL (ref 0.6–1.3)
EOSINOPHIL # BLD AUTO: 0.01 THOUSAND/ÂΜL (ref 0–0.61)
EOSINOPHIL NFR BLD AUTO: 0 % (ref 0–6)
ERYTHROCYTE [DISTWIDTH] IN BLOOD BY AUTOMATED COUNT: 16.8 % (ref 11.6–15.1)
ETHANOL SERPL-MCNC: 350 MG/DL
EXT PREGNANCY TEST URINE: NEGATIVE
EXT. CONTROL: NORMAL
FENTANYL UR QL SCN: NEGATIVE
FIBRINOGEN PPP-MCNC: 310 MG/DL (ref 206–523)
GFR SERPL CREATININE-BSD FRML MDRD: 116 ML/MIN/1.73SQ M
GLUCOSE SERPL-MCNC: 91 MG/DL (ref 65–140)
GLUCOSE UR STRIP-MCNC: NEGATIVE MG/DL
HCT VFR BLD AUTO: 44.7 % (ref 34.8–46.1)
HGB BLD-MCNC: 15 G/DL (ref 11.5–15.4)
HGB UR QL STRIP.AUTO: NEGATIVE
HYDROCODONE UR QL SCN: NEGATIVE
IMM GRANULOCYTES # BLD AUTO: 0.05 THOUSAND/UL (ref 0–0.2)
IMM GRANULOCYTES NFR BLD AUTO: 1 % (ref 0–2)
INR PPP: 0.82 (ref 0.85–1.19)
KETONES UR STRIP-MCNC: NEGATIVE MG/DL
LEUKOCYTE ESTERASE UR QL STRIP: NEGATIVE
LIPASE SERPL-CCNC: 82 U/L (ref 11–82)
LYMPHOCYTES # BLD AUTO: 4.27 THOUSANDS/ÂΜL (ref 0.6–4.47)
LYMPHOCYTES NFR BLD AUTO: 46 % (ref 14–44)
MAGNESIUM SERPL-MCNC: 2.1 MG/DL (ref 1.9–2.7)
MCH RBC QN AUTO: 34.5 PG (ref 26.8–34.3)
MCHC RBC AUTO-ENTMCNC: 33.6 G/DL (ref 31.4–37.4)
MCV RBC AUTO: 103 FL (ref 82–98)
METHADONE UR QL: NEGATIVE
MONOCYTES # BLD AUTO: 0.74 THOUSAND/ÂΜL (ref 0.17–1.22)
MONOCYTES NFR BLD AUTO: 8 % (ref 4–12)
MUCOUS THREADS UR QL AUTO: ABNORMAL
NEUTROPHILS # BLD AUTO: 4.02 THOUSANDS/ÂΜL (ref 1.85–7.62)
NEUTS SEG NFR BLD AUTO: 44 % (ref 43–75)
NITRITE UR QL STRIP: NEGATIVE
NON-SQ EPI CELLS URNS QL MICRO: ABNORMAL /HPF
NRBC BLD AUTO-RTO: 0 /100 WBCS
OPIATES UR QL SCN: NEGATIVE
OTHER STN SPEC: ABNORMAL
OXYCODONE+OXYMORPHONE UR QL SCN: NEGATIVE
P AXIS: 69 DEGREES
PCP UR QL: NEGATIVE
PH UR STRIP.AUTO: 5 [PH]
PHOSPHATE SERPL-MCNC: 4.1 MG/DL (ref 2.7–4.5)
PLATELET # BLD AUTO: 180 THOUSANDS/UL (ref 149–390)
PMV BLD AUTO: 9.4 FL (ref 8.9–12.7)
POTASSIUM SERPL-SCNC: 4.1 MMOL/L (ref 3.5–5.3)
PR INTERVAL: 134 MS
PROT SERPL-MCNC: 7.8 G/DL (ref 6.4–8.4)
PROT UR STRIP-MCNC: ABNORMAL MG/DL
PROTHROMBIN TIME: 11.8 SECONDS (ref 12.3–15)
QRS AXIS: 81 DEGREES
QRSD INTERVAL: 82 MS
QT INTERVAL: 370 MS
QTC INTERVAL: 440 MS
RBC # BLD AUTO: 4.35 MILLION/UL (ref 3.81–5.12)
RBC #/AREA URNS AUTO: ABNORMAL /HPF
SODIUM SERPL-SCNC: 143 MMOL/L (ref 135–147)
SP GR UR STRIP.AUTO: 1.01 (ref 1–1.03)
T WAVE AXIS: 39 DEGREES
THC UR QL: POSITIVE
THROMBIN TIME: 18.2 SECONDS (ref 14.7–18.4)
TSH SERPL DL<=0.05 MIU/L-ACNC: 0.93 UIU/ML (ref 0.45–4.5)
UROBILINOGEN UR STRIP-ACNC: <2 MG/DL
VENTRICULAR RATE: 85 BPM
WBC # BLD AUTO: 9.15 THOUSAND/UL (ref 4.31–10.16)
WBC #/AREA URNS AUTO: ABNORMAL /HPF

## 2025-05-10 PROCEDURE — 96365 THER/PROPH/DIAG IV INF INIT: CPT

## 2025-05-10 PROCEDURE — 99284 EMERGENCY DEPT VISIT MOD MDM: CPT

## 2025-05-10 PROCEDURE — 85670 THROMBIN TIME PLASMA: CPT

## 2025-05-10 PROCEDURE — 84100 ASSAY OF PHOSPHORUS: CPT

## 2025-05-10 PROCEDURE — 82077 ASSAY SPEC XCP UR&BREATH IA: CPT

## 2025-05-10 PROCEDURE — 80053 COMPREHEN METABOLIC PANEL: CPT

## 2025-05-10 PROCEDURE — 82075 ASSAY OF BREATH ETHANOL: CPT

## 2025-05-10 PROCEDURE — 83735 ASSAY OF MAGNESIUM: CPT

## 2025-05-10 PROCEDURE — 85610 PROTHROMBIN TIME: CPT

## 2025-05-10 PROCEDURE — 85384 FIBRINOGEN ACTIVITY: CPT

## 2025-05-10 PROCEDURE — 81025 URINE PREGNANCY TEST: CPT

## 2025-05-10 PROCEDURE — 83690 ASSAY OF LIPASE: CPT

## 2025-05-10 PROCEDURE — 81001 URINALYSIS AUTO W/SCOPE: CPT

## 2025-05-10 PROCEDURE — 70450 CT HEAD/BRAIN W/O DYE: CPT

## 2025-05-10 PROCEDURE — 36415 COLL VENOUS BLD VENIPUNCTURE: CPT

## 2025-05-10 PROCEDURE — 74176 CT ABD & PELVIS W/O CONTRAST: CPT

## 2025-05-10 PROCEDURE — 84443 ASSAY THYROID STIM HORMONE: CPT

## 2025-05-10 PROCEDURE — 80307 DRUG TEST PRSMV CHEM ANLYZR: CPT

## 2025-05-10 PROCEDURE — 93005 ELECTROCARDIOGRAM TRACING: CPT

## 2025-05-10 PROCEDURE — 96375 TX/PRO/DX INJ NEW DRUG ADDON: CPT

## 2025-05-10 PROCEDURE — 85732 THROMBOPLASTIN TIME PARTIAL: CPT

## 2025-05-10 PROCEDURE — 85025 COMPLETE CBC W/AUTO DIFF WBC: CPT

## 2025-05-10 PROCEDURE — 96361 HYDRATE IV INFUSION ADD-ON: CPT

## 2025-05-10 PROCEDURE — 85730 THROMBOPLASTIN TIME PARTIAL: CPT

## 2025-05-10 PROCEDURE — 99285 EMERGENCY DEPT VISIT HI MDM: CPT

## 2025-05-10 RX ORDER — LORAZEPAM 2 MG/ML
1 INJECTION INTRAMUSCULAR ONCE
Status: COMPLETED | OUTPATIENT
Start: 2025-05-10 | End: 2025-05-10

## 2025-05-10 RX ADMIN — LORAZEPAM 1 MG: 2 INJECTION INTRAMUSCULAR; INTRAVENOUS at 20:19

## 2025-05-10 RX ADMIN — THIAMINE HYDROCHLORIDE: 100 INJECTION, SOLUTION INTRAMUSCULAR; INTRAVENOUS at 20:33

## 2025-05-10 RX ADMIN — SODIUM CHLORIDE 1000 ML: 0.9 INJECTION, SOLUTION INTRAVENOUS at 19:56

## 2025-05-11 ENCOUNTER — PATIENT OUTREACH (OUTPATIENT)
Facility: HOSPITAL | Age: 40
End: 2025-05-11

## 2025-05-11 VITALS
TEMPERATURE: 97 F | SYSTOLIC BLOOD PRESSURE: 157 MMHG | HEIGHT: 65 IN | RESPIRATION RATE: 18 BRPM | HEART RATE: 115 BPM | DIASTOLIC BLOOD PRESSURE: 80 MMHG | BODY MASS INDEX: 37.99 KG/M2 | OXYGEN SATURATION: 94 % | WEIGHT: 228 LBS

## 2025-05-11 LAB
APTT PPP: 28 SECONDS (ref 23–34)
ETHANOL EXG-MCNC: 0.53 MG/DL
THROMBIN TIME: 17.2 SECONDS (ref 14.7–18.4)

## 2025-05-11 PROCEDURE — 96376 TX/PRO/DX INJ SAME DRUG ADON: CPT

## 2025-05-11 PROCEDURE — 36415 COLL VENOUS BLD VENIPUNCTURE: CPT | Performed by: EMERGENCY MEDICINE

## 2025-05-11 RX ORDER — LORAZEPAM 2 MG/ML
2 INJECTION INTRAMUSCULAR ONCE
Status: COMPLETED | OUTPATIENT
Start: 2025-05-11 | End: 2025-05-11

## 2025-05-11 RX ADMIN — LORAZEPAM 2 MG: 2 INJECTION INTRAMUSCULAR; INTRAVENOUS at 05:40
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
## 2025-05-11 NOTE — PROGRESS NOTES
[facility] Warm Handoff Outcome Note    Patient name Susan Barrett  Location Room/bed info not found MRN [id number]  Age: 40 y.o.          Plan Type:  Warm Handoff                                                                                    Plan Date: 5/11/2025  Service:  ED Warm Handoff      Substance Use History:  Alcohol     Warm Handoff Update:  Pt will f/u with OP services    Warm Handoff Outcome: Treatment Related Resources
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
## 2025-05-11 NOTE — ED CARE HANDOFF
Emergency Department Sign Out Note        Sign out and transfer of care from SUNITA Faustin. See Separate Emergency Department note.     The patient, Susan Barrett, was evaluated by the previous provider for ETOH use, vague SI.    Workup Completed:  Medically cleared and sober, received ativan for ciwa score    ED Course / Workup Pending (followup):  Crisis consulted for dual diagnosis - seen by [facility] crisis - sober and no longer SI.  Would like to go home.  [facility]ES will call her mobile.  picking her up        No data recorded                             Procedures  Medical Decision Making  Amount and/or Complexity of Data Reviewed  Labs: ordered.  Radiology: ordered.    Risk  OTC drugs.  Prescription drug management.            Disposition  Final diagnoses:   Suicidal ideations   Alcohol use     Time reflects when diagnosis was documented in both MDM as applicable and the Disposition within this note       Time User Action Codes Description Comment    5/11/2025  9:28 AM Jr Weiss [R45.851] Suicidal ideations     5/11/2025  9:29 AM Jr Weiss [F10.90] Alcohol use           ED Disposition       ED Disposition   Discharge    Condition   Stable    Date/Time   Sun May 11, 2025  9:28 AM    Comment   Susan Barrett discharge to home/self care.                   MD Documentation      Flowsheet Row Most Recent Value   Sending MD DR. KEIRY WEISS          Follow-up Information    None       Discharge Medication List as of 5/11/2025  9:30 AM        CONTINUE these medications which have NOT CHANGED    Details   bisoprolol (ZEBETA) 10 MG tablet Take 1 tablet (10 mg total) by mouth daily, Starting Fri 9/15/2023, Normal      Doxylamine Succinate, Sleep, (UNISOM PO) Take by mouth, Historical Med      fluticasone (FLONASE) 50 mcg/act nasal spray 1 spray into each nostril daily, Historical Med      hydrOXYzine pamoate (VISTARIL) 50 mg capsule TAKE 1 CAPSULE BY MOUTH EVERY DAY AS NEEDED NO MORE THAN 6 CAPSULES IN 24 HOURS,  Historical Med      levothyroxine 50 mcg tablet TAKE 1 TABLET BY MOUTH EVERY DAY, Starting Fri 12/27/2024, Normal      Multiple Vitamins-Minerals (MULTIVITAMIN ADULT PO) Take 1 tablet by mouth daily, Historical Med      pyridoxine (VITAMIN B6) 100 mg tablet Take 100 mg by mouth daily, Historical Med      Tranexamic Acid 650 MG TABS Take 2 tablets (1,300 mg total) by mouth 3 (three) times a day During heavy days of menstrual cycle, maximum 5 days of use per month, Starting Tue 8/16/2022, Normal      traZODone (DESYREL) 100 mg tablet TAKE 1 TABLET BY MOUTH EVERYDAY AT BEDTIME, Starting Wed 4/2/2025, Normal      venlafaxine (EFFEXOR-XR) 150 mg 24 hr capsule TAKE 1 CAPSULE BY MOUTH EVERY DAY, Starting Wed 4/2/2025, Normal           No discharge procedures on file.       ED Provider  Electronically Signed by     Jr Watlon DO  05/11/25 5059

## 2025-05-11 NOTE — DISCHARGE INSTRUCTIONS
Discharge and Safety Plan    This writer discussed the patients current presentation and recommended discharge plan with Dr. Walton.  They agree with the patient being discharged at this time with referrals and/or information about Depression, and Alcohol abuse     The patient was Instructed to follow up with, provided referral information for:   [facility]   [address] [place] [address] [phone]     [facility] [address]  [place] [address]    [address] [facility] [address]  [place] [address] [phone]       This writer and the patient completed a safety plan.  The patient was provided with a copy of their safety plan with encouragement to utilize the plan following discharge.     In addition, the patient was instructed to call local [facility] crisis, other crisis services, [facility] or to go to the nearest ER immediately if their situation changes at any time.       This writer discussed discharge plans with the patient, who agrees with and understands the discharge plans.         SAFETY PLAN  Warning Signs (thoughts, images, mood, behavior, situations) of a potential crisis: feeling sad, drinking       Coping Skills (what can I do to take my mind off the problem, or to keep myself safe): talk to someone       Outside Support (who can I reach out to for support and help): call a therapist         National Suicide Prevention Hotline:  [address] [place] [address] [phone] - [facility] [phone] - LVF Crisis/Mobile Crisis   [phone] - SLPF Crisis   [facility]: [phone]  [facility]: [phone]   [facility] [phone] - Crisis   [facility] [phone] - Crisis     [facility] [phone] - Crisis   [facility] [phone] - Crisis   [phone] - Peer Support Talk Line (1-9pm daily)  [phone] - Teen Support Talk Line (1-9pm daily)  [phone] - [facility] [phone]- Crisis    [facility]  [phone] - Crisis   [facility] [phone] - Crisis    [facility] [phone] - [facility]

## 2025-05-12 LAB
ATRIAL RATE: 85 BPM
P AXIS: 69 DEGREES
PR INTERVAL: 134 MS
QRS AXIS: 81 DEGREES
QRSD INTERVAL: 82 MS
QT INTERVAL: 370 MS
QTC INTERVAL: 440 MS
T WAVE AXIS: 39 DEGREES
VENTRICULAR RATE: 85 BPM

## 2025-05-12 PROCEDURE — 93010 ELECTROCARDIOGRAM REPORT: CPT | Performed by: INTERNAL MEDICINE

## 2025-06-21 DIAGNOSIS — F41.1 GENERALIZED ANXIETY DISORDER: ICD-10-CM

## 2025-06-23 RX ORDER — TRAZODONE HYDROCHLORIDE 100 MG/1
100 TABLET ORAL
Qty: 30 TABLET | Refills: 0 | OUTPATIENT
Start: 2025-06-23

## 2025-07-03 DIAGNOSIS — E03.9 HYPOTHYROIDISM, UNSPECIFIED TYPE: ICD-10-CM

## 2025-07-03 RX ORDER — LEVOTHYROXINE SODIUM 50 UG/1
50 TABLET ORAL DAILY
Qty: 90 TABLET | Refills: 1 | OUTPATIENT
Start: 2025-07-03

## 2025-07-13 ENCOUNTER — HOSPITAL ENCOUNTER (EMERGENCY)
Facility: HOSPITAL | Age: 40
Discharge: HOME | End: 2025-07-13
Attending: EMERGENCY MEDICINE | Admitting: EMERGENCY MEDICINE
Payer: COMMERCIAL

## 2025-07-13 VITALS
RESPIRATION RATE: 20 BRPM | OXYGEN SATURATION: 96 % | TEMPERATURE: 97.3 F | DIASTOLIC BLOOD PRESSURE: 79 MMHG | HEART RATE: 102 BPM | SYSTOLIC BLOOD PRESSURE: 124 MMHG

## 2025-07-13 DIAGNOSIS — F10.929 ALCOHOL INTOXICATION (HCC): Primary | ICD-10-CM

## 2025-07-13 DIAGNOSIS — F10.10 CHRONIC ALCOHOL ABUSE: ICD-10-CM

## 2025-07-13 DIAGNOSIS — R45.851 SUICIDE IDEATION: ICD-10-CM

## 2025-07-13 DIAGNOSIS — F32.A DEPRESSION: ICD-10-CM

## 2025-07-13 LAB
ALBUMIN SERPL BCG-MCNC: 4.5 G/DL (ref 3.5–5)
ALP SERPL-CCNC: 57 U/L (ref 34–104)
ALT SERPL W P-5'-P-CCNC: 10 U/L (ref 7–52)
AMPHETAMINES SERPL QL SCN: NEGATIVE
ANION GAP SERPL CALCULATED.3IONS-SCNC: 10 MMOL/L (ref 4–13)
AST SERPL W P-5'-P-CCNC: 14 U/L (ref 13–39)
BARBITURATES UR QL: NEGATIVE
BASOPHILS # BLD AUTO: 0.06 THOUSANDS/ÂΜL (ref 0–0.1)
BASOPHILS NFR BLD AUTO: 1 % (ref 0–1)
BENZODIAZ UR QL: NEGATIVE
BILIRUB SERPL-MCNC: 0.29 MG/DL (ref 0.2–1)
BUN SERPL-MCNC: 10 MG/DL (ref 5–25)
CALCIUM SERPL-MCNC: 8.8 MG/DL (ref 8.4–10.2)
CHLORIDE SERPL-SCNC: 107 MMOL/L (ref 96–108)
CO2 SERPL-SCNC: 24 MMOL/L (ref 21–32)
COCAINE UR QL: NEGATIVE
CREAT SERPL-MCNC: 0.59 MG/DL (ref 0.6–1.3)
EOSINOPHIL # BLD AUTO: 0.03 THOUSAND/ÂΜL (ref 0–0.61)
EOSINOPHIL NFR BLD AUTO: 0 % (ref 0–6)
ERYTHROCYTE [DISTWIDTH] IN BLOOD BY AUTOMATED COUNT: 14.7 % (ref 11.6–15.1)
ETHANOL EXG-MCNC: 0.09 MG/DL
ETHANOL SERPL-MCNC: 269 MG/DL
ETHANOL SERPL-MCNC: 378 MG/DL
FENTANYL UR QL SCN: NEGATIVE
GFR SERPL CREATININE-BSD FRML MDRD: 116 ML/MIN/1.73SQ M
GLUCOSE SERPL-MCNC: 95 MG/DL (ref 65–140)
HCT VFR BLD AUTO: 45.3 % (ref 34.8–46.1)
HGB BLD-MCNC: 15.5 G/DL (ref 11.5–15.4)
HYDROCODONE UR QL SCN: NEGATIVE
IMM GRANULOCYTES # BLD AUTO: 0.03 THOUSAND/UL (ref 0–0.2)
IMM GRANULOCYTES NFR BLD AUTO: 0 % (ref 0–2)
LYMPHOCYTES # BLD AUTO: 3.66 THOUSANDS/ÂΜL (ref 0.6–4.47)
LYMPHOCYTES NFR BLD AUTO: 38 % (ref 14–44)
MCH RBC QN AUTO: 32.4 PG (ref 26.8–34.3)
MCHC RBC AUTO-ENTMCNC: 34.2 G/DL (ref 31.4–37.4)
MCV RBC AUTO: 95 FL (ref 82–98)
METHADONE UR QL: NEGATIVE
MONOCYTES # BLD AUTO: 0.67 THOUSAND/ÂΜL (ref 0.17–1.22)
MONOCYTES NFR BLD AUTO: 7 % (ref 4–12)
NEUTROPHILS # BLD AUTO: 5.18 THOUSANDS/ÂΜL (ref 1.85–7.62)
NEUTS SEG NFR BLD AUTO: 54 % (ref 43–75)
NRBC BLD AUTO-RTO: 0 /100 WBCS
OPIATES UR QL SCN: NEGATIVE
OXYCODONE+OXYMORPHONE UR QL SCN: NEGATIVE
PCP UR QL: NEGATIVE
PLATELET # BLD AUTO: 269 THOUSANDS/UL (ref 149–390)
PMV BLD AUTO: 9.6 FL (ref 8.9–12.7)
POTASSIUM SERPL-SCNC: 3.9 MMOL/L (ref 3.5–5.3)
PROT SERPL-MCNC: 8 G/DL (ref 6.4–8.4)
RBC # BLD AUTO: 4.78 MILLION/UL (ref 3.81–5.12)
SODIUM SERPL-SCNC: 141 MMOL/L (ref 135–147)
THC UR QL: POSITIVE
TSH SERPL DL<=0.05 MIU/L-ACNC: 1.28 UIU/ML (ref 0.45–4.5)
WBC # BLD AUTO: 9.63 THOUSAND/UL (ref 4.31–10.16)

## 2025-07-13 PROCEDURE — 96376 TX/PRO/DX INJ SAME DRUG ADON: CPT

## 2025-07-13 PROCEDURE — 80053 COMPREHEN METABOLIC PANEL: CPT

## 2025-07-13 PROCEDURE — 82075 ASSAY OF BREATH ETHANOL: CPT | Performed by: EMERGENCY MEDICINE

## 2025-07-13 PROCEDURE — 85025 COMPLETE CBC W/AUTO DIFF WBC: CPT

## 2025-07-13 PROCEDURE — 82077 ASSAY SPEC XCP UR&BREATH IA: CPT | Performed by: EMERGENCY MEDICINE

## 2025-07-13 PROCEDURE — 96366 THER/PROPH/DIAG IV INF ADDON: CPT

## 2025-07-13 PROCEDURE — 81025 URINE PREGNANCY TEST: CPT

## 2025-07-13 PROCEDURE — 99285 EMERGENCY DEPT VISIT HI MDM: CPT

## 2025-07-13 PROCEDURE — 36415 COLL VENOUS BLD VENIPUNCTURE: CPT

## 2025-07-13 PROCEDURE — 84443 ASSAY THYROID STIM HORMONE: CPT | Performed by: EMERGENCY MEDICINE

## 2025-07-13 PROCEDURE — 96372 THER/PROPH/DIAG INJ SC/IM: CPT

## 2025-07-13 PROCEDURE — 82077 ASSAY SPEC XCP UR&BREATH IA: CPT

## 2025-07-13 PROCEDURE — 96375 TX/PRO/DX INJ NEW DRUG ADDON: CPT

## 2025-07-13 PROCEDURE — 96365 THER/PROPH/DIAG IV INF INIT: CPT

## 2025-07-13 PROCEDURE — 99284 EMERGENCY DEPT VISIT MOD MDM: CPT | Performed by: EMERGENCY MEDICINE

## 2025-07-13 PROCEDURE — 80307 DRUG TEST PRSMV CHEM ANLYZR: CPT

## 2025-07-13 RX ORDER — NICOTINE 21 MG/24HR
21 PATCH, TRANSDERMAL 24 HOURS TRANSDERMAL ONCE
Status: DISCONTINUED | OUTPATIENT
Start: 2025-07-13 | End: 2025-07-13 | Stop reason: HOSPADM

## 2025-07-13 RX ORDER — LORAZEPAM 2 MG/ML
2 INJECTION INTRAMUSCULAR ONCE
Status: COMPLETED | OUTPATIENT
Start: 2025-07-13 | End: 2025-07-13

## 2025-07-13 RX ORDER — DIAZEPAM 10 MG/2ML
5 INJECTION, SOLUTION INTRAMUSCULAR; INTRAVENOUS EVERY 4 HOURS PRN
Status: DISCONTINUED | OUTPATIENT
Start: 2025-07-13 | End: 2025-07-13 | Stop reason: HOSPADM

## 2025-07-13 RX ORDER — DIAZEPAM 10 MG/2ML
5 INJECTION, SOLUTION INTRAMUSCULAR; INTRAVENOUS ONCE
Status: COMPLETED | OUTPATIENT
Start: 2025-07-13 | End: 2025-07-13

## 2025-07-13 RX ORDER — OLANZAPINE 10 MG/2ML
10 INJECTION, POWDER, FOR SOLUTION INTRAMUSCULAR ONCE
Status: COMPLETED | OUTPATIENT
Start: 2025-07-13 | End: 2025-07-13

## 2025-07-13 RX ADMIN — DIAZEPAM 5 MG: 10 INJECTION, SOLUTION INTRAMUSCULAR; INTRAVENOUS at 11:24

## 2025-07-13 RX ADMIN — DIAZEPAM 5 MG: 10 INJECTION, SOLUTION INTRAMUSCULAR; INTRAVENOUS at 15:00

## 2025-07-13 RX ADMIN — OLANZAPINE 10 MG: 10 INJECTION, POWDER, LYOPHILIZED, FOR SOLUTION INTRAMUSCULAR at 15:44

## 2025-07-13 RX ADMIN — NICOTINE 21 MG: 21 PATCH, EXTENDED RELEASE TRANSDERMAL at 15:25

## 2025-07-13 RX ADMIN — LORAZEPAM 2 MG: 2 INJECTION INTRAMUSCULAR; INTRAVENOUS at 10:18

## 2025-07-13 RX ADMIN — THIAMINE HYDROCHLORIDE 100 MG: 100 INJECTION, SOLUTION INTRAMUSCULAR; INTRAVENOUS at 10:18

## 2025-07-13 NOTE — ED NOTES
"Upon arrival back to room, this RN apologized to patient for calling her pal. This RN told patient that it was meant to be friendly. This RN told patient that she did not appreciate being called \"cunt\". Patient stated \" I can meet you detention and apologize for calling you a cunt\".      Jocelyn Cuevas RN  07/13/25 8749    "

## 2025-07-13 NOTE — ED NOTES
Patient sleeping at this time. POCT breath test will be completed when patient wakes up, patient has been challenging all day and has not slept.      Jocelyn Cuevas RN  07/13/25 9411

## 2025-07-13 NOTE — ED NOTES
This RN attempted to call patients  per patient request. Patients spouse did not answer the phone. Voicemail left at this time      Jocelyn Cuevas RN  07/13/25 9803

## 2025-07-13 NOTE — ED NOTES
Pt requested that we call her  as she would like to speak with him. Rn called  and asked him if he wanted to talk to her.  declined and stated that he didn't want to talk to pt at this time.      Sofya Jones RN  07/13/25 1867

## 2025-07-13 NOTE — ED NOTES
RN called state police to do a welfare check on pt children. Pt is stating that  is at home getting drunk while caring for children. Officer stated that they will look into it      Sofya Jones RN  07/13/25 8890

## 2025-07-13 NOTE — ED NOTES
This RN called patients sister. Patient sister states patient has not talked to her in over a year. Sister tried to redirect patient and encourage her to stay here while sobering up. Patients sister voiced concerns to this RN about the safety of her kids at home with . Patients sister asked if we (this RN) was able to call the police to do a welfare check on the children. Charge RN contacting police at this time. Patient informed of this. Provider at bedside.     Jocelyn Cuevas RN  07/13/25 0084

## 2025-07-13 NOTE — ED NOTES
Officer called back and stated that they went to house and  did not look like he was drinking or had been drinking.      Sofya Jones RN  07/13/25 6255

## 2025-07-13 NOTE — ED NOTES
"This RN attempted to walk patient to the bathroom. This RN stated \" lets go to the bathroom pal!\". Patient turned around and stated \"you're a cunt\". This RN told patient that it was not derogatory and that it was not meant to upset patient\". Patient screamed at RN and was assisted to bathroom by charge RN     Jocelyn Cuevas, JOSE RAMON  07/13/25 5360    "

## 2025-07-13 NOTE — ED NOTES
Pt  called back and will not be coming in at this time. Husbanded stated that he doesn't want pt home intoxicated around there children.  stated that she can stay here until she is sober      Sofya Jones RN  07/13/25 3985

## 2025-07-13 NOTE — ED TRIAGE NOTES
"Pt arrives with [facility] Police.  Pt stated she is has been drinking but is unsure how much she drank.  Pt stated she wishes to be dead.  Pt stated, \" I want to die\" denies pain.   "

## 2025-07-13 NOTE — ED PROVIDER NOTES
Time reflects when diagnosis was documented in both MDM as applicable and the Disposition within this note       Time User Action Codes Description Comment    7/13/2025  9:59 AM Shailesh Henry Add [F10.10] Alcohol abuse     7/13/2025  9:59 AM Shailesh Henry [F32.A] Depression           ED Disposition       None          Assessment & Plan       Medical Decision Making  Alcohol abuse with suicidal ideation will check basic labs continue to monitor CIWA precautions    Amount and/or Complexity of Data Reviewed  Labs: ordered.    Risk  OTC drugs.  Prescription drug management.             Medications   thiamine (VITAMIN B1) 100 mg in dextrose 5 % 100 mL IVPB (0 mg Intravenous Stopped 7/13/25 1258)   diazepam (VALIUM) injection 5 mg (has no administration in time range)   nicotine (NICODERM CQ) 21 mg/24 hr TD 24 hr patch 21 mg (0 mg Transdermal Hold 7/13/25 1245)   LORazepam (ATIVAN) injection 2 mg (2 mg Intravenous Given 7/13/25 1018)   diazepam (VALIUM) injection 5 mg (5 mg Intravenous Given 7/13/25 1124)       ED Risk Strat Scores                    No data recorded                            History of Present Illness       Chief Complaint   Patient presents with    Mental Health Problem       Past Medical History[1]   Past Surgical History[2]   Family History[3]   Social History[4]   E-Cigarette/Vaping    E-Cigarette Use Current Some Day User       E-Cigarette/Vaping Substances    Nicotine No     THC Yes     CBD No     Flavoring No     Other No     Unknown No       I have reviewed and agree with the history as documented.     40-year-old female from home reportedly called police because of alcohol abuse and suicidal ideation she was seen here approximately 2 months ago for similar complaints she complains of nausea      History provided by:  Patient and police  Medical Problem  Location:  Generalized  Quality:  Intoxication  Severity:  Unable to specify  Onset quality:  Unable to specify  Timing:   Constant  Progression:  Waxing and waning  Chronicity:  Recurrent  Context:  Alcohol intoxication with suicidal ideation  Worsened by:  Alcohol abuse      Review of Systems   Psychiatric/Behavioral:  Positive for suicidal ideas.    All other systems reviewed and are negative.          Objective       ED Triage Vitals   Temperature Pulse Blood Pressure Respirations SpO2 Patient Position - Orthostatic VS   07/13/25 1024 07/13/25 0931 07/13/25 0931 07/13/25 0931 07/13/25 0931 07/13/25 0931   (!) 97.3 °F (36.3 °C) (!) 136 147/96 20 96 % Sitting      Temp Source Heart Rate Source BP Location FiO2 (%) Pain Score    07/13/25 1024 07/13/25 0931 07/13/25 0931 -- 07/13/25 0931    Temporal Monitor Right arm  No Pain      Vitals      Date and Time Temp Pulse SpO2 Resp BP Pain Score FACES Pain Rating User   07/13/25 1300 -- 98 93 % 20 98/53 -- -- AM   07/13/25 1245 -- 97 92 % 20 123/58 -- -- AM   07/13/25 1215 -- 105 91 % 20 168/98 -- -- AM   07/13/25 1200 -- 112 93 % 20 160/107 -- -- AM   07/13/25 1047 -- 118 95 % 20 161/101 -- -- AM   07/13/25 1024 97.3 °F (36.3 °C) -- -- -- -- -- -- AM   07/13/25 1000 -- 103 96 % 20 132/84 -- -- AM   07/13/25 0931 -- 136 96 % 20 147/96 No Pain -- SS            Physical Exam  Vitals and nursing note reviewed.   Constitutional:       Appearance: She is not toxic-appearing.      Comments: Intoxicated verbally interactive no vomiting or airway compromise   HENT:      Right Ear: External ear normal.      Left Ear: External ear normal.     Eyes:      General:         Right eye: No discharge.         Left eye: No discharge.      Extraocular Movements: Extraocular movements intact.      Comments: Pupils dilated 4 mm bilateral     Cardiovascular:      Rate and Rhythm: Regular rhythm. Tachycardia present.      Pulses: Normal pulses.      Heart sounds: No murmur heard.     No friction rub. No gallop.   Pulmonary:      Effort: No respiratory distress.      Breath sounds: No stridor. No wheezing, rhonchi  or rales.   Abdominal:      General: There is no distension.      Palpations: Abdomen is soft.      Tenderness: There is no abdominal tenderness. There is no rebound.     Musculoskeletal:         General: No swelling, tenderness, deformity or signs of injury.      Cervical back: Neck supple. No rigidity.      Right lower leg: No edema.      Left lower leg: No edema.     Skin:     General: Skin is warm and dry.      Coloration: Skin is not jaundiced.      Findings: No bruising, erythema or rash.     Neurological:      Mental Status: She is oriented to person, place, and time.      Cranial Nerves: No cranial nerve deficit.      Coordination: Coordination abnormal.     Psychiatric:      Comments: Depressed with suicidal ideation no specific plan         Results Reviewed       Procedure Component Value Units Date/Time    TSH, 3rd generation with Free T4 reflex [[id number]]  (Normal) Collected: 07/13/25 0942    Lab Status: Final result Specimen: Blood from Arm, Right Updated: 07/13/25 1105     TSH 3RD GENERATION 1.284 uIU/mL     Rapid drug screen, urine [[id number]]  (Abnormal) Collected: 07/13/25 1047    Lab Status: Final result Specimen: Urine, Clean Catch Updated: 07/13/25 1104     Amph/Meth UR Negative     Barbiturate Ur Negative     Benzodiazepine Urine Negative     Cocaine Urine Negative     Methadone Urine Negative     Opiate Urine Negative     PCP Ur Negative     THC Urine Positive     Oxycodone Urine Negative     Fentanyl Urine Negative     HYDROCODONE URINE Negative    Narrative:      Presumptive report. If requested, specimen will be sent to reference lab for confirmation.  FOR MEDICAL PURPOSES ONLY.   IF CONFIRMATION NEEDED PLEASE CONTACT THE LAB WITHIN 5 DAYS.    Drug Screen Cutoff Levels:  AMPHETAMINE/METHAMPHETAMINES  1000 ng/mL  BARBITURATES     200 ng/mL  BENZODIAZEPINES     200 ng/mL  COCAINE      300 ng/mL  METHADONE      300 ng/mL  OPIATES      300 ng/mL  PHENCYCLIDINE     25 ng/mL  THC       50  ng/mL  OXYCODONE      100 ng/mL  FENTANYL      5 ng/mL  HYDROCODONE     300 ng/mL    POCT pregnancy, urine [[id number]]  (Normal) Collected: 07/13/25 1046    Lab Status: Final result Specimen: Urine Updated: 07/13/25 1050     EXT Preg Test, Ur --     Control --    Comprehensive metabolic panel [[id number]]  (Abnormal) Collected: 07/13/25 0942    Lab Status: Final result Specimen: Blood from Arm, Right Updated: 07/13/25 1017     Sodium 141 mmol/L      Potassium 3.9 mmol/L      Chloride 107 mmol/L      CO2 24 mmol/L      ANION GAP 10 mmol/L      BUN 10 mg/dL      Creatinine 0.59 mg/dL      Glucose 95 mg/dL      Calcium 8.8 mg/dL      AST 14 U/L      ALT 10 U/L      Alkaline Phosphatase 57 U/L      Total Protein 8.0 g/dL      Albumin 4.5 g/dL      Total Bilirubin 0.29 mg/dL      eGFR 116 ml/min/1.73sq m     Narrative:      National Kidney Disease Foundation guidelines for Chronic Kidney Disease (CKD):     Stage 1 with normal or high GFR (GFR > 90 mL/min/1.73 square meters)    Stage 2 Mild CKD (GFR = 60-89 mL/min/1.73 square meters)    Stage 3A Moderate CKD (GFR = 45-59 mL/min/1.73 square meters)    Stage 3B Moderate CKD (GFR = 30-44 mL/min/1.73 square meters)    Stage 4 Severe CKD (GFR = 15-29 mL/min/1.73 square meters)    Stage 5 End Stage CKD (GFR <15 mL/min/1.73 square meters)  Note: GFR calculation is accurate only with a steady state creatinine    Ethanol [[id number]]  (Abnormal) Collected: 07/13/25 0942    Lab Status: Final result Specimen: Blood from Arm, Right Updated: 07/13/25 1016     Ethanol Lvl 378 mg/dL     CBC and differential [[id number]]  (Abnormal) Collected: 07/13/25 0942    Lab Status: Final result Specimen: Blood from Arm, Right Updated: 07/13/25 0947     WBC 9.63 Thousand/uL      RBC 4.78 Million/uL      Hemoglobin 15.5 g/dL      Hematocrit 45.3 %      MCV 95 fL      MCH 32.4 pg      MCHC 34.2 g/dL      RDW 14.7 %      MPV 9.6 fL      Platelets 269 Thousands/uL      nRBC 0 /100 WBCs      Segmented % 54  %      Immature Grans % 0 %      Lymphocytes % 38 %      Monocytes % 7 %      Eosinophils Relative 0 %      Basophils Relative 1 %      Absolute Neutrophils 5.18 Thousands/µL      Absolute Immature Grans 0.03 Thousand/uL      Absolute Lymphocytes 3.66 Thousands/µL      Absolute Monocytes 0.67 Thousand/µL      Eosinophils Absolute 0.03 Thousand/µL      Basophils Absolute 0.06 Thousands/µL     POCT alcohol breath test [[id number]]     Lab Status: No result             No orders to display       Procedures    ED Medication and Procedure Management   Prior to Admission Medications   Prescriptions Last Dose Informant Patient Reported? Taking?   Doxylamine Succinate, Sleep, (UNISOM PO)   Yes No   Sig: Take by mouth   Multiple Vitamins-Minerals (MULTIVITAMIN ADULT PO)  Self Yes No   Sig: Take 1 tablet by mouth daily   Tranexamic Acid 650 MG TABS  Self No No   Sig: Take 2 tablets (1,300 mg total) by mouth 3 (three) times a day During heavy days of menstrual cycle, maximum 5 days of use per month   bisoprolol (ZEBETA) 10 MG tablet  Self No No   Sig: Take 1 tablet (10 mg total) by mouth daily   fluticasone (FLONASE) 50 mcg/act nasal spray  Self Yes No   Si spray into each nostril daily   hydrOXYzine pamoate (VISTARIL) 50 mg capsule  Self Yes No   Sig: TAKE 1 CAPSULE BY MOUTH EVERY DAY AS NEEDED NO MORE THAN 6 CAPSULES IN 24 HOURS   levothyroxine 50 mcg tablet   No No   Sig: TAKE 1 TABLET BY MOUTH EVERY DAY   pyridoxine (VITAMIN B6) 100 mg tablet   Yes No   Sig: Take 100 mg by mouth daily   traZODone (DESYREL) 100 mg tablet   No No   Sig: TAKE 1 TABLET BY MOUTH EVERYDAY AT BEDTIME   venlafaxine (EFFEXOR-XR) 150 mg 24 hr capsule   No No   Sig: TAKE 1 CAPSULE BY MOUTH EVERY DAY      Facility-Administered Medications: None     Patient's Medications   Discharge Prescriptions    No medications on file     No discharge procedures on file.  ED SEPSIS DOCUMENTATION   Time reflects when diagnosis was documented in both MDM as  applicable and the Disposition within this note       Time User Action Codes Description Comment    2025  9:59 AM Shailesh Henry Add [F10.10] Alcohol abuse     2025  9:59 AM Shailesh Henry [F32.A] Depression                      [1]   Past Medical History:  Diagnosis Date    Alcohol withdrawal delirium, acute, hyperactive (HCC) 2022    Alcoholism (HCC)     Allergic     Anemia 2024    Anxiety     Depression     Disease of thyroid gland     ETOH abuse     Hypertension     Hyperthyroidism     Insulin controlled gestational diabetes mellitus (GDM) during pregnancy, antepartum 2019    Metformin dinner/HS insulin  Last Assessment & Plan:  BG log reviewed today:- previous review on Friday with recommendation for increasing HS insulin  Fastings: high normal 1 hour PP: within the desired range   Discussed rationale and recommendation to change current medical therapy as listed:   Bedtime: 18 units Levemir  Continue same dose of Metformin, 1000 mg at dinner.  Denies signs and sympto    Miscarriage     Obesity     Otitis media     Panic disorder    [2]   Past Surgical History:  Procedure Laterality Date     SECTION  2018     SECTION  10/20/2017    DILATION AND CURETTAGE OF UTERUS      IR LUMBAR PUNCTURE  6/3/2022    MASTOIDECTOMY  2013    MASTOIDECTOMY Right     GA TX MISSED  FIRST TRIMESTER SURGICAL N/A 2024    Procedure: DILATATION AND EVACUATION (D&E) 7 WEEKS, EXAM UNDER ANESTHESIA;  Surgeon: Clare Davidson MD;  Location:  MAIN OR;  Service: Gynecology    WISDOM TOOTH EXTRACTION     [3]   Family History  Problem Relation Name Age of Onset    Depression Mother Malika noel     Multiple sclerosis Mother Malika noel     Thyroid disease Mother Malika noel     Diabetes Mother Malika noel             Hypertension Mother Malika noel     COPD Mother Malika noel     Colon cancer Father Ashwin sadie     Rectal cancer Father Ashwin noel 54     Hyperlipidemia Father Ashwin noel     Hypertension Father Ashwin noel     Alcohol abuse Father Ashwin noel     Cancer Father Ashwin noel     Asthma Sister Divya     Hypertension Sister Stacey     No Known Problems Maternal Grandmother      Alcohol abuse Maternal Grandfather      Alcohol abuse Paternal Grandmother      No Known Problems Daughter Yamilet     Asthma Son Pawan     Esophageal cancer Maternal Aunt      Mental illness Paternal Uncle     [4]   Social History  Tobacco Use    Smoking status: Former     Current packs/day: 0.00     Average packs/day: 0.5 packs/day for 10.0 years (5.0 ttl pk-yrs)     Types: Cigarettes     Quit date: 2017     Years since quittin.5     Passive exposure: Past    Smokeless tobacco: Never   Vaping Use    Vaping status: Some Days    Substances: THC   Substance Use Topics    Alcohol use: Yes     Comment: Quit 2022    Drug use: Yes     Types: Marijuana     Comment: medical        Shailesh Henry DO  25 1327

## 2025-07-13 NOTE — ED NOTES
Patient continuously ripping off BP cuff and cardiac monitor. Provider made aware     Jocelyn Cuevas RN  07/13/25 3910

## 2025-07-14 NOTE — ED NOTES
"Pt is a 40 y.o. female who was brought to the ED with   Chief Complaint   Patient presents with    Mental Health Problem   Patient brought to the ED with complaints of ETOH Intaoxication, while patient was the ED she reported S/I (while she was still intoxicated) patient is now sober and now denies S/I , patient reports that she does not remember saying that she was sucidal or even how or why she came to the ED, patient declinded to speak with BCARES earlier today. Patient continues to deny S/I,H/IA/H,V/H . Patient reports that she struggling with her addiction and her addicaiton medciation (lamictal) patient states \"I hard to stay on it get and keep track of it, thinking about makes me feel bad\" I just want to go home\" Yes I drank way too much (vodka) Intake Assessment completed, Safety risk Assessment completed.[facility] met with patient. Patient continues to deny S/I,H/I,A/H,V/H. Patient reports she will talk with her therapist about this. [facility] discussed this case and patient plane with ED Physician. Patient will be discharge per ED Physician.  Patient refuses any and all referrals from [facility]         Discharge and Safety Plan     This writer discussed the patients current presentation and recommended discharge plan with  They agree with the patient being discharged at this time with referrals and/or information about ETOH intoxication      The patient was Instructed to follow up with their Therapist and Addiction Medicine Dr.   Patient refused any and all referrals         The patient declined to complete a safety plan however a blank plan was provided for future use.  In addition, the patient was instructed to call local [facility] crisis, other crisis services, 911 or to go to the nearest ER immediately if their situation changes at any time.            This writer discussed discharge plans with the patient, who agrees with and understands the discharge plans.            SAFETY PLAN  Warning Signs (thoughts, " images, mood, behavior, situations) of a potential crisis: drinking Alcohol          Coping Skills (what can I do to take my mind off the problem, or to keep myself safe):trying to stay sober         Outside Support (who can I reach out to for support and help): therapist            National Suicide Prevention Hotline:  988       [facility] [phone] - Crisis   [facility] [phone] - LVF Crisis/Mobile Crisis   [phone] - SLPF Crisis   [facility]: [phone]  [facility]: [phone]   [facility] [place] [phone] - Crisis   [facility] [phone] - Crisis      [facility] [phone] - Crisis   [facility] [phone] - Crisis   [phone] - Peer Support Talk Line (1-9pm daily)  [phone] - Teen Support Talk Line (1-9pm daily)  [phone] - [facility] [phone]- Crisis    [facility] [phone] - Crisis   [facility] [phone] - Crisis    [facility]) [phone] - [facility] Crisis             Jean Akins

## 2025-07-14 NOTE — DISCHARGE INSTRUCTIONS
Please take a list of all of your medications and discharge paperwork with you to all of your follow-up medical visits. Please take all of your medications as directed. Please call your family doctor or return to the ER if you have increased shortness of breath, chest pain, fevers, chills, nausea, vomiting, diarrhea, or any other worsening symptoms.  Discharge and Safety Plan    This writer discussed the patients current presentation and recommended discharge plan with  They agree with the patient being discharged at this time with referrals and/or information about ETOH intoxication     The patient was Instructed to follow up with their Therapist and Addiction Medicine Dr.   Patient refused any and all referrals       The patient declined to complete a safety plan however a blank plan was provided for future use.  In addition, the patient was instructed to call local [facility] crisis, other crisis services, 1 or to go to the nearest ER immediately if their situation changes at any time.         This writer discussed discharge plans with the patient, who agrees with and understands the discharge plans.         SAFETY PLAN  Warning Signs (thoughts, images, mood, behavior, situations) of a potential crisis: drinking Alcohol        Coping Skills (what can I do to take my mind off the problem, or to keep myself safe):trying to stay sober       Outside Support (who can I reach out to for support and help): therapist         National Suicide Prevention Hotline:  [address] [place] [address] [phone] - Crisis   [facility] [phone] - [facility] Crisis/Mobile Crisis   [phone] - S[facility] Crisis   [facility]: [phone]  [facility]: [phone]   [facility] [phone] - Crisis   [facility] [phone] - Crisis     [facility] [phone] - Crisis   [facility] [phone] - Crisis   [phone] - Peer Support Talk Line (1-9pm daily)  [phone] - Teen Support Talk Line (1-9pm  daily)  [phone] - [facility]   [facility] [phone]- Crisis    [facility] [phone] - Crisis   [facility] [phone] - Crisis    [facility]) [phone] - [facility] Crisis

## 2025-07-14 NOTE — ED CARE HANDOFF
[facility] Warm Handoff Outcome Note    Patient name Susan Barrett  Location ED 03/ED 03 MRN [id number]  Age: 40 y.o.          Plan Type:  Warm Handoff                                                                                    Plan Date: 7/14/2025  Service:  ED Warm Handoff      Substance Use History:  Alcohol    Warm Handoff Update:  Patient accepted OP resources    Warm Handoff Outcome: Treatment Related Resources

## 2025-07-14 NOTE — ED CARE HANDOFF
Emergency Department Sign Out Note        Sign out and transfer of care from previous provider. See Separate Emergency Department note.     The patient, Susan Barrett, was evaluated by the previous provider for alcohol taxation and suicidal ideation.    Workup Completed:  Medical clearance workup    ED Course / Workup Pending (followup):  Pending clinical sobriety and then crisis evaluation        No data recorded                          ED Course as of 07/13/25 2010   Sun Jul 13, 2025 1653 After patient is clinically sober patient needs to be assessed by crisis worker and then plan for disposition.  Patient was expressing some suicidal ideation but was intoxicated with alcohol.   1924 EXTBreath Alcohol: 0.09  Patient currently awaiting crisis evaluation for her suicide ideation statement that she made while she was intoxicated with alcohol.   2007 After patient was clinically sober, patient was evaluated by our crisis worker.  At this time patient does not even recall having suicidal ideation when she mentioned it under intoxication.  Patient currently is not suicidal or homicidal.  Patient does not want any help with her alcohol abuse.  Patient states that she is already on outpatient treatment.  At this time patient is requesting to go home.  Patient can be discharged home from crisis worker standpoint.  Patient discharged home with outpatient follow-up to her PCP and other resources she has in place in the community.     Procedures  Medical Decision Making  After patient was clinically sober, patient was evaluated by our crisis worker.  At this time patient does not even recall having suicidal ideation when she mentioned it under intoxication.  Patient currently is not suicidal or homicidal.  Patient does not want any help with her alcohol abuse.  Patient states that she is already on outpatient treatment.  At this time patient is requesting to go home.  Patient can be discharged home from crisis worker  standpoint.  Patient discharged home with outpatient follow-up to her PCP and other resources she has in place in the community.  Close return instructions given to return to the ER for any worsening symptoms.  Patient agrees with discharge plan.  Patient well appearing at time of discharge.    Please Note: Fluency Direct voice recognition software may have been used in the creation of this document. Wrong words or sound a like substitutions may have occurred due to the inherent limitations of the voice software.         Amount and/or Complexity of Data Reviewed  Labs: ordered. Decision-making details documented in ED Course.    Risk  OTC drugs.  Prescription drug management.            Disposition  Final diagnoses:   Alcohol intoxication (HCC)   Suicide ideation   Chronic alcohol abuse   Depression     Time reflects when diagnosis was documented in both MDM as applicable and the Disposition within this note       Time User Action Codes Description Comment    7/13/2025  9:59 AM Shailesh Henry Add [F10.10] Alcohol abuse     7/13/2025  9:59 AM Shailesh Henry Add [F32.A] Depression     7/13/2025  7:24 PM Ferdous, Komaira Modify [F32.A] Depression     7/13/2025  7:24 PM Ferdous, Komaira Remove [F10.10] Alcohol abuse     7/13/2025  7:24 PM Ferdous, Komaira Remove [F32.A] Depression     7/13/2025  7:24 PM Ferdous, Komaira Add [F10.929] Alcohol intoxication (HCC)     7/13/2025  7:24 PM Ferdous, Komaira Add [R45.851] Suicide ideation     7/13/2025  7:24 PM Ferdous, Komaira Add [F10.10] Chronic alcohol abuse     7/13/2025  7:24 PM Ferdous, Komaira Add [F32.A] Depression           ED Disposition       ED Disposition   Discharge    Condition   Stable    Date/Time   Sun Jul 13, 2025  8:09 PM    Comment   Susan Barrett should be transferred out to [facility] and has been medically cleared.               Follow-up Information       Follow up With Specialties Details Why Contact Info    Angelina Woo,  Internal Medicine, Family  Medicine   [address] [place], [place] [id number]  [phone]            Patient's Medications   Discharge Prescriptions    No medications on file     No discharge procedures on file.       ED Provider  Electronically Signed by     Kishore Mendieta DO  07/13/25 2010

## (undated) DEVICE — D + E SAFE TOUCH TISSUE TRAP (CIRCON)

## (undated) DEVICE — CURETTE VACURETTE CRVD 8MM

## (undated) DEVICE — GLOVE SRG LF STRL BGL SKNSNS 7.5 PF

## (undated) DEVICE — COLLECTION SET, DISPOSABLE WITH HANDLE AND TAPERED FITTINGS TUBING, 6 FT (183 CM): Brand: GYRUS ACMI

## (undated) DEVICE — 1820 FOAM BLOCK NEEDLE COUNTER: Brand: DEVON

## (undated) DEVICE — CHLORHEXIDINE 4PCT 4 OZ

## (undated) DEVICE — PREMIUM DRY TRAY LF: Brand: MEDLINE INDUSTRIES, INC.

## (undated) DEVICE — LIGHT GLOVE GREEN

## (undated) DEVICE — SYRINGE 10ML LL CONTROL TOP

## (undated) DEVICE — GLOVE INDICATOR PI UNDERGLOVE SZ 7.5 BLUE

## (undated) DEVICE — NEEDLE SPINAL 22G X 3.5IN  QUINCKE

## (undated) DEVICE — ARTHROSCOPY FLOOR MAT

## (undated) DEVICE — STRL ALLENTOWN HYSTEROSCOPY PK: Brand: CARDINAL HEALTH